# Patient Record
Sex: FEMALE | Race: BLACK OR AFRICAN AMERICAN | Employment: FULL TIME | URBAN - METROPOLITAN AREA
[De-identification: names, ages, dates, MRNs, and addresses within clinical notes are randomized per-mention and may not be internally consistent; named-entity substitution may affect disease eponyms.]

---

## 2017-03-27 ENCOUNTER — OFFICE VISIT (OUTPATIENT)
Dept: INTERNAL MEDICINE | Age: 37
End: 2017-03-27
Payer: MEDICAID

## 2017-03-27 VITALS
HEART RATE: 86 BPM | DIASTOLIC BLOOD PRESSURE: 85 MMHG | WEIGHT: 238 LBS | HEIGHT: 64 IN | BODY MASS INDEX: 40.63 KG/M2 | SYSTOLIC BLOOD PRESSURE: 124 MMHG

## 2017-03-27 DIAGNOSIS — J45.20 MILD INTERMITTENT ASTHMA WITHOUT COMPLICATION: ICD-10-CM

## 2017-03-27 DIAGNOSIS — E66.01 MORBID OBESITY DUE TO EXCESS CALORIES (HCC): ICD-10-CM

## 2017-03-27 DIAGNOSIS — R73.09 IMPAIRED GLUCOSE REGULATION: ICD-10-CM

## 2017-03-27 DIAGNOSIS — R21 RASH: ICD-10-CM

## 2017-03-27 LAB — HBA1C MFR BLD: 5.8 %

## 2017-03-27 PROCEDURE — 99203 OFFICE O/P NEW LOW 30 MIN: CPT | Performed by: INTERNAL MEDICINE

## 2017-03-27 PROCEDURE — 83036 HEMOGLOBIN GLYCOSYLATED A1C: CPT | Performed by: INTERNAL MEDICINE

## 2017-03-27 RX ORDER — AZITHROMYCIN 250 MG/1
TABLET, FILM COATED ORAL
COMMUNITY
Start: 2016-12-08 | End: 2017-03-27 | Stop reason: ALTCHOICE

## 2017-03-27 RX ORDER — PNV,CALCIUM 72/IRON/FOLIC ACID 27 MG-1 MG
TABLET ORAL
Refills: 12 | COMMUNITY
Start: 2017-02-28 | End: 2017-05-19 | Stop reason: SDUPTHER

## 2017-03-27 RX ORDER — FLUCONAZOLE 150 MG/1
TABLET ORAL
Refills: 2 | COMMUNITY
Start: 2017-02-28 | End: 2017-07-11 | Stop reason: ALTCHOICE

## 2017-03-27 RX ORDER — BENZONATATE 100 MG/1
100 CAPSULE ORAL
COMMUNITY
Start: 2016-12-08 | End: 2017-03-27 | Stop reason: ALTCHOICE

## 2017-03-27 RX ORDER — PREDNISONE 20 MG/1
20 TABLET ORAL
COMMUNITY
Start: 2016-12-08 | End: 2017-03-27 | Stop reason: ALTCHOICE

## 2017-03-27 RX ORDER — ALBUTEROL SULFATE 90 UG/1
2 AEROSOL, METERED RESPIRATORY (INHALATION) EVERY 6 HOURS PRN
Qty: 1 INHALER | Refills: 3 | Status: SHIPPED | OUTPATIENT
Start: 2017-03-27

## 2017-03-27 RX ORDER — HYDROCORTISONE VALERATE 2 MG/G
OINTMENT TOPICAL
Qty: 2 TUBE | Refills: 0 | Status: SHIPPED | OUTPATIENT
Start: 2017-03-27 | End: 2017-05-19

## 2017-03-27 RX ORDER — HYDROXYZINE HYDROCHLORIDE 25 MG/1
25 TABLET, FILM COATED ORAL 3 TIMES DAILY PRN
Qty: 30 TABLET | Refills: 2 | Status: SHIPPED | OUTPATIENT
Start: 2017-03-27 | End: 2017-04-06

## 2017-04-06 ENCOUNTER — HOSPITAL ENCOUNTER (EMERGENCY)
Age: 37
Discharge: HOME | End: 2017-04-06
Attending: EMERGENCY MEDICINE
Payer: COMMERCIAL

## 2017-04-06 ENCOUNTER — APPOINTMENT (OUTPATIENT)
Dept: GENERAL RADIOLOGY | Age: 37
End: 2017-04-06
Payer: COMMERCIAL

## 2017-04-06 VITALS
TEMPERATURE: 98 F | WEIGHT: 240.7 LBS | DIASTOLIC BLOOD PRESSURE: 60 MMHG | OXYGEN SATURATION: 99 % | HEART RATE: 81 BPM | BODY MASS INDEX: 41.32 KG/M2 | SYSTOLIC BLOOD PRESSURE: 124 MMHG | RESPIRATION RATE: 16 BRPM

## 2017-04-06 DIAGNOSIS — S43.401A SPRAIN OF RIGHT SHOULDER, UNSPECIFIED SHOULDER SPRAIN TYPE, INITIAL ENCOUNTER: Primary | ICD-10-CM

## 2017-04-06 PROCEDURE — 99283 EMERGENCY DEPT VISIT LOW MDM: CPT

## 2017-04-06 PROCEDURE — 6360000002 HC RX W HCPCS: Performed by: NURSE PRACTITIONER

## 2017-04-06 PROCEDURE — 73030 X-RAY EXAM OF SHOULDER: CPT

## 2017-04-06 PROCEDURE — 96372 THER/PROPH/DIAG INJ SC/IM: CPT

## 2017-04-06 RX ORDER — IBUPROFEN 800 MG/1
800 TABLET ORAL EVERY 8 HOURS PRN
Qty: 30 TABLET | Refills: 0 | Status: SHIPPED | OUTPATIENT
Start: 2017-04-06 | End: 2017-07-11 | Stop reason: ALTCHOICE

## 2017-04-06 RX ORDER — ACETAMINOPHEN AND CODEINE PHOSPHATE 300; 30 MG/1; MG/1
1 TABLET ORAL 3 TIMES DAILY PRN
Qty: 10 TABLET | Refills: 0 | Status: SHIPPED | OUTPATIENT
Start: 2017-04-06 | End: 2017-07-11 | Stop reason: ALTCHOICE

## 2017-04-06 RX ORDER — KETOROLAC TROMETHAMINE 30 MG/ML
60 INJECTION, SOLUTION INTRAMUSCULAR; INTRAVENOUS ONCE
Status: COMPLETED | OUTPATIENT
Start: 2017-04-06 | End: 2017-04-06

## 2017-04-06 RX ADMIN — KETOROLAC TROMETHAMINE 60 MG: 30 INJECTION, SOLUTION INTRAMUSCULAR at 10:59

## 2017-04-06 ASSESSMENT — ENCOUNTER SYMPTOMS
NAUSEA: 0
COUGH: 0
SHORTNESS OF BREATH: 0
SINUS PRESSURE: 0
WHEEZING: 0
CONSTIPATION: 0
ABDOMINAL PAIN: 0
SORE THROAT: 0
VOMITING: 0
COLOR CHANGE: 0
RHINORRHEA: 0
DIARRHEA: 0

## 2017-04-06 ASSESSMENT — PAIN SCALES - GENERAL
PAINLEVEL_OUTOF10: 10
PAINLEVEL_OUTOF10: 10

## 2017-04-17 ENCOUNTER — HOSPITAL ENCOUNTER (OUTPATIENT)
Dept: MRI IMAGING | Facility: CLINIC | Age: 37
Discharge: HOME | End: 2017-04-17
Payer: COMMERCIAL

## 2017-04-17 DIAGNOSIS — S43.401A SPRAIN OF RIGHT SHOULDER, UNSPECIFIED SHOULDER SPRAIN TYPE, INITIAL ENCOUNTER: ICD-10-CM

## 2017-04-17 PROCEDURE — 73221 MRI JOINT UPR EXTREM W/O DYE: CPT

## 2017-05-19 ENCOUNTER — OFFICE VISIT (OUTPATIENT)
Dept: INTERNAL MEDICINE | Age: 37
End: 2017-05-19
Payer: MEDICAID

## 2017-05-19 VITALS
WEIGHT: 245 LBS | BODY MASS INDEX: 41.83 KG/M2 | DIASTOLIC BLOOD PRESSURE: 84 MMHG | SYSTOLIC BLOOD PRESSURE: 124 MMHG | HEART RATE: 87 BPM | HEIGHT: 64 IN

## 2017-05-19 DIAGNOSIS — R73.03 PREDIABETES: ICD-10-CM

## 2017-05-19 DIAGNOSIS — R73.09 IMPAIRED GLUCOSE REGULATION: ICD-10-CM

## 2017-05-19 DIAGNOSIS — E66.01 MORBID OBESITY DUE TO EXCESS CALORIES (HCC): ICD-10-CM

## 2017-05-19 DIAGNOSIS — R21 RASH: Primary | ICD-10-CM

## 2017-05-19 PROCEDURE — 99213 OFFICE O/P EST LOW 20 MIN: CPT | Performed by: STUDENT IN AN ORGANIZED HEALTH CARE EDUCATION/TRAINING PROGRAM

## 2017-05-19 RX ORDER — DIAPER,BRIEF,INFANT-TODD,DISP
EACH MISCELLANEOUS
Qty: 1 TUBE | Refills: 1 | Status: CANCELLED | OUTPATIENT
Start: 2017-05-19 | End: 2017-05-26

## 2017-05-19 RX ORDER — PERMETHRIN 50 MG/G
CREAM TOPICAL
Refills: 1 | COMMUNITY
Start: 2017-05-16 | End: 2017-07-11 | Stop reason: CLARIF

## 2017-05-19 RX ORDER — METHYLPREDNISOLONE 4 MG/1
TABLET ORAL
Qty: 1 KIT | Refills: 0 | Status: SHIPPED | OUTPATIENT
Start: 2017-05-19 | End: 2017-05-25

## 2017-05-19 ASSESSMENT — PATIENT HEALTH QUESTIONNAIRE - PHQ9
3. TROUBLE FALLING OR STAYING ASLEEP: 1
6. FEELING BAD ABOUT YOURSELF - OR THAT YOU ARE A FAILURE OR HAVE LET YOURSELF OR YOUR FAMILY DOWN: 0
5. POOR APPETITE OR OVEREATING: 1
8. MOVING OR SPEAKING SO SLOWLY THAT OTHER PEOPLE COULD HAVE NOTICED. OR THE OPPOSITE, BEING SO FIGETY OR RESTLESS THAT YOU HAVE BEEN MOVING AROUND A LOT MORE THAN USUAL: 0
7. TROUBLE CONCENTRATING ON THINGS, SUCH AS READING THE NEWSPAPER OR WATCHING TELEVISION: 0
1. LITTLE INTEREST OR PLEASURE IN DOING THINGS: 1
2. FEELING DOWN, DEPRESSED OR HOPELESS: 0
9. THOUGHTS THAT YOU WOULD BE BETTER OFF DEAD, OR OF HURTING YOURSELF: 0
10. IF YOU CHECKED OFF ANY PROBLEMS, HOW DIFFICULT HAVE THESE PROBLEMS MADE IT FOR YOU TO DO YOUR WORK, TAKE CARE OF THINGS AT HOME, OR GET ALONG WITH OTHER PEOPLE: 0
SUM OF ALL RESPONSES TO PHQ QUESTIONS 1-9: 4
SUM OF ALL RESPONSES TO PHQ9 QUESTIONS 1 & 2: 1
4. FEELING TIRED OR HAVING LITTLE ENERGY: 1

## 2017-07-01 ENCOUNTER — HOSPITAL ENCOUNTER (OUTPATIENT)
Age: 37
Discharge: HOME | End: 2017-07-01
Payer: MEDICAID

## 2017-07-01 LAB
ALBUMIN SERPL-MCNC: 3.8 G/DL (ref 3.5–5.2)
ALBUMIN/GLOBULIN RATIO: 1.2 (ref 1–2.5)
ALP BLD-CCNC: 62 U/L (ref 35–104)
ALT SERPL-CCNC: 11 U/L (ref 5–33)
ANION GAP SERPL CALCULATED.3IONS-SCNC: 14 MMOL/L (ref 9–17)
AST SERPL-CCNC: 16 U/L
BILIRUB SERPL-MCNC: 0.17 MG/DL (ref 0.3–1.2)
BUN BLDV-MCNC: 10 MG/DL (ref 6–20)
BUN/CREAT BLD: ABNORMAL (ref 9–20)
CALCIUM SERPL-MCNC: 8.7 MG/DL (ref 8.6–10.4)
CHLORIDE BLD-SCNC: 103 MMOL/L (ref 98–107)
CHOLESTEROL/HDL RATIO: 4.1
CHOLESTEROL: 189 MG/DL
CO2: 23 MMOL/L (ref 20–31)
CREAT SERPL-MCNC: 0.72 MG/DL (ref 0.5–0.9)
GFR AFRICAN AMERICAN: >60 ML/MIN
GFR NON-AFRICAN AMERICAN: >60 ML/MIN
GFR SERPL CREATININE-BSD FRML MDRD: ABNORMAL ML/MIN/{1.73_M2}
GFR SERPL CREATININE-BSD FRML MDRD: ABNORMAL ML/MIN/{1.73_M2}
GLUCOSE BLD-MCNC: 95 MG/DL (ref 70–99)
HDLC SERPL-MCNC: 46 MG/DL
LDL CHOLESTEROL: 123 MG/DL (ref 0–130)
POTASSIUM SERPL-SCNC: 3.8 MMOL/L (ref 3.7–5.3)
SODIUM BLD-SCNC: 140 MMOL/L (ref 135–144)
TOTAL PROTEIN: 7.1 G/DL (ref 6.4–8.3)
TRIGL SERPL-MCNC: 102 MG/DL
TSH SERPL DL<=0.05 MIU/L-ACNC: 1.48 MIU/L (ref 0.3–5)
VLDLC SERPL CALC-MCNC: NORMAL MG/DL (ref 1–30)

## 2017-07-01 PROCEDURE — 84443 ASSAY THYROID STIM HORMONE: CPT

## 2017-07-01 PROCEDURE — 36415 COLL VENOUS BLD VENIPUNCTURE: CPT

## 2017-07-01 PROCEDURE — 80053 COMPREHEN METABOLIC PANEL: CPT

## 2017-07-01 PROCEDURE — 80061 LIPID PANEL: CPT

## 2017-07-11 ENCOUNTER — OFFICE VISIT (OUTPATIENT)
Dept: INTERNAL MEDICINE | Age: 37
End: 2017-07-11
Payer: MEDICAID

## 2017-07-11 VITALS
WEIGHT: 243 LBS | HEIGHT: 64 IN | DIASTOLIC BLOOD PRESSURE: 79 MMHG | BODY MASS INDEX: 41.48 KG/M2 | TEMPERATURE: 99 F | SYSTOLIC BLOOD PRESSURE: 120 MMHG | HEART RATE: 82 BPM

## 2017-07-11 DIAGNOSIS — G89.29 CHRONIC MIDLINE LOW BACK PAIN WITHOUT SCIATICA: ICD-10-CM

## 2017-07-11 DIAGNOSIS — R73.03 PRE-DIABETES: ICD-10-CM

## 2017-07-11 DIAGNOSIS — L30.9 ECZEMA OF BOTH UPPER EXTREMITIES: ICD-10-CM

## 2017-07-11 DIAGNOSIS — Z02.1 PRE-EMPLOYMENT EXAMINATION: ICD-10-CM

## 2017-07-11 DIAGNOSIS — E66.01 MORBID OBESITY DUE TO EXCESS CALORIES (HCC): Primary | ICD-10-CM

## 2017-07-11 DIAGNOSIS — M54.50 CHRONIC MIDLINE LOW BACK PAIN WITHOUT SCIATICA: ICD-10-CM

## 2017-07-11 PROCEDURE — 99213 OFFICE O/P EST LOW 20 MIN: CPT | Performed by: HOSPITALIST

## 2017-07-11 RX ORDER — NAPROXEN 500 MG/1
500 TABLET ORAL 2 TIMES DAILY PRN
Qty: 40 TABLET | Refills: 3 | Status: SHIPPED | OUTPATIENT
Start: 2017-07-11 | End: 2019-07-17 | Stop reason: ALTCHOICE

## 2017-07-11 RX ORDER — FENOPROFEN CALCIUM 200 MG
CAPSULE ORAL
Qty: 1 BOTTLE | Refills: 1 | Status: SHIPPED | OUTPATIENT
Start: 2017-07-11 | End: 2020-02-25

## 2017-08-09 ENCOUNTER — HOSPITAL ENCOUNTER (OUTPATIENT)
Age: 37
Setting detail: SPECIMEN
Discharge: HOME | End: 2017-08-09
Payer: MEDICAID

## 2017-08-09 ENCOUNTER — OFFICE VISIT (OUTPATIENT)
Dept: OBGYN | Age: 37
End: 2017-08-09
Payer: MEDICAID

## 2017-08-09 VITALS
HEART RATE: 85 BPM | SYSTOLIC BLOOD PRESSURE: 123 MMHG | WEIGHT: 241.5 LBS | BODY MASS INDEX: 41.23 KG/M2 | HEIGHT: 64 IN | DIASTOLIC BLOOD PRESSURE: 78 MMHG

## 2017-08-09 DIAGNOSIS — N93.9 ABNORMAL UTERINE BLEEDING (AUB): Primary | ICD-10-CM

## 2017-08-09 DIAGNOSIS — N39.41 URGE INCONTINENCE OF URINE: ICD-10-CM

## 2017-08-09 PROCEDURE — 99213 OFFICE O/P EST LOW 20 MIN: CPT | Performed by: OBSTETRICS & GYNECOLOGY

## 2017-08-10 LAB
CULTURE: NORMAL
CULTURE: NORMAL
Lab: NORMAL
SPECIMEN DESCRIPTION: NORMAL
STATUS: NORMAL

## 2017-09-01 ENCOUNTER — HOSPITAL ENCOUNTER (OUTPATIENT)
Age: 37
Setting detail: SPECIMEN
Discharge: HOME | End: 2017-09-01
Payer: MEDICAID

## 2017-09-01 ENCOUNTER — PROCEDURE VISIT (OUTPATIENT)
Dept: OBGYN | Age: 37
End: 2017-09-01
Payer: MEDICAID

## 2017-09-01 VITALS
BODY MASS INDEX: 41.2 KG/M2 | HEART RATE: 89 BPM | DIASTOLIC BLOOD PRESSURE: 88 MMHG | WEIGHT: 240 LBS | SYSTOLIC BLOOD PRESSURE: 134 MMHG

## 2017-09-01 DIAGNOSIS — N92.0 MENORRHAGIA WITH REGULAR CYCLE: ICD-10-CM

## 2017-09-01 DIAGNOSIS — E88.81 METABOLIC SYNDROME: ICD-10-CM

## 2017-09-01 DIAGNOSIS — N93.9 ABNORMAL UTERINE BLEEDING (AUB): ICD-10-CM

## 2017-09-01 DIAGNOSIS — N94.6 DYSMENORRHEA: ICD-10-CM

## 2017-09-01 DIAGNOSIS — N93.9 ABNORMAL UTERINE BLEEDING (AUB): Primary | ICD-10-CM

## 2017-09-01 PROBLEM — E88.810 METABOLIC SYNDROME: Status: ACTIVE | Noted: 2017-09-01

## 2017-09-01 LAB
ABSOLUTE EOS #: 0.1 K/UL (ref 0–0.4)
ABSOLUTE LYMPH #: 2.7 K/UL (ref 1–4.8)
ABSOLUTE MONO #: 0.6 K/UL (ref 0.1–1.2)
BASOPHILS # BLD: 1 %
BASOPHILS ABSOLUTE: 0 K/UL (ref 0–0.2)
CONTROL: NORMAL
DIFFERENTIAL TYPE: ABNORMAL
EOSINOPHILS RELATIVE PERCENT: 1 %
HCG QUANTITATIVE: <1 IU/L
HCT VFR BLD CALC: 30.2 % (ref 36–46)
HEMOGLOBIN: 10 G/DL (ref 12–16)
INR BLD: 0.9
LYMPHOCYTES # BLD: 33 %
MCH RBC QN AUTO: 29 PG (ref 26–34)
MCHC RBC AUTO-ENTMCNC: 33 G/DL (ref 31–37)
MCV RBC AUTO: 87.9 FL (ref 80–100)
MONOCYTES # BLD: 8 %
PARTIAL THROMBOPLASTIN TIME: 23.7 SEC (ref 21.3–31.3)
PDW BLD-RTO: 15.8 % (ref 12.5–15.4)
PLATELET # BLD: 417 K/UL (ref 140–450)
PLATELET ESTIMATE: ABNORMAL
PMV BLD AUTO: 7.3 FL (ref 6–12)
PREGNANCY TEST URINE, POC: NEGATIVE
PROTHROMBIN TIME: 10.1 SEC (ref 9.4–12.6)
RBC # BLD: 3.43 M/UL (ref 4–5.2)
RBC # BLD: ABNORMAL 10*6/UL
SEG NEUTROPHILS: 57 %
SEGMENTED NEUTROPHILS ABSOLUTE COUNT: 4.7 K/UL (ref 1.8–7.7)
WBC # BLD: 8.2 K/UL (ref 3.5–11)
WBC # BLD: ABNORMAL 10*3/UL

## 2017-09-01 PROCEDURE — 99213 OFFICE O/P EST LOW 20 MIN: CPT | Performed by: OBSTETRICS & GYNECOLOGY

## 2017-09-01 PROCEDURE — 85610 PROTHROMBIN TIME: CPT

## 2017-09-01 PROCEDURE — 85730 THROMBOPLASTIN TIME PARTIAL: CPT

## 2017-09-01 PROCEDURE — 85025 COMPLETE CBC W/AUTO DIFF WBC: CPT

## 2017-09-01 PROCEDURE — 81025 URINE PREGNANCY TEST: CPT | Performed by: OBSTETRICS & GYNECOLOGY

## 2017-09-01 PROCEDURE — 84702 CHORIONIC GONADOTROPIN TEST: CPT

## 2017-09-01 PROCEDURE — 36415 COLL VENOUS BLD VENIPUNCTURE: CPT

## 2017-10-16 ENCOUNTER — HOSPITAL ENCOUNTER (OUTPATIENT)
Age: 37
Setting detail: SPECIMEN
Discharge: HOME | End: 2017-10-16
Payer: MEDICAID

## 2017-10-16 ENCOUNTER — PROCEDURE VISIT (OUTPATIENT)
Dept: OBGYN | Age: 37
End: 2017-10-16
Payer: MEDICAID

## 2017-10-16 VITALS
DIASTOLIC BLOOD PRESSURE: 81 MMHG | WEIGHT: 234 LBS | HEART RATE: 81 BPM | HEIGHT: 64 IN | SYSTOLIC BLOOD PRESSURE: 116 MMHG | BODY MASS INDEX: 39.95 KG/M2

## 2017-10-16 DIAGNOSIS — N93.9 ABNORMAL UTERINE BLEEDING (AUB): Primary | ICD-10-CM

## 2017-10-16 DIAGNOSIS — N94.6 DYSMENORRHEA: ICD-10-CM

## 2017-10-16 LAB
CONTROL: NORMAL
PREGNANCY TEST URINE, POC: NEGATIVE

## 2017-10-16 PROCEDURE — 58100 BIOPSY OF UTERUS LINING: CPT | Performed by: OBSTETRICS & GYNECOLOGY

## 2017-10-16 PROCEDURE — 81025 URINE PREGNANCY TEST: CPT | Performed by: OBSTETRICS & GYNECOLOGY

## 2017-10-16 NOTE — PROGRESS NOTES
Date: 10/16/2017  Time: 5:14 PM      Patient Name: Carina Hernández  Patient : 1980  Room/Bed: Room/bed info not found  Admission Date/Time: No admission date for patient encounter. Attending Physician Statement  I have discussed the care of Carina Hernández, including pertinent history and exam findings with the resident. I have reviewed and edited their note in the electronic medical record. The key elements of all parts of the encounter have been performed/reviewed by me . I agree with the assessment, plan and orders as documented by the resident. The level of care submitted represents to the best of my ability the care documented in the medical record today. GC Modifier. This service has been performed in part by a resident under the direction of a teaching physician. Patient was seen today for F/U after TVUS. EMB was also  Performed. .    Attending's Name:  Bagn Marcos MD

## 2017-10-16 NOTE — PROGRESS NOTES
10/16/2017    Don Vega is a 40 y.o. female,       No LMP recorded. Chief Complaint   Patient presents with    Procedure     EMB w Hscope       Urine pregnancy test: negative     Past Medical History:   Diagnosis Date    BMI 40.0-44.9, adult ([address] 2016         Past Surgical History:   Procedure Laterality Date     SECTION         Social History   Substance Use Topics    Smoking status: Never Smoker    Smokeless tobacco: Never Used    Alcohol use Yes      Comment: social         Current Outpatient Prescriptions   Medication Sig Dispense Refill    metFORMIN (GLUCOPHAGE) 500 MG tablet Take 1 tablet by mouth 2 times daily (with meals) 60 tablet 5    hydrocortisone 1 % lotion Apply topically 2 times daily. 1 Bottle 1    naproxen (NAPROSYN) 500 MG tablet Take 1 tablet by mouth 2 times daily as needed for Pain (for back pain) 40 tablet 3    albuterol sulfate  (90 BASE) MCG/ACT inhaler Inhale 2 puffs into the lungs every 6 hours as needed for Wheezing 1 Inhaler 3    Prenatal Multivit-Min-Fe-FA (PRENATAL FORTE) TABS Take 1 tablet by mouth Daily 30 tablet 12     No current facility-administered medications for this visit. Allergies as of 10/16/2017    (No Known Allergies)         Diagnostics:  EXAMINATION:   PELVIC ULTRASOUND       2017       TECHNIQUE:   Transvaginal pelvic ultrasound was performed.  No color Doppler evaluation   was performed.       COMPARISON:   2011       HISTORY:   ORDERING SYSTEM PROVIDED HISTORY: Abnormal uterine bleeding (AUB)   TECHNOLOGIST PROVIDED HISTORY:   Begin with transabdominal imaging.    Reason for exam:->abnormal bleeding       FINDINGS:   Measurements:       Uterus:  10.2 x 7.6 x 9.3 cm       Endometrial stripe:  9 mm       Right Ovary:  Was not able to be visualized       Left Ovary:  Was not able to be visualized       Ultrasound Findings:       Uterus: Uterine myometrium is somewhat heterogeneous. Tarry Kellie is a hypoechoic mass in the left low uterine body measuring approximately 4.4 x 3.1 x 4.4 cm   compatible with a fibroid.  A hypoechoic mass in the right posterior uterine   body measures 3.2 x 3.5 x 3.6 cm and is compatible with a fibroid.  Tiny   nabothian cyst in the cervix.       Endometrial stripe: Endometrial stripe is within normal limits.       Ovaries:  Neither ovary could be visualized due to overlying bowel gas.  No   definite adnexal masses are seen within limits of the exam.       Free Fluid: No evidence of free fluid.           Impression   Uterine fibroids.               Blood pressure 116/81, pulse 81, height 5' 4\" (1.626 m), weight 234 lb (106.1 kg). The patient was counseled on the procedure. Risks, benefits and alternatives were reviewed. The patient is aware that this is diagnostic and not curative and a second procedure may be needed. A consent was reviewed and obtained. The patient was positioned comfortably on the exam table. After a bi-manual exam; the uterus was found to be  anteverted with a size of 7 cm. There was no adnexal masses and the bladder was smooth, non-tender and without palpable masses. A sterile speculum was placed into the vagina and the cervix was identified. It was stabilized with an allis clamp. It was cleansed with betadine and the aspirator was then gently passed into the endometrial cavity. Tissue was obtained and sent to pathology. The patient tolerated the procedure well. Post procedure restrictions were reviewed and given to the patient. .  All counts and instruments were correct at the end of the procedure. Assessment:  1. Abnormal uterine bleeding (AUB)  POCT urine pregnancy   2.  Dysmenorrhea  POCT urine pregnancy     Patient Active Problem List    Diagnosis Date Noted    Metabolic syndrome 63/92/1833     Priority: Medium     Metformin 500mg BID      Dysmenorrhea 09/01/2017     Priority: Medium    Abnormal uterine bleeding (AUB) 08/09/2017     Priority: Medium

## 2017-10-19 LAB — SURGICAL PATHOLOGY REPORT: NORMAL

## 2017-11-15 ENCOUNTER — OFFICE VISIT (OUTPATIENT)
Dept: OBGYN | Age: 37
End: 2017-11-15

## 2017-11-15 VITALS
SYSTOLIC BLOOD PRESSURE: 117 MMHG | HEIGHT: 64 IN | HEART RATE: 91 BPM | DIASTOLIC BLOOD PRESSURE: 83 MMHG | BODY MASS INDEX: 40.29 KG/M2 | WEIGHT: 236 LBS

## 2017-11-15 DIAGNOSIS — N94.6 DYSMENORRHEA: ICD-10-CM

## 2017-11-15 DIAGNOSIS — N93.9 ABNORMAL UTERINE BLEEDING (AUB): Primary | ICD-10-CM

## 2017-11-15 PROCEDURE — 99213 OFFICE O/P EST LOW 20 MIN: CPT | Performed by: OBSTETRICS & GYNECOLOGY

## 2017-11-15 PROCEDURE — 99212 OFFICE O/P EST SF 10 MIN: CPT

## 2017-11-15 RX ORDER — LANOLIN ALCOHOL/MO/W.PET/CERES
325 CREAM (GRAM) TOPICAL
Qty: 90 TABLET | Refills: 3 | Status: SHIPPED | OUTPATIENT
Start: 2017-11-15 | End: 2019-07-17

## 2017-11-15 RX ORDER — MEDROXYPROGESTERONE ACETATE 10 MG/1
10 TABLET ORAL DAILY
Qty: 10 TABLET | Refills: 0 | Status: SHIPPED | OUTPATIENT
Start: 2017-11-15 | End: 2019-07-17

## 2017-11-15 NOTE — PROGRESS NOTES
Date: 11/15/2017  Time: 1:44 PM      Patient Name: Gian Galloway  Patient : 1980  Room/Bed: Room/bed info not found  Admission Date/Time: No admission date for patient encounter. Attending Physician Statement  I have discussed the care of Gian Galloway, including pertinent history and exam findings with the resident. I have reviewed and edited their note in the electronic medical record. The key elements of all parts of the encounter have been performed/reviewed by me . I agree with the assessment, plan and orders as documented by the resident. The level of care submitted represents to the best of my ability the care documented in the medical record today. GC Modifier. This service has been performed in part by a resident under the direction of a teaching physician. Patient was seen for F/U after EMB. Histology was benign. TVUS reveals endometrial stripe of .9 mm. Provera P.O. X 10 days prescribed for medical curettage. MIrena IUD insertion to follow after withdrawal bleeding. RTO in 4 weeks.     Attending's Name:  Naheed Adkins MD

## 2017-11-15 NOTE — PATIENT INSTRUCTIONS
Patient Education        Intrauterine Device (IUD) Insertion: Care Instructions  Your Care Instructions    The intrauterine device (IUD) is a very effective method of birth control. It is a small, plastic, T-shaped device that contains copper or hormones. The doctor inserts the IUD into your uterus. A plastic string tied to the end of the IUD hangs down through the cervix into the vagina. There are two types of IUDs. The copper IUD is effective for up to 10 years. The hormonal IUD is effective for either 3 years or 5 years, depending on which IUD is used. The hormonal IUD also reduces menstrual bleeding and cramping. Both types of IUD damage or kill the man's sperm. This means that the woman's egg does not join with the sperm. IUDs also change the lining of the uterus so that the egg does not lodge there. The IUD is most likely to work well for women who have been pregnant before. Some women who have never been pregnant have more trouble keeping the IUD in the uterus. They also may have more pain and cramping after insertion. Follow-up care is a key part of your treatment and safety. Be sure to make and go to all appointments, and call your doctor if you are having problems. It's also a good idea to know your test results and keep a list of the medicines you take. How can you care for yourself at home? · You may experience some mild cramping and light bleeding (spotting) for 1 or 2 days. Use a hot water bottle or a heating pad set on low on your belly for pain. · Take an over-the-counter pain medicine, such as acetaminophen (Tylenol), ibuprofen (Advil, Motrin), and naproxen (Aleve) if needed. Read and follow all instructions on the label. · Do not take two or more pain medicines at the same time unless the doctor told you to. Many pain medicines have acetaminophen, which is Tylenol. Too much acetaminophen (Tylenol) can be harmful. · Check the string of your IUD after every period.  To do this, insert a finger into your vagina and feel for the cervix, which is at the top of the vagina and feels harder than the rest of your vagina. You should be able to feel the thin, plastic string coming out of the opening of your cervix. If you cannot feel the string, use another form of birth control and make an appointment with your doctor to have the string checked. · If the IUD comes out, save it and call your doctor. Be sure to use another form of birth control while the IUD is out. · Use latex condoms to protect against sexually transmitted infections (STIs), such as gonorrhea and chlamydia. An IUD does not protect you from STIs. Having one sex partner (who does not have STIs and does not have sex with anyone else) is a good way to avoid STIs. When should you call for help? Call 911 anytime you think you may need emergency care. For example, call if:  · You passed out (lost consciousness). · You have sudden, severe pain in your belly or pelvis. Call your doctor now or seek immediate medical care if:  · You have new belly or pelvic pain. · You have severe vaginal bleeding. This means that you are soaking through your usual pads or tampons each hour for 2 or more hours. · You are dizzy or lightheaded, or you feel like you may faint. · You have a fever and pelvic pain or vaginal discharge. · You have pelvic pain that is getting worse. Watch closely for changes in your health, and be sure to contact your doctor if:  · You cannot feel the string, or the IUD comes out. · You feel sick to your stomach, or you vomit. · You think you may be pregnant. Where can you learn more? Go to [email or web address] and sign in to your Access [employer] account. Enter N550 in the EachNet box to learn more about \"Intrauterine Device (IUD) Insertion: Care Instructions. \"     If you do not have an account, please click on the \"Sign Up Now\" link.   Current as of: March 16, 2017  Content Version: 11.3  ©

## 2017-11-15 NOTE — PROGRESS NOTES
Forrest Daniels  11/15/2017    YOB: 1980          The patient was seen today. She is here regarding her endometrial biopsy that she had done on 10/16/17 . Her bowels are regular and she is voiding without difficulty. HPI:  Forrest Daniels is a 40 y.o. female  who presents for follow up on endometrial biopsy done secondary to abnormal uterine bleeding. Endometrial biopsy was WNL and without atypia or malignancy. Patient states she continues to have heavy menstrual periods and would like to proceed with an IUD placement. Patient denies any complaints today. She states she is not sexually active and her LMP was at the end of last month, heavy in nature and lasting about 6 days. Obstetric History       T1      L1     SAB0   TAB0   Ectopic0   Molar0   Multiple0   Live Births0       # Outcome Date GA Lbr Yfn/2nd Weight Sex Delivery Anes PTL Lv   1 Term      CS-Unspec             Past Medical History:   Diagnosis Date    [facility]44[facility]       Past Surgical History:   Procedure Laterality Date     SECTION         History reviewed. No pertinent family history. Social History     Social History    Marital status: Single     Spouse name: N/A    Number of children: N/A    Years of education: N/A     Occupational History    Not on file. Social History Main Topics    Smoking status: Never Smoker    Smokeless tobacco: Never Used    Alcohol use Yes      Comment: social    Drug use: No    Sexual activity: Not on file     Other Topics Concern    Not on file     Social History Narrative    No narrative on file         MEDICATIONS:  Current Outpatient Prescriptions   Medication Sig Dispense Refill    metFORMIN (GLUCOPHAGE) 500 MG tablet Take 1 tablet by mouth 2 times daily (with meals) 60 tablet 5    hydrocortisone 1 % lotion Apply topically 2 times daily.  1 Bottle 1    naproxen (NAPROSYN) 500 MG tablet Take 1 tablet by mouth 2 times ULTRASOUND       9/6/2017       TECHNIQUE:   Transvaginal pelvic ultrasound was performed.  No color Doppler evaluation   was performed.       COMPARISON:   02/24/2011       HISTORY:   ORDERING SYSTEM PROVIDED HISTORY: Abnormal uterine bleeding (AUB)   TECHNOLOGIST PROVIDED HISTORY:   Begin with transabdominal imaging. Reason for exam:->abnormal bleeding       FINDINGS:   Measurements:       Uterus:  10.2 x 7.6 x 9.3 cm       Endometrial stripe:  9 mm       Right Ovary:  Was not able to be visualized       Left Ovary:  Was not able to be visualized       Ultrasound Findings:       Uterus: Uterine myometrium is somewhat heterogeneous. Gloria Monticello is a hypoechoic   mass in the left low uterine body measuring approximately 4.4 x 3.1 x 4.4 cm   compatible with a fibroid.  A hypoechoic mass in the right posterior uterine   body measures 3.2 x 3.5 x 3.6 cm and is compatible with a fibroid.  Tiny   nabothian cyst in the cervix.       Endometrial stripe: Endometrial stripe is within normal limits.       Ovaries:  Neither ovary could be visualized due to overlying bowel gas.  No   definite adnexal masses are seen within limits of the exam.       Free Fluid: No evidence of free fluid.           Impression   Uterine fibroids.             Lab Results:  Results for orders placed or performed during the hospital encounter of 10/16/17   Surgical Pathology   Result Value Ref Range    Surgical Pathology Report       (NOTE)  [id number]  [address] [place] [address]. [place], [address]  [phone]  Fax: [phone]    2 [facility]    Patient Name: Matt Olvera [facility] Rec: [id number]  Path Number: [id number]  Collected: 10/16/2017  Received: 10/17/2017  Reported: 10/19/2017 10:40    -- Diagnosis --    ENDOMETRIAL BIOPSY: PROLIFERATIVE ENDOMETRIUM WITHOUT ATYPIA.        LAYNE Davis  **Electronically Signed Out**

## 2018-04-13 ENCOUNTER — APPOINTMENT (OUTPATIENT)
Dept: CT IMAGING | Age: 38
End: 2018-04-13
Payer: COMMERCIAL

## 2018-04-13 ENCOUNTER — HOSPITAL ENCOUNTER (EMERGENCY)
Age: 38
Discharge: HOME | End: 2018-04-13
Attending: EMERGENCY MEDICINE
Payer: COMMERCIAL

## 2018-04-13 VITALS
OXYGEN SATURATION: 99 % | TEMPERATURE: 99.1 F | BODY MASS INDEX: 37.22 KG/M2 | HEART RATE: 84 BPM | DIASTOLIC BLOOD PRESSURE: 89 MMHG | WEIGHT: 218 LBS | SYSTOLIC BLOOD PRESSURE: 153 MMHG | RESPIRATION RATE: 18 BRPM | HEIGHT: 64 IN

## 2018-04-13 DIAGNOSIS — J32.9 SINUSITIS, UNSPECIFIED CHRONICITY, UNSPECIFIED LOCATION: ICD-10-CM

## 2018-04-13 DIAGNOSIS — R03.0 ELEVATED BLOOD PRESSURE READING: ICD-10-CM

## 2018-04-13 DIAGNOSIS — R51.9 NONINTRACTABLE HEADACHE, UNSPECIFIED CHRONICITY PATTERN, UNSPECIFIED HEADACHE TYPE: Primary | ICD-10-CM

## 2018-04-13 PROCEDURE — 6370000000 HC RX 637 (ALT 250 FOR IP): Performed by: PHYSICIAN ASSISTANT

## 2018-04-13 PROCEDURE — 99284 EMERGENCY DEPT VISIT MOD MDM: CPT

## 2018-04-13 PROCEDURE — 70450 CT HEAD/BRAIN W/O DYE: CPT

## 2018-04-13 RX ORDER — AMOXICILLIN 875 MG/1
875 TABLET, COATED ORAL 2 TIMES DAILY
Qty: 20 TABLET | Refills: 0 | Status: SHIPPED | OUTPATIENT
Start: 2018-04-13 | End: 2018-04-23

## 2018-04-13 RX ORDER — ACETAMINOPHEN 500 MG
1000 TABLET ORAL ONCE
Status: COMPLETED | OUTPATIENT
Start: 2018-04-13 | End: 2018-04-13

## 2018-04-13 RX ORDER — BUTALBITAL, ACETAMINOPHEN AND CAFFEINE 50; 325; 40 MG/1; MG/1; MG/1
1 TABLET ORAL EVERY 4 HOURS PRN
Qty: 180 TABLET | Refills: 3 | Status: SHIPPED | OUTPATIENT
Start: 2018-04-13 | End: 2019-07-17

## 2018-04-13 RX ADMIN — ACETAMINOPHEN 1000 MG: 500 TABLET ORAL at 11:07

## 2018-04-13 ASSESSMENT — PAIN DESCRIPTION - LOCATION: LOCATION: FACE;HEAD

## 2018-04-13 ASSESSMENT — PAIN SCALES - GENERAL
PAINLEVEL_OUTOF10: 10
PAINLEVEL_OUTOF10: 9

## 2018-04-13 ASSESSMENT — PAIN DESCRIPTION - PAIN TYPE: TYPE: ACUTE PAIN

## 2018-04-13 ASSESSMENT — PAIN DESCRIPTION - DESCRIPTORS: DESCRIPTORS: PRESSURE;POUNDING

## 2018-05-09 ENCOUNTER — APPOINTMENT (OUTPATIENT)
Dept: CT IMAGING | Age: 38
End: 2018-05-09
Payer: COMMERCIAL

## 2018-05-09 ENCOUNTER — OFFICE VISIT (OUTPATIENT)
Dept: OBGYN | Age: 38
End: 2018-05-09
Payer: COMMERCIAL

## 2018-05-09 ENCOUNTER — HOSPITAL ENCOUNTER (OUTPATIENT)
Age: 38
Setting detail: SPECIMEN
Discharge: HOME | End: 2018-05-09
Payer: COMMERCIAL

## 2018-05-09 ENCOUNTER — HOSPITAL ENCOUNTER (EMERGENCY)
Age: 38
Discharge: HOME | End: 2018-05-09
Attending: EMERGENCY MEDICINE
Payer: COMMERCIAL

## 2018-05-09 VITALS
TEMPERATURE: 98.4 F | SYSTOLIC BLOOD PRESSURE: 116 MMHG | RESPIRATION RATE: 18 BRPM | HEIGHT: 64 IN | BODY MASS INDEX: 39.03 KG/M2 | HEART RATE: 84 BPM | WEIGHT: 228.6 LBS | DIASTOLIC BLOOD PRESSURE: 78 MMHG

## 2018-05-09 VITALS
SYSTOLIC BLOOD PRESSURE: 133 MMHG | RESPIRATION RATE: 18 BRPM | DIASTOLIC BLOOD PRESSURE: 92 MMHG | TEMPERATURE: 99 F | HEART RATE: 94 BPM | HEIGHT: 64 IN | WEIGHT: 228 LBS | BODY MASS INDEX: 38.93 KG/M2 | OXYGEN SATURATION: 100 %

## 2018-05-09 DIAGNOSIS — E04.1 THYROID NODULE: Primary | ICD-10-CM

## 2018-05-09 DIAGNOSIS — N93.9 ABNORMAL UTERINE BLEEDING: ICD-10-CM

## 2018-05-09 DIAGNOSIS — R10.2 PELVIC PAIN IN FEMALE: ICD-10-CM

## 2018-05-09 DIAGNOSIS — S16.1XXA CERVICAL STRAIN, ACUTE, INITIAL ENCOUNTER: ICD-10-CM

## 2018-05-09 DIAGNOSIS — S00.83XA FACIAL CONTUSION, INITIAL ENCOUNTER: ICD-10-CM

## 2018-05-09 DIAGNOSIS — Z01.419 WELL WOMAN EXAM WITH ROUTINE GYNECOLOGICAL EXAM: Primary | ICD-10-CM

## 2018-05-09 LAB
DIRECT EXAM: ABNORMAL
Lab: ABNORMAL
SPECIMEN DESCRIPTION: ABNORMAL
STATUS: ABNORMAL

## 2018-05-09 PROCEDURE — 99283 EMERGENCY DEPT VISIT LOW MDM: CPT

## 2018-05-09 PROCEDURE — 87480 CANDIDA DNA DIR PROBE: CPT | Performed by: OBSTETRICS & GYNECOLOGY

## 2018-05-09 PROCEDURE — 99395 PREV VISIT EST AGE 18-39: CPT | Performed by: OBSTETRICS & GYNECOLOGY

## 2018-05-09 PROCEDURE — 6360000002 HC RX W HCPCS: Performed by: PHYSICIAN ASSISTANT

## 2018-05-09 PROCEDURE — 6370000000 HC RX 637 (ALT 250 FOR IP): Performed by: PHYSICIAN ASSISTANT

## 2018-05-09 PROCEDURE — 99213 OFFICE O/P EST LOW 20 MIN: CPT | Performed by: OBSTETRICS & GYNECOLOGY

## 2018-05-09 PROCEDURE — 87801 DETECT AGNT MULT DNA AMPLI: CPT | Performed by: OBSTETRICS & GYNECOLOGY

## 2018-05-09 PROCEDURE — 70486 CT MAXILLOFACIAL W/O DYE: CPT

## 2018-05-09 PROCEDURE — 96372 THER/PROPH/DIAG INJ SC/IM: CPT

## 2018-05-09 PROCEDURE — 72125 CT NECK SPINE W/O DYE: CPT

## 2018-05-09 RX ORDER — METHOCARBAMOL 750 MG/1
750 TABLET, FILM COATED ORAL 4 TIMES DAILY
Qty: 40 TABLET | Refills: 0 | Status: SHIPPED | OUTPATIENT
Start: 2018-05-09 | End: 2018-05-19

## 2018-05-09 RX ORDER — IBUPROFEN 800 MG/1
800 TABLET ORAL EVERY 6 HOURS PRN
COMMUNITY
End: 2020-02-25

## 2018-05-09 RX ORDER — KETOROLAC TROMETHAMINE 30 MG/ML
60 INJECTION, SOLUTION INTRAMUSCULAR; INTRAVENOUS ONCE
Status: COMPLETED | OUTPATIENT
Start: 2018-05-09 | End: 2018-05-09

## 2018-05-09 RX ORDER — ACETAMINOPHEN 500 MG
1000 TABLET ORAL ONCE
Status: COMPLETED | OUTPATIENT
Start: 2018-05-09 | End: 2018-05-09

## 2018-05-09 RX ORDER — ACETAMINOPHEN AND CODEINE PHOSPHATE 300; 30 MG/1; MG/1
1-2 TABLET ORAL 3 TIMES DAILY PRN
Qty: 20 TABLET | Refills: 0 | Status: SHIPPED | OUTPATIENT
Start: 2018-05-09 | End: 2018-05-14

## 2018-05-09 RX ADMIN — ACETAMINOPHEN 1000 MG: 500 TABLET, COATED ORAL at 15:55

## 2018-05-09 RX ADMIN — KETOROLAC TROMETHAMINE 60 MG: 60 INJECTION, SOLUTION INTRAMUSCULAR at 15:52

## 2018-05-09 ASSESSMENT — PAIN DESCRIPTION - DESCRIPTORS: DESCRIPTORS: THROBBING;TENDER;ACHING

## 2018-05-09 ASSESSMENT — PAIN SCALES - GENERAL
PAINLEVEL_OUTOF10: 10

## 2018-05-09 ASSESSMENT — PAIN DESCRIPTION - ORIENTATION: ORIENTATION: RIGHT

## 2018-05-09 ASSESSMENT — PAIN DESCRIPTION - LOCATION: LOCATION: EAR;FACE

## 2018-05-10 DIAGNOSIS — A59.9 TRICHOMONAS VAGINALIS INFECTION: Primary | ICD-10-CM

## 2018-05-10 LAB
C TRACH DNA GENITAL QL NAA+PROBE: NEGATIVE
N. GONORRHOEAE DNA: NEGATIVE

## 2018-05-10 RX ORDER — METRONIDAZOLE 500 MG/1
500 TABLET ORAL 2 TIMES DAILY
Qty: 14 TABLET | Refills: 0 | Status: SHIPPED | OUTPATIENT
Start: 2018-05-10 | End: 2020-02-25

## 2019-01-04 ENCOUNTER — HOSPITAL ENCOUNTER (EMERGENCY)
Age: 39
Discharge: HOME | End: 2019-01-04
Attending: EMERGENCY MEDICINE
Payer: COMMERCIAL

## 2019-01-04 VITALS
RESPIRATION RATE: 20 BRPM | HEIGHT: 64 IN | SYSTOLIC BLOOD PRESSURE: 136 MMHG | TEMPERATURE: 98.7 F | HEART RATE: 80 BPM | BODY MASS INDEX: 38.41 KG/M2 | DIASTOLIC BLOOD PRESSURE: 72 MMHG | WEIGHT: 225 LBS | OXYGEN SATURATION: 99 %

## 2019-01-04 DIAGNOSIS — M62.830 SPASM OF MUSCLE OF LOWER BACK: Primary | ICD-10-CM

## 2019-01-04 PROCEDURE — 81003 URINALYSIS AUTO W/O SCOPE: CPT

## 2019-01-04 PROCEDURE — 96372 THER/PROPH/DIAG INJ SC/IM: CPT

## 2019-01-04 PROCEDURE — 84703 CHORIONIC GONADOTROPIN ASSAY: CPT

## 2019-01-04 PROCEDURE — 99283 EMERGENCY DEPT VISIT LOW MDM: CPT

## 2019-01-04 PROCEDURE — 6360000002 HC RX W HCPCS: Performed by: NURSE PRACTITIONER

## 2019-01-04 RX ORDER — KETOROLAC TROMETHAMINE 30 MG/ML
30 INJECTION, SOLUTION INTRAMUSCULAR; INTRAVENOUS ONCE
Status: COMPLETED | OUTPATIENT
Start: 2019-01-04 | End: 2019-01-04

## 2019-01-04 RX ORDER — IBUPROFEN 600 MG/1
600 TABLET ORAL EVERY 6 HOURS PRN
Qty: 40 TABLET | Refills: 0 | Status: ON HOLD | OUTPATIENT
Start: 2019-01-04 | End: 2021-04-15 | Stop reason: HOSPADM

## 2019-01-04 RX ORDER — METAXALONE 800 MG/1
800 TABLET ORAL 3 TIMES DAILY
Qty: 21 TABLET | Refills: 0 | Status: SHIPPED | OUTPATIENT
Start: 2019-01-04 | End: 2019-01-11

## 2019-01-04 RX ORDER — ACETAMINOPHEN AND CODEINE PHOSPHATE 300; 30 MG/1; MG/1
1 TABLET ORAL EVERY 4 HOURS PRN
Qty: 18 TABLET | Refills: 0 | Status: SHIPPED | OUTPATIENT
Start: 2019-01-04 | End: 2019-01-07

## 2019-01-04 RX ADMIN — KETOROLAC TROMETHAMINE 30 MG: 30 INJECTION, SOLUTION INTRAMUSCULAR at 14:36

## 2019-01-04 ASSESSMENT — PAIN DESCRIPTION - LOCATION: LOCATION: BACK

## 2019-01-04 ASSESSMENT — PAIN DESCRIPTION - ORIENTATION: ORIENTATION: LOWER

## 2019-01-04 ASSESSMENT — PAIN SCALES - GENERAL
PAINLEVEL_OUTOF10: 10
PAINLEVEL_OUTOF10: 10

## 2019-01-07 LAB — HCG, PREGNANCY URINE (POC): NEGATIVE

## 2019-07-17 ENCOUNTER — APPOINTMENT (OUTPATIENT)
Dept: GENERAL RADIOLOGY | Age: 39
End: 2019-07-17
Payer: COMMERCIAL

## 2019-07-17 ENCOUNTER — APPOINTMENT (OUTPATIENT)
Dept: CT IMAGING | Age: 39
End: 2019-07-17
Payer: COMMERCIAL

## 2019-07-17 ENCOUNTER — HOSPITAL ENCOUNTER (EMERGENCY)
Age: 39
Discharge: HOME | End: 2019-07-17
Attending: EMERGENCY MEDICINE
Payer: COMMERCIAL

## 2019-07-17 VITALS
TEMPERATURE: 98.3 F | SYSTOLIC BLOOD PRESSURE: 132 MMHG | WEIGHT: 222 LBS | RESPIRATION RATE: 20 BRPM | DIASTOLIC BLOOD PRESSURE: 80 MMHG | HEART RATE: 92 BPM | OXYGEN SATURATION: 100 % | HEIGHT: 64 IN | BODY MASS INDEX: 37.9 KG/M2

## 2019-07-17 DIAGNOSIS — S39.012A BACK STRAIN, INITIAL ENCOUNTER: Primary | ICD-10-CM

## 2019-07-17 DIAGNOSIS — S39.012A LUMBOSACRAL STRAIN, INITIAL ENCOUNTER: ICD-10-CM

## 2019-07-17 DIAGNOSIS — S29.019A THORACIC MYOFASCIAL STRAIN, INITIAL ENCOUNTER: ICD-10-CM

## 2019-07-17 PROCEDURE — 72100 X-RAY EXAM L-S SPINE 2/3 VWS: CPT

## 2019-07-17 PROCEDURE — 6360000002 HC RX W HCPCS: Performed by: EMERGENCY MEDICINE

## 2019-07-17 PROCEDURE — 96372 THER/PROPH/DIAG INJ SC/IM: CPT

## 2019-07-17 PROCEDURE — 72125 CT NECK SPINE W/O DYE: CPT

## 2019-07-17 PROCEDURE — 99283 EMERGENCY DEPT VISIT LOW MDM: CPT

## 2019-07-17 RX ORDER — CYCLOBENZAPRINE HCL 10 MG
10 TABLET ORAL NIGHTLY PRN
Qty: 30 TABLET | Refills: 0 | Status: SHIPPED | OUTPATIENT
Start: 2019-07-17 | End: 2019-07-27

## 2019-07-17 RX ORDER — ORPHENADRINE CITRATE 30 MG/ML
60 INJECTION INTRAMUSCULAR; INTRAVENOUS ONCE
Status: DISCONTINUED | OUTPATIENT
Start: 2019-07-17 | End: 2019-07-18 | Stop reason: HOSPADM

## 2019-07-17 RX ORDER — NAPROXEN 250 MG/1
250 TABLET ORAL 2 TIMES DAILY WITH MEALS
Qty: 60 TABLET | Refills: 0 | Status: SHIPPED | OUTPATIENT
Start: 2019-07-17 | End: 2020-02-25

## 2019-07-17 RX ORDER — KETOROLAC TROMETHAMINE 30 MG/ML
30 INJECTION, SOLUTION INTRAMUSCULAR; INTRAVENOUS ONCE
Status: COMPLETED | OUTPATIENT
Start: 2019-07-17 | End: 2019-07-17

## 2019-07-17 RX ADMIN — KETOROLAC TROMETHAMINE 30 MG: 30 INJECTION, SOLUTION INTRAMUSCULAR at 21:21

## 2019-07-17 ASSESSMENT — PAIN SCALES - GENERAL
PAINLEVEL_OUTOF10: 9
PAINLEVEL_OUTOF10: 9

## 2019-07-18 NOTE — ED PROVIDER NOTES
mouth 2 times daily (with meals), Disp-60 tablet, R-0Print           Bulmaro Martino MD  Attending Emergency Physician                    Dara Blackwood MD  07/18/19 3149

## 2019-08-15 ENCOUNTER — HOSPITAL ENCOUNTER (OUTPATIENT)
Dept: PHYSICAL THERAPY | Facility: CLINIC | Age: 39
Setting detail: THERAPIES SERIES
Discharge: HOME | End: 2019-08-15
Payer: COMMERCIAL

## 2019-08-15 PROCEDURE — 97162 PT EVAL MOD COMPLEX 30 MIN: CPT

## 2019-08-15 PROCEDURE — 97124 MASSAGE THERAPY: CPT

## 2019-08-15 PROCEDURE — 97110 THERAPEUTIC EXERCISES: CPT

## 2019-08-15 NOTE — CONSULTS
Change Not Tested   RFIS [] [] [x] []   SAM [] [] [x] []   RFIL [] [] [] []   REIL [] [] [] []   Rep Prot [] [] [] []   Rep Retract [] [] [] []       OBSERVATION No Deficit Deficit Not Tested Comments   Posture       Forward Head [] [x] []    Rounded Shoulders [] [x] []    Kyphosis [] [x] []    Lordosis [] [x] [] Flattened lumbar lordosis, tends to hinge more with thoracic spine   Lateral Shift [x] [] []    Scoliosis [] [] [x]    Iliac Crest [x] [] []    PSIS [x] [] []    ASIS [x] [] []    Genu Valgus [] [x] []    Genu Varus [x] [] []    Genu Recurvatum [] [x] []    Pronation [] [x] []    Supination [x] [] []    Leg Length Discrp [] [] [x]    Slumped Sitting [] [x] []    Palpation [] [x] [] Tender to palpation throughout left upper trapezius and posterior shoulder   Sensation [x] [] []    Edema [] [x] []    Neurological [] [] [x]          FUNCTION Normal Difficult Unable   Sitting [x] [] []   Standing [x] [] []   Ambulation [x] [] []   Groom/Dress [x] [] []   Lift/Carry [] [] [x]   Stairs [x] [] []   Bending [] [x] []   OH reach [] [x] []   Sit to Stand [x] [] []     Comments: Oswestry Low Back: 22/50, 44% impairment; Neck Disability Index (NDI): 25/50, 50% impairment    Assessment: Pt presents with physical therapy signs and symptoms consistent with cervical and lumbar strain. Pt with increased symptoms in her left-side of her neck with increased upper trapezius tightness and occasional radiation of symptoms down her left upper extremity. Pt challenged by looking over shoulder while driving, reaching, lifting, and prolonged positioning. Pt would benefit from skilled physical therapy intervention to increase strength, ROM, functional activity tolerance and to decrease pain. Problems:    [x] ? Back Pain: 7/10    [x] ? Cervical Pain: left-sided, 7/10   [x] ? ROM: decreased lumbar and cervical    [x] ? Strength:   [x] ? Function: Oswestry Low Back: 22/50, 44% impairment;  Neck Disability Index (NDI): 25/50, 50% Phosphate  mAmin  [id number]   [] Therapeutic Activity  08357 [x] Vasopneumatic cold with compression  99811    [] Gait Training   36369 [] Ultrasound   [id number]   [x] Neuromuscular Re-education  31127 [x] Electrical Stimulation Unattended  39164   [x] Manual Therapy  64793 [] Electrical Stimulation Attended  97281   [x] Instruction in HEP  [] Dry Needling   [] Aquatic Therapy   76797 [x] Cold/hotpack    [x] Massage   85754      [] Lumbar/Cervical Traction  41465     []  Medication allergies reviewed for use of    Dexamethasone Sodium Phosphate 4mg/ml     with iontophoresis treatments. Pt is not allergic.     Frequency:  2-3 x/week for 9 visits    Todays Treatment:  Modalities: Hot Pack following treatment session for pain control  Precautions:  Exercises:   Exercise Reps/ Time Weight/ Level Comments   Supine cervical retraction 10x5\"  Given as part of HEP   Upper trap stretch (L) 2x30\"  Given as part of HEP   Supine HS stretch 2x30\" ea  Given as part of HEP   SKTC 10x5\" ea  Given as part of HEP         Massage 10 min  Massage to left upper trapezius and posterior shoulder   Other:    Specific Instructions for next treatment: continue massage, core strengthening, cervical ROM, lumbar ROM, progress to proper lifting technique as appropriate      Evaluation Complexity:  History (Personal factors, comorbidities) [] 0 [x] 1-2 [] 3+   Exam (limitations, restrictions) [] 1-2 [x] 3 [] 4+   Clinical presentation (progression) [] Stable [x] Evolving  [] Unstable   Decision Making [] Low [x] Moderate [] High    [] Low Complexity [x] Moderate Complexity [] High Complexity       Treatment Charges: Mins Units   [x] Evaluation       []  Low       [x]  Moderate       []  High 40 1   [x]  Modalities: HP 10 --   [x]  Ther Exercise 10 1   []  Manual Therapy     []  Ther Activities     []  Aquatics     []  Vasocompression     [x]  Other: massage 10 1     TOTAL TREATMENT TIME: 70    Time in: 0830      Time out: 1000    Electronically

## 2019-08-20 ENCOUNTER — HOSPITAL ENCOUNTER (OUTPATIENT)
Dept: PHYSICAL THERAPY | Facility: CLINIC | Age: 39
Setting detail: THERAPIES SERIES
Discharge: HOME | End: 2019-08-20
Payer: COMMERCIAL

## 2019-08-20 PROCEDURE — 97124 MASSAGE THERAPY: CPT

## 2019-08-20 PROCEDURE — 97110 THERAPEUTIC EXERCISES: CPT

## 2019-08-20 NOTE — FLOWSHEET NOTE
[] [address]  [address]  [phone]  F: [phone] [x] [address]  [facility]   [address]  P: [phone]  F: [phone] [] [address]  [address] [facility]  P: [phone]  F: [phone] [] [address]  [facility]   [address]  [place]: [phone]  F: [phone]     Physical Therapy Daily Treatment Note    Date:  2019  Patient Name:  Shantell Race    :  1980  MRN: [id number]  Physician: Dr. Kd Bailey MD                           Insurance: [facility] 9 Vs -19  Medical Diagnosis: S16. 1XXD - Strain of muscle, fascia and tendon of neck level, subsequent encounter; S29.012D - Strain of muscle and tendon of back wall of thorax, subsequent encounter; S39.012D - Strain of muscle, fascia and tendon of low back, subsequent encounter                          Rehab Codes: M54.2, M54.4, M54.5, M25.512, M62.81  Onset Date: 19               Next 's appt.: 19  Visit# / total visits: 2/9  Cancels/No Shows: 0/0    Subjective:    Pain:  [x] Yes  [] No Location: neck, back  Pain Rating: (0-10 scale) 6/10  Pain altered Tx:  [x] No  [] Yes  Action:    Comments: pt reports the neck is the same, always the same. Voices compliance with her HEP.      Objective:  Modalities: Hot Pack to neck and to back following treatment session for pain control  Precautions:  Exercises:  Exercise Reps/Time Weight/Level Comments   Scifit/Nustep 5min L1          SUPINE      SKTC 10x5\"ea     LTR 10x5\"     HS stretch 2x30\"ea           LUMBAR STAB   With abdominal bracing    Pelvic tilts 10x5\"  Verbal and tactile cues    March  10x  Difficulty maintaining tilt    Alt UE/LE opposite       SLR       Bridge  10  Minimal range          Cervical retraction  10x5\" SIDE LYING      Hip abduction 10xea     Clamshells  10xea     Reverse clamshells  10xea           PRONE      Alt hip ext       Alt UE      Alt UE/LE opposite       Prop on elbows       Press ups                  SEATED      Upper trap stretch  3x15\"ea     Cervical retraction  10x5\"     Cervical AROM 5x5\"ea  Rotation, flex/ext         Other: massage to L upper trap/neck and posterior shoulder, prone ~ 10min     Specific Instructions for next treatment: continue massage, core strengthening, cervical ROM, lumbar ROM, progress to proper lifting technique as appropriate    Treatment Charges: Mins Units   []  Modalities     [x]  Ther Exercise 45 3   []  Manual Therapy     []  Ther Activities     []  Aquatics     []  Vasocompression     [x]  Other massage  10 1   Total Treatment time 55 4       Assessment: [x] Progressing toward goals. Able to complete exercises charted. Pt completing vary slow and slow to transition between exercises. Tactile and verbal cuing required to perform pelvic tilts correctly. Pt voices fatigue after each exercises. [] No change. [] Other:    Problems at evaluation on 8/15   [x] ? Back Pain: 7/10                     [x] ? Cervical Pain: left-sided, 7/10           [x] ? ROM: decreased lumbar and cervical                      [x] ? Strength:        [x] ? Function: Oswestry Low Back: 22/50, 44% impairment; Neck Disability Index (NDI): 25/50, 50% impairment  [x] Postural Deviations  [] Gait Deviations  [] Other:                            STG: (to be met in 9 treatments)  1. ? Pain:  a. Pt will report decreased neck pain following driving, reaching, and lifting to 2-3/10.  b. Pt will report decreased low back pain following prolonged positioning and lifting to 1-2/10.  2. ? ROM:   a. Pt will increase cervical AROM by 10 degrees in flexion and extension and left SB and rotation in order to increase tolerance to driving and sitting for work.   b. Pt will increase lumbar extension AROM by 10

## 2019-08-22 ENCOUNTER — HOSPITAL ENCOUNTER (OUTPATIENT)
Dept: PHYSICAL THERAPY | Facility: CLINIC | Age: 39
Setting detail: THERAPIES SERIES
Discharge: HOME | End: 2019-08-22
Payer: COMMERCIAL

## 2019-08-22 PROCEDURE — 97110 THERAPEUTIC EXERCISES: CPT

## 2019-08-22 PROCEDURE — 97124 MASSAGE THERAPY: CPT

## 2019-08-22 NOTE — FLOWSHEET NOTE
Minimal range          Cervical retraction    Hurts pt head         SIDE LYING      Hip abduction 10xea     Clamshells  10xea     Reverse clamshells  10xea           PRONE      Alt hip ext       Alt UE      Alt UE/LE opposite       Prop on elbows       Press ups                  SEATED      Upper trap stretch  3x15\"ea     Cervical retraction  10x5\"     Cervical AROM 5x5\"ea  Rotation, flex/ext         Other: massage to L upper trap/neck and posterior shoulder, prone ~ 10min     Specific Instructions for next treatment: continue massage, core strengthening, cervical ROM, lumbar ROM, progress to proper lifting technique as appropriate    Treatment Charges: Mins Units   []  Modalities     [x]  Ther Exercise 30 2   []  Manual Therapy     []  Ther Activities     []  Aquatics     []  Vasocompression     [x]  Other massage  10 1   Total Treatment time 40 3       Assessment: [x] Progressing toward goals. Able to complete exercises with a faster pace today. No increased pain noted with exercises. Tenderness has lessened in the neck with massage. [] No change. [] Other:    Problems at evaluation on 8/15   [x] ? Back Pain: 7/10                     [x] ? Cervical Pain: left-sided, 7/10           [x] ? ROM: decreased lumbar and cervical                      [x] ? Strength:        [x] ? Function: Oswestry Low Back: 22/50, 44% impairment; Neck Disability Index (NDI): 25/50, 50% impairment  [x] Postural Deviations  [] Gait Deviations  [] Other:                            STG: (to be met in 9 treatments)  1. ? Pain:  a. Pt will report decreased neck pain following driving, reaching, and lifting to 2-3/10.  b. Pt will report decreased low back pain following prolonged positioning and lifting to 1-2/10.  2. ? ROM:   a. Pt will increase cervical AROM by 10 degrees in flexion and extension and left SB and rotation in order to increase tolerance to driving and sitting for work.   b. Pt will increase lumbar extension AROM by 10

## 2019-08-27 ENCOUNTER — HOSPITAL ENCOUNTER (OUTPATIENT)
Dept: PHYSICAL THERAPY | Facility: CLINIC | Age: 39
Setting detail: THERAPIES SERIES
Discharge: HOME | End: 2019-08-27
Payer: COMMERCIAL

## 2019-08-27 PROCEDURE — 97140 MANUAL THERAPY 1/> REGIONS: CPT

## 2019-08-27 PROCEDURE — 97110 THERAPEUTIC EXERCISES: CPT

## 2019-08-27 NOTE — FLOWSHEET NOTE
[] [address]  [address]  [phone]  F: [phone] [x] [address]  [facility]   [address]  P: [phone]  F: [phone] [] [place]  [address] [facility]  P: [phone]  F: [phone] [] [address]  [facility]   [address]  [place]: [phone]  F: [phone]     Physical Therapy Daily Treatment Note    Date:  2019  Patient Name:  Yoly Banda    :  1980  MRN: [id number]  Physician: Dr. Ahsan Xie MD                           Insurance: [facility] 9 Vs -19  Medical Diagnosis: S16. 1XXD - Strain of muscle, fascia and tendon of neck level, subsequent encounter; S29.012D - Strain of muscle and tendon of back wall of thorax, subsequent encounter; S39.012D - Strain of muscle, fascia and tendon of low back, subsequent encounter                          Rehab Codes: M54.2, M54.4, M54.5, M25.512, M62.81  Onset Date: 19               Next 's appt.: 19  Visit# / total visits: 4/9  Cancels/No Shows: 0/0    Subjective:    Pain:  [x] Yes  [] No Location: neck, back  Pain Rating: (0-10 scale) 5/10, 3/10  Pain altered Tx:  [x] No  [] Yes  Action:    Comments: pt reports she had some N/T in the L UE after last visit. It was not strong feeling, felt vary light. Objective:  Modalities: not today-pt to do at home as needed.  Hot Pack to neck and to back following treatment session for pain control  Precautions:  Exercises:  Exercise Reps/Time Weight/Level Comments   Scifit/Nustep 6min L1          SUPINE      SKTC 5x5\"ea     LTR 10x5\"     HS stretch 2x30\"ea           LUMBAR STAB   With abdominal bracing    Pelvic tilts 10x5\"  Verbal and tactile cues    March  15x  Difficulty maintaining tilt    Alt UE/LE opposite       SLR       Bridge  15 Minimal range          Cervical PROM and traction  10min     Cervical retraction    Hurts pt head         SIDE LYING      Hip abduction 15xea     Clamshells  15xea     Reverse clamshells  15xea           PRONE      Alt hip ext       Alt UE      Alt UE/LE opposite       Prop on elbows       Press ups                  SEATED      Upper trap stretch  3x15\"ea     Cervical retraction  10x5\"     Cervical AROM 5x5\"ea  Rotation, flex/ext         Other:   Not today-massage to L upper trap/neck and posterior shoulder, prone ~ 10min     Specific Instructions for next treatment: continue massage, core strengthening, cervical ROM, lumbar ROM, progress to proper lifting technique as appropriate    Treatment Charges: Mins Units   []  Modalities     [x]  Ther Exercise 30 2   [x]  Manual Therapy 10 1   []  Ther Activities     []  Aquatics     []  Vasocompression     [x]  Other massage      Total Treatment time 40 3       Assessment: [x] Progressing toward goals. Initiated cervical PROM and manual traction today with good results, no increased pain or symptoms. Pt able to complete increased reps with most exercises today, no increased pain. [] No change. [] Other:    Problems at evaluation on 8/15   [x] ? Back Pain: 7/10                     [x] ? Cervical Pain: left-sided, 7/10           [x] ? ROM: decreased lumbar and cervical                      [x] ? Strength:        [x] ? Function: Oswestry Low Back: 22/50, 44% impairment; Neck Disability Index (NDI): 25/50, 50% impairment  [x] Postural Deviations  [] Gait Deviations  [] Other:                            STG: (to be met in 9 treatments)  1. ? Pain:  a. Pt will report decreased neck pain following driving, reaching, and lifting to 2-3/10.  b. Pt will report decreased low back pain following prolonged positioning and lifting to 1-2/10.  2. ? ROM:   a.  Pt will increase cervical AROM by 10 degrees in flexion and extension and left SB and rotation in order to increase

## 2019-08-29 ENCOUNTER — HOSPITAL ENCOUNTER (OUTPATIENT)
Dept: PHYSICAL THERAPY | Facility: CLINIC | Age: 39
Setting detail: THERAPIES SERIES
Discharge: HOME | End: 2019-08-29
Payer: COMMERCIAL

## 2019-08-29 PROCEDURE — G0283 ELEC STIM OTHER THAN WOUND: HCPCS

## 2019-08-29 PROCEDURE — 97110 THERAPEUTIC EXERCISES: CPT

## 2019-09-05 ENCOUNTER — HOSPITAL ENCOUNTER (OUTPATIENT)
Dept: PHYSICAL THERAPY | Facility: CLINIC | Age: 39
Setting detail: THERAPIES SERIES
Discharge: HOME | End: 2019-09-05
Payer: COMMERCIAL

## 2019-09-05 PROCEDURE — 97110 THERAPEUTIC EXERCISES: CPT

## 2019-09-05 PROCEDURE — G0283 ELEC STIM OTHER THAN WOUND: HCPCS

## 2019-09-11 ENCOUNTER — TELEPHONE (OUTPATIENT)
Dept: FAMILY MEDICINE CLINIC | Age: 39
End: 2019-09-11

## 2019-09-13 ENCOUNTER — APPOINTMENT (OUTPATIENT)
Dept: PHYSICAL THERAPY | Facility: CLINIC | Age: 39
End: 2019-09-13
Payer: COMMERCIAL

## 2019-09-18 ENCOUNTER — HOSPITAL ENCOUNTER (OUTPATIENT)
Dept: MRI IMAGING | Facility: CLINIC | Age: 39
Discharge: HOME | End: 2019-09-20
Payer: COMMERCIAL

## 2019-09-18 DIAGNOSIS — S16.1XXA STRAIN OF NECK MUSCLE, INITIAL ENCOUNTER: ICD-10-CM

## 2019-09-18 DIAGNOSIS — S46.812A STRAIN OF LEFT TRAPEZIUS MUSCLE, INITIAL ENCOUNTER: ICD-10-CM

## 2019-09-18 PROCEDURE — 73221 MRI JOINT UPR EXTREM W/O DYE: CPT

## 2019-09-18 PROCEDURE — 72141 MRI NECK SPINE W/O DYE: CPT

## 2020-02-01 ENCOUNTER — APPOINTMENT (OUTPATIENT)
Dept: GENERAL RADIOLOGY | Age: 40
End: 2020-02-01
Payer: COMMERCIAL

## 2020-02-01 ENCOUNTER — HOSPITAL ENCOUNTER (EMERGENCY)
Age: 40
Discharge: HOME | End: 2020-02-01
Attending: EMERGENCY MEDICINE
Payer: COMMERCIAL

## 2020-02-01 VITALS
HEART RATE: 85 BPM | WEIGHT: 225 LBS | HEIGHT: 64 IN | BODY MASS INDEX: 38.41 KG/M2 | TEMPERATURE: 98.1 F | DIASTOLIC BLOOD PRESSURE: 86 MMHG | RESPIRATION RATE: 16 BRPM | SYSTOLIC BLOOD PRESSURE: 150 MMHG | OXYGEN SATURATION: 98 %

## 2020-02-01 PROCEDURE — 71046 X-RAY EXAM CHEST 2 VIEWS: CPT

## 2020-02-01 PROCEDURE — 73030 X-RAY EXAM OF SHOULDER: CPT

## 2020-02-01 PROCEDURE — 73610 X-RAY EXAM OF ANKLE: CPT

## 2020-02-01 PROCEDURE — 99283 EMERGENCY DEPT VISIT LOW MDM: CPT

## 2020-02-01 PROCEDURE — 6370000000 HC RX 637 (ALT 250 FOR IP): Performed by: NURSE PRACTITIONER

## 2020-02-01 RX ORDER — IBUPROFEN 800 MG/1
800 TABLET ORAL ONCE
Status: COMPLETED | OUTPATIENT
Start: 2020-02-01 | End: 2020-02-01

## 2020-02-01 RX ORDER — FLUTICASONE PROPIONATE 50 MCG
1 SPRAY, SUSPENSION (ML) NASAL 2 TIMES DAILY
Qty: 1 BOTTLE | Refills: 0 | Status: SHIPPED | OUTPATIENT
Start: 2020-02-01 | End: 2020-02-25 | Stop reason: SDUPTHER

## 2020-02-01 RX ORDER — METHOCARBAMOL 500 MG/1
500 TABLET, FILM COATED ORAL 3 TIMES DAILY PRN
Qty: 20 TABLET | Refills: 0 | Status: SHIPPED | OUTPATIENT
Start: 2020-02-01 | End: 2020-02-11

## 2020-02-01 RX ORDER — LORATADINE 10 MG/1
10 TABLET ORAL DAILY
Qty: 30 TABLET | Refills: 0 | Status: SHIPPED | OUTPATIENT
Start: 2020-02-01 | End: 2020-02-21

## 2020-02-01 RX ORDER — BENZONATATE 100 MG/1
100 CAPSULE ORAL 3 TIMES DAILY PRN
Qty: 30 CAPSULE | Refills: 0 | Status: SHIPPED | OUTPATIENT
Start: 2020-02-01 | End: 2020-02-08

## 2020-02-01 RX ADMIN — IBUPROFEN 800 MG: 800 TABLET, FILM COATED ORAL at 11:14

## 2020-02-01 ASSESSMENT — PAIN SCALES - GENERAL: PAINLEVEL_OUTOF10: 8

## 2020-02-01 NOTE — ED PROVIDER NOTES
for 7 days    NAPROXEN (NAPROSYN) 250 MG TABLET    Take 1 tablet by mouth 2 times daily (with meals)     ALLERGIES     has No Known Allergies. FAMILY HISTORY     has no family status information on file. SOCIAL HISTORY       Social History     Tobacco Use    Smoking status: Never Smoker    Smokeless tobacco: Never Used   Substance Use Topics    Alcohol use: Yes     Comment: social    Drug use: No     PHYSICAL EXAM     INITIAL VITALS: BP (!) 150/86   Pulse 85   Temp 98.1 °F (36.7 °C) (Oral)   Resp 16   Ht 5' 4\" (1.626 m)   Wt 225 lb (102.1 kg)   SpO2 98%   BMI 38.62 kg/m²    Physical Exam  Constitutional:       Appearance: Normal appearance. HENT:      Mouth/Throat:      Mouth: Mucous membranes are moist.      Pharynx: Oropharynx is clear. Eyes:      General:         Left eye: No discharge. Conjunctiva/sclera: Conjunctivae normal.   Cardiovascular:      Rate and Rhythm: Normal rate and regular rhythm. Pulses: Normal pulses. Pulmonary:      Effort: Pulmonary effort is normal.      Breath sounds: Normal breath sounds. Abdominal:      Palpations: Abdomen is soft. Tenderness: There is no abdominal tenderness. Musculoskeletal:      Comments: Decreased range of motion of the right shoulder. Able to lift to almost shoulder height. No edema or deformity. Diffuse tenderness to the right shoulder. No midline tenderness to the cervical, thoracic or lumbar spine. She has full range of motion of both hips, knees and ankles. She has tenderness to the right lateral ankle that does not extend to the metatarsals   Skin:     General: Skin is warm and dry. Findings: No bruising or erythema. Neurological:      General: No focal deficit present. Mental Status: She is alert and oriented to person, place, and time. Cranial Nerves: No cranial nerve deficit. Sensory: No sensory deficit. Psychiatric:         Mood and Affect: Mood normal.         Thought Content:  Thought content normal.         DIAGNOSTIC RESULTS     RADIOLOGY:All plain film, CT, MRI, and formal ultrasound images (except ED bedside ultrasound) are read by the radiologist, see reports below, unless otherwise noted in MDM or here. Interpretation per the Radiologist below, if available at the time of this note:    XR ANKLE RIGHT (MIN 3 VIEWS)   Final Result   No acute osseus abnormality of the ankle. No fracture or dislocation. XR CHEST STANDARD (2 VW)   Final Result   No acute cardiopulmonary pathology. XR SHOULDER RIGHT (MIN 2 VIEWS)   Final Result   No acute osseus abnormality of the right shoulder. LABS:  Labs Reviewed - No data to display    All other labs were within normal range or not returned as of this dictation. EMERGENCY DEPARTMENT COURSE and DIFFERENTIAL DIAGNOSIS/MDM:   Vitals:    Vitals:    20 1044   BP: (!) 150/86   Pulse: 85   Resp: 16   Temp: 98.1 °F (36.7 °C)   TempSrc: Oral   SpO2: 98%   Weight: 225 lb (102.1 kg)   Height: 5' 4\" (1.626 m)       Medical Decision Makin-year-old female who is afebrile does not appear ill. No distress. Chest x-ray does not have any evidence of pneumonia. Cough but no cervical adenopathy, swelling or exudates to the posterior oropharynx. Rapid strep is not indicated. Symptoms are likely viral and will be treated symptomatically. X-ray of the shoulder and ankle did not have any evidence of fracture. She does have decreased range of motion after falling on outstretched arms and she was educated on the importance of following up with an orthopedist.  An Ace wrap was placed on the right ankle. I did check the placement of the wrap and found to be appropriate. She remains neurovascular intact.          The patient was given the following meds in the ED:  Orders Placed This Encounter   Medications    ibuprofen (ADVIL;MOTRIN) tablet 800 mg    fluticasone (FLONASE) 50 MCG/ACT nasal spray     Si spray by Nasal route 2 times daily     Dispense:  1 Bottle     Refill:  0    benzonatate (TESSALON PERLES) 100 MG capsule     Sig: Take 1 capsule by mouth 3 times daily as needed for Cough     Dispense:  30 capsule     Refill:  0    loratadine (CLARITIN) 10 MG tablet     Sig: Take 1 tablet by mouth daily for 20 days     Dispense:  30 tablet     Refill:  0    methocarbamol (ROBAXIN) 500 MG tablet     Sig: Take 1 tablet by mouth 3 times daily as needed (pain, spasm)     Dispense:  20 tablet     Refill:  0       FINAL IMPRESSION      1. Strain of right shoulder, initial encounter    2. Injury of right ankle, initial encounter    3. URI with cough and congestion          DISPOSITION/PLAN   DISPOSITION Decision To Discharge 02/01/2020 11:57:40 AM      PATIENT REFERRED TO:     Follow-up with a primary care provider.   If you do not have a PCP you can obtain one by calling [phone]        Emery Villareal DO  [address] [address] [phone]      for the shoulder injury      DISCHARGE MEDICATIONS:     New Prescriptions    BENZONATATE (TESSALON PERLES) 100 MG CAPSULE    Take 1 capsule by mouth 3 times daily as needed for Cough    FLUTICASONE (FLONASE) 50 MCG/ACT NASAL SPRAY    1 spray by Nasal route 2 times daily    LORATADINE (CLARITIN) 10 MG TABLET    Take 1 tablet by mouth daily for 20 days    METHOCARBAMOL (ROBAXIN) 500 MG TABLET    Take 1 tablet by mouth 3 times daily as needed (pain, spasm)       CRITICAL CARE TIME       Please note that portions of this note were completed with a voice recognition program.      ALEXEY BEGUM CNP, APRN - CNP  02/01/20 9529

## 2020-02-21 PROBLEM — E88.810 METABOLIC SYNDROME: Status: RESOLVED | Noted: 2017-09-01 | Resolved: 2020-02-21

## 2020-02-21 PROBLEM — E88.81 METABOLIC SYNDROME: Status: RESOLVED | Noted: 2017-09-01 | Resolved: 2020-02-21

## 2020-02-21 PROBLEM — N94.6 DYSMENORRHEA: Status: RESOLVED | Noted: 2017-09-01 | Resolved: 2020-02-21

## 2020-02-25 ENCOUNTER — HOSPITAL ENCOUNTER (OUTPATIENT)
Age: 40
Setting detail: SPECIMEN
Discharge: HOME | End: 2020-02-25
Payer: COMMERCIAL

## 2020-02-25 ENCOUNTER — OFFICE VISIT (OUTPATIENT)
Dept: FAMILY MEDICINE CLINIC | Age: 40
End: 2020-02-25
Payer: COMMERCIAL

## 2020-02-25 VITALS
OXYGEN SATURATION: 90 % | BODY MASS INDEX: 38.96 KG/M2 | TEMPERATURE: 98.7 F | DIASTOLIC BLOOD PRESSURE: 80 MMHG | WEIGHT: 227 LBS | HEART RATE: 90 BPM | SYSTOLIC BLOOD PRESSURE: 136 MMHG

## 2020-02-25 PROBLEM — S46.001A INJURY OF RIGHT ROTATOR CUFF: Status: ACTIVE | Noted: 2020-02-25

## 2020-02-25 PROBLEM — D21.9 FIBROIDS: Status: ACTIVE | Noted: 2020-02-25

## 2020-02-25 PROBLEM — D50.0 IRON DEFICIENCY ANEMIA DUE TO CHRONIC BLOOD LOSS: Status: ACTIVE | Noted: 2020-02-25

## 2020-02-25 LAB
ABSOLUTE EOS #: 0.18 K/UL (ref 0–0.44)
ABSOLUTE IMMATURE GRANULOCYTE: <0.03 K/UL (ref 0–0.3)
ABSOLUTE LYMPH #: 2.2 K/UL (ref 1.1–3.7)
ABSOLUTE MONO #: 0.46 K/UL (ref 0.1–1.2)
ANION GAP SERPL CALCULATED.3IONS-SCNC: 12 MMOL/L (ref 9–17)
BASOPHILS # BLD: 0 % (ref 0–2)
BASOPHILS ABSOLUTE: 0.03 K/UL (ref 0–0.2)
BUN BLDV-MCNC: 8 MG/DL (ref 6–20)
BUN/CREAT BLD: ABNORMAL (ref 9–20)
CALCIUM SERPL-MCNC: 9.1 MG/DL (ref 8.6–10.4)
CHLORIDE BLD-SCNC: 104 MMOL/L (ref 98–107)
CHOLESTEROL/HDL RATIO: 3.8
CHOLESTEROL: 169 MG/DL
CO2: 25 MMOL/L (ref 20–31)
CREAT SERPL-MCNC: 0.93 MG/DL (ref 0.5–0.9)
DIFFERENTIAL TYPE: ABNORMAL
EOSINOPHILS RELATIVE PERCENT: 3 % (ref 1–4)
ESTIMATED AVERAGE GLUCOSE: 128 MG/DL
FERRITIN: 7 UG/L (ref 13–150)
GFR AFRICAN AMERICAN: >60 ML/MIN
GFR NON-AFRICAN AMERICAN: >60 ML/MIN
GFR SERPL CREATININE-BSD FRML MDRD: ABNORMAL ML/MIN/{1.73_M2}
GFR SERPL CREATININE-BSD FRML MDRD: ABNORMAL ML/MIN/{1.73_M2}
GLUCOSE BLD-MCNC: 87 MG/DL (ref 70–99)
HBA1C MFR BLD: 6.1 % (ref 4–6)
HCT VFR BLD CALC: 30.6 % (ref 36.3–47.1)
HDLC SERPL-MCNC: 44 MG/DL
HEMOGLOBIN: 9.6 G/DL (ref 11.9–15.1)
HIV AG/AB: NONREACTIVE
IMMATURE GRANULOCYTES: 0 %
LDL CHOLESTEROL: 102 MG/DL (ref 0–130)
LYMPHOCYTES # BLD: 32 % (ref 24–43)
MCH RBC QN AUTO: 28.5 PG (ref 25.2–33.5)
MCHC RBC AUTO-ENTMCNC: 31.4 G/DL (ref 28.4–34.8)
MCV RBC AUTO: 90.8 FL (ref 82.6–102.9)
MONOCYTES # BLD: 7 % (ref 3–12)
NRBC AUTOMATED: 0 PER 100 WBC
PDW BLD-RTO: 14.8 % (ref 11.8–14.4)
PLATELET # BLD: 553 K/UL (ref 138–453)
PLATELET ESTIMATE: ABNORMAL
PMV BLD AUTO: 9 FL (ref 8.1–13.5)
POTASSIUM SERPL-SCNC: 3.7 MMOL/L (ref 3.7–5.3)
RBC # BLD: 3.37 M/UL (ref 3.95–5.11)
RBC # BLD: ABNORMAL 10*6/UL
SEG NEUTROPHILS: 58 % (ref 36–65)
SEGMENTED NEUTROPHILS ABSOLUTE COUNT: 4.04 K/UL (ref 1.5–8.1)
SODIUM BLD-SCNC: 141 MMOL/L (ref 135–144)
TRIGL SERPL-MCNC: 114 MG/DL
TSH SERPL DL<=0.05 MIU/L-ACNC: 1.37 MIU/L (ref 0.3–5)
VITAMIN B-12: 366 PG/ML (ref 232–1245)
VLDLC SERPL CALC-MCNC: NORMAL MG/DL (ref 1–30)
WBC # BLD: 6.9 K/UL (ref 3.5–11.3)
WBC # BLD: ABNORMAL 10*3/UL

## 2020-02-25 PROCEDURE — 99386 PREV VISIT NEW AGE 40-64: CPT | Performed by: FAMILY MEDICINE

## 2020-02-25 RX ORDER — FERROUS SULFATE 325(65) MG
2 TABLET ORAL DAILY
Qty: 180 TABLET | Refills: 1 | Status: SHIPPED | OUTPATIENT
Start: 2020-02-25

## 2020-02-25 RX ORDER — TIZANIDINE 4 MG/1
4 TABLET ORAL EVERY 8 HOURS PRN
Qty: 60 TABLET | Refills: 3 | Status: SHIPPED | OUTPATIENT
Start: 2020-02-25

## 2020-02-25 RX ORDER — FLUTICASONE PROPIONATE 50 MCG
2 SPRAY, SUSPENSION (ML) NASAL DAILY
Qty: 1 BOTTLE | Refills: 5 | Status: SHIPPED | OUTPATIENT
Start: 2020-02-25

## 2020-02-25 ASSESSMENT — ENCOUNTER SYMPTOMS
EYE PAIN: 0
SINUS PAIN: 0
SORE THROAT: 0
SHORTNESS OF BREATH: 0
SINUS PRESSURE: 0
BLOOD IN STOOL: 0
TROUBLE SWALLOWING: 0

## 2020-02-25 ASSESSMENT — PATIENT HEALTH QUESTIONNAIRE - PHQ9
SUM OF ALL RESPONSES TO PHQ QUESTIONS 1-9: 0
1. LITTLE INTEREST OR PLEASURE IN DOING THINGS: 0
SUM OF ALL RESPONSES TO PHQ9 QUESTIONS 1 & 2: 0
SUM OF ALL RESPONSES TO PHQ QUESTIONS 1-9: 0
2. FEELING DOWN, DEPRESSED OR HOPELESS: 0

## 2020-02-25 NOTE — PROGRESS NOTES
2016    Headache      Past Surgical History:   Procedure Laterality Date     SECTION      OVARIAN CYST REMOVAL      right     No family history on file. Social History     Tobacco Use    Smoking status: Never Smoker    Smokeless tobacco: Never Used   Substance Use Topics    Alcohol use: Yes     Comment: social    Drug use: No       Current Outpatient Medications:     tiZANidine (ZANAFLEX) 4 MG tablet, Take 1 tablet by mouth every 8 hours as needed (muscle spasm), Disp: 60 tablet, Rfl: 3    fluticasone (FLONASE) 50 MCG/ACT nasal spray, 2 sprays by Nasal route daily, Disp: 1 Bottle, Rfl: 5    ibuprofen (ADVIL;MOTRIN) 600 MG tablet, Take 1 tablet by mouth every 6 hours as needed for Pain, Disp: 40 tablet, Rfl: 0    albuterol sulfate  (90 BASE) MCG/ACT inhaler, Inhale 2 puffs into the lungs every 6 hours as needed for Wheezing, Disp: 1 Inhaler, Rfl: 3    Subjective:     Review of Systems   Constitutional: Negative for appetite change, diaphoresis, fever and unexpected weight change. HENT: Positive for postnasal drip. Negative for ear pain, sinus pressure, sinus pain, sore throat and trouble swallowing. Eyes: Negative for pain. Respiratory: Negative for shortness of breath. Cardiovascular: Negative for chest pain. Gastrointestinal: Negative for blood in stool. Genitourinary: Positive for menstrual problem. Negative for vaginal bleeding, vaginal discharge and vaginal pain. Musculoskeletal: Positive for myalgias. Negative for gait problem. Skin: Negative for pallor. Neurological: Negative for seizures. Psychiatric/Behavioral: Negative for dysphoric mood and suicidal ideas. Objective:     /80   Pulse 90   Temp 98.7 °F (37.1 °C) (Oral)   Wt 227 lb (103 kg)   SpO2 90%   BMI 38.96 kg/m²     Physical Exam  Constitutional:       General: She is not in acute distress. Appearance: Normal appearance. She is well-developed.  She is not ill-appearing or diaphoretic. HENT:      Head: Normocephalic. Mouth/Throat:      Mouth: Mucous membranes are moist.      Pharynx: No oropharyngeal exudate or posterior oropharyngeal erythema. Eyes:      Conjunctiva/sclera: Conjunctivae normal.   Neck:      Musculoskeletal: Normal range of motion and neck supple. Thyroid: No thyromegaly. Cardiovascular:      Rate and Rhythm: Normal rate. Heart sounds: Normal heart sounds. No murmur. Pulmonary:      Effort: Pulmonary effort is normal. No respiratory distress. Breath sounds: Normal breath sounds. No wheezing. Abdominal:      Palpations: Abdomen is soft. Tenderness: There is no guarding or rebound. Musculoskeletal:      Right shoulder: She exhibits decreased range of motion. She exhibits no swelling and no spasm. Comments: Difficulty with forward flexion of the right shoulder above 90 degrees actively, able to move the shoulder completely but with pain on passive forward flexion   Lymphadenopathy:      Cervical: No cervical adenopathy. Skin:     General: Skin is warm. Findings: No rash. Neurological:      Mental Status: She is alert. Psychiatric:         Behavior: Behavior normal.         Thought Content: Thought content normal.         Judgment: Judgment normal.         Assessment & Plan:      1. Establishing care with new doctor, encounter for    2. Routine general medical examination at a health care facility  Recommended healthy diet / daily exercise, avoidance of alcohol / smoking / recreational drugs  Recommended bi-annual dental exam / yearly vision exam   Mammogram after age 36, pap smear after age 24, colonoscopy after age 48  STD testing declined  - Basic Metabolic Panel; Future  - CBC Auto Differential; Future  - Hemoglobin A1C; Future  - Lipid Panel; Future  - HIV Screen; Future  - Ferritin; Future  - Vitamin B12; Future  - TSH with Reflex; Future    3.  Abnormal uterine bleeding (AUB)  She declined treatment options for this, blood work ordered  - CBC Auto Differential; Future  - Ferritin; Future  - Vitamin B12; Future  - TSH with Reflex; Future    4. Obesity (BMI 30-39. 9)  Had a detailed discussion about weight loss, portion control, calorie counting via [employer], taking stairs, parking far from destination, increasing intensity of physical activity. Pt is motivated to lose weight.  - Hemoglobin A1C; Future  - Lipid Panel; Future    5. Prediabetes  - Hemoglobin A1C; Future    6. Injury of right rotator cuff, subsequent encounter  Recommended physical therapy, she will check with her Workmen's Comp. physician to make sure this is okay to do through my order given the pre-existing injury. Rx for Zanaflex, advised on risk of sedation.  - [facility] [place]  - tiZANidine (ZANAFLEX) 4 MG tablet; Take 1 tablet by mouth every 8 hours as needed (muscle spasm)  Dispense: 60 tablet; Refill: 3    7. Breast cancer screening by mammogram  - [facility] DIGITAL SCREEN W CAD BILATERAL; Future    8. Post-nasal drainage  - fluticasone (FLONASE) 50 MCG/ACT nasal spray; 2 sprays by Nasal route daily  Dispense: 1 Bottle; Refill: 5    Call or return to clinic prn if these symptoms worsen or fail to improve as anticipated. I have reviewed the instructions with the patient, answering all questions to their satisfaction.     Electronically signed by Aron Brower MD on 2/25/2020 at 12:08 PM

## 2020-03-18 ENCOUNTER — HOSPITAL ENCOUNTER (OUTPATIENT)
Dept: MAMMOGRAPHY | Age: 40
Discharge: HOME | End: 2020-03-20
Payer: COMMERCIAL

## 2020-03-18 ENCOUNTER — HOSPITAL ENCOUNTER (OUTPATIENT)
Dept: PHYSICAL THERAPY | Facility: CLINIC | Age: 40
Setting detail: THERAPIES SERIES
Discharge: HOME | End: 2020-03-18
Payer: COMMERCIAL

## 2020-03-18 PROCEDURE — 97161 PT EVAL LOW COMPLEX 20 MIN: CPT

## 2020-03-18 PROCEDURE — 77063 BREAST TOMOSYNTHESIS BI: CPT

## 2020-03-18 PROCEDURE — 97110 THERAPEUTIC EXERCISES: CPT

## 2020-05-01 ENCOUNTER — TELEPHONE (OUTPATIENT)
Dept: FAMILY MEDICINE CLINIC | Age: 40
End: 2020-05-01

## 2020-05-05 ENCOUNTER — TELEMEDICINE (OUTPATIENT)
Dept: FAMILY MEDICINE CLINIC | Age: 40
End: 2020-05-05
Payer: COMMERCIAL

## 2020-05-05 PROCEDURE — 99204 OFFICE O/P NEW MOD 45 MIN: CPT | Performed by: FAMILY MEDICINE

## 2020-05-05 ASSESSMENT — PATIENT HEALTH QUESTIONNAIRE - PHQ9
SUM OF ALL RESPONSES TO PHQ9 QUESTIONS 1 & 2: 0
2. FEELING DOWN, DEPRESSED OR HOPELESS: 0
1. LITTLE INTEREST OR PLEASURE IN DOING THINGS: 0
SUM OF ALL RESPONSES TO PHQ QUESTIONS 1-9: 0
SUM OF ALL RESPONSES TO PHQ QUESTIONS 1-9: 0

## 2020-05-05 NOTE — PROGRESS NOTES
Diagnosis Date    Arthritis     BMI 40.0-44.9, adult ([facility] 2016    Headache    ,   Past Surgical History:   Procedure Laterality Date     SECTION      OVARIAN CYST REMOVAL      right   ,   Social History     Tobacco Use    Smoking status: Never Smoker    Smokeless tobacco: Never Used   Substance Use Topics    Alcohol use: Yes     Comment: social    Drug use: No   , History reviewed. No pertinent family history. PHYSICAL EXAMINATION:  [ INSTRUCTIONS:  \"[x]\" Indicates a positive item  \"[]\" Indicates a negative item  -- DELETE ALL ITEMS NOT EXAMINED]  Vital Signs: (As obtained by patient/caregiver or practitioner observation)    Blood pressure-  Heart rate-    Respiratory rate-    Temperature-  Pulse oximetry-     Constitutional: [x] Appears well-developed and well-nourished [x] No apparent distress      [] Abnormal-   Mental status  [x] Alert and awake  [x] Oriented to person/place/time [x]Able to follow commands      Eyes:  EOM    [x]  Normal  [] Abnormal-  Sclera  [x]  Normal  [] Abnormal -         Discharge [x]  None visible  [] Abnormal -    HENT:   [] Normocephalic, atraumatic.   [] Abnormal   [] Mouth/Throat: Mucous membranes are moist.     External Ears [x] Normal  [] Abnormal-     Neck: [x] No visualized mass     Pulmonary/Chest: [x] Respiratory effort normal.  [x] No visualized signs of difficulty breathing or respiratory distress        [] Abnormal-      Musculoskeletal:   [x] Normal gait with no signs of ataxia         [x] Normal range of motion of neck        [] Abnormal- full exam of r shoulder needs to be done in face to face visit     Neurological:        [x] No Facial Asymmetry (Cranial nerve 7 motor function) (limited exam to video visit)          [x] No gaze palsy        [] Abnormal-         Skin:        [x] No significant exanthematous lesions or discoloration noted on facial skin         [] Abnormal-            Psychiatric:       [x] Normal Affect [] No Hallucinations

## 2020-07-01 ENCOUNTER — OFFICE VISIT (OUTPATIENT)
Dept: OBGYN CLINIC | Age: 40
End: 2020-07-01
Payer: COMMERCIAL

## 2020-07-01 ENCOUNTER — HOSPITAL ENCOUNTER (OUTPATIENT)
Age: 40
Setting detail: SPECIMEN
Discharge: HOME | End: 2020-07-01
Payer: COMMERCIAL

## 2020-07-01 VITALS
HEART RATE: 88 BPM | DIASTOLIC BLOOD PRESSURE: 86 MMHG | HEIGHT: 64 IN | WEIGHT: 231.6 LBS | SYSTOLIC BLOOD PRESSURE: 128 MMHG | BODY MASS INDEX: 39.54 KG/M2

## 2020-07-01 PROBLEM — N39.41 URGE URINARY INCONTINENCE: Status: ACTIVE | Noted: 2020-07-01

## 2020-07-01 LAB
ABSOLUTE EOS #: 0.17 K/UL (ref 0–0.44)
ABSOLUTE IMMATURE GRANULOCYTE: <0.03 K/UL (ref 0–0.3)
ABSOLUTE LYMPH #: 2.02 K/UL (ref 1.1–3.7)
ABSOLUTE MONO #: 0.38 K/UL (ref 0.1–1.2)
BASOPHILS # BLD: 1 % (ref 0–2)
BASOPHILS ABSOLUTE: 0.03 K/UL (ref 0–0.2)
DIFFERENTIAL TYPE: ABNORMAL
EOSINOPHILS RELATIVE PERCENT: 3 % (ref 1–4)
FERRITIN: 14 UG/L (ref 13–150)
HCT VFR BLD CALC: 33 % (ref 36.3–47.1)
HEMOGLOBIN: 10.4 G/DL (ref 11.9–15.1)
IMMATURE GRANULOCYTES: 0 %
IRON SATURATION: 13 % (ref 20–55)
IRON: 45 UG/DL (ref 37–145)
LYMPHOCYTES # BLD: 35 % (ref 24–43)
MCH RBC QN AUTO: 29.5 PG (ref 25.2–33.5)
MCHC RBC AUTO-ENTMCNC: 31.5 G/DL (ref 28.4–34.8)
MCV RBC AUTO: 93.8 FL (ref 82.6–102.9)
MONOCYTES # BLD: 7 % (ref 3–12)
NRBC AUTOMATED: 0 PER 100 WBC
PDW BLD-RTO: 15 % (ref 11.8–14.4)
PLATELET # BLD: 370 K/UL (ref 138–453)
PLATELET ESTIMATE: ABNORMAL
PMV BLD AUTO: 9.3 FL (ref 8.1–13.5)
RBC # BLD: 3.52 M/UL (ref 3.95–5.11)
RBC # BLD: ABNORMAL 10*6/UL
SEG NEUTROPHILS: 54 % (ref 36–65)
SEGMENTED NEUTROPHILS ABSOLUTE COUNT: 3.14 K/UL (ref 1.5–8.1)
TOTAL IRON BINDING CAPACITY: 353 UG/DL (ref 250–450)
UNSATURATED IRON BINDING CAPACITY: 308 UG/DL (ref 112–347)
WBC # BLD: 5.8 K/UL (ref 3.5–11.3)
WBC # BLD: ABNORMAL 10*3/UL

## 2020-07-01 PROCEDURE — 1036F TOBACCO NON-USER: CPT | Performed by: OBSTETRICS & GYNECOLOGY

## 2020-07-01 PROCEDURE — 99203 OFFICE O/P NEW LOW 30 MIN: CPT | Performed by: OBSTETRICS & GYNECOLOGY

## 2020-07-01 PROCEDURE — G8427 DOCREV CUR MEDS BY ELIG CLIN: HCPCS | Performed by: OBSTETRICS & GYNECOLOGY

## 2020-07-01 PROCEDURE — G8417 CALC BMI ABV UP PARAM F/U: HCPCS | Performed by: OBSTETRICS & GYNECOLOGY

## 2020-07-01 RX ORDER — GABAPENTIN 300 MG/1
300 CAPSULE ORAL DAILY
COMMUNITY
Start: 2020-05-29

## 2020-07-01 NOTE — PROGRESS NOTES
Yarelis Palomino  7/1/2020                         Primary Care Physician: [facility],     CC:   Chief Complaint   Patient presents with    Menorrhagia         HPI: Yarelis Palomino is a 36 y.o. female Rylee Sa Patient's last menstrual period was 06/06/2020. The patient was seen and examined. She is here to establish care. She wants to discuss her heavy and painful periods. She reports that they are usually regular but have been lasting about 7-9 days. Sometimes the cramping is so severe that she has associated nausea. She uses motrin and a heating pad with little relief. She has tried the nuvaring in the past but had nausea with it. Tried the Mirena IUD previously but thought that it caused recurrent BV. However, she does admit to frequently taking tub baths. She had bloodwork completed on 2/2/520 per her PCP and her hgb was 9.6, A1c 6.1, and TSH 1.37. She is on iron supplementation. Patient also reports an increase in urge urinary incontinence. She is concerned that everything is due to her uterine fibroids. Her bowel habits are regular. She denies any bloating. She denies dysuria. She denies vaginal discharge. She is sexually active with a male partner. She uses condoms for contraception and is not desiring pregnancy.     Depression Screen: Symptoms of decreased mood absent  Symptoms of anhedonia absent  **If either question is answered in a  positive fashion then complete the PHQ9 Scoring Evaluation and make the appropriate referral**    REVIEW OF SYSTEMS:  Constitutional: negative fever, negative chills  HEENT: negative visual disturbances, negative headaches  Respiratory: negative dyspnea, negative cough  Cardiovascular: negative chest pain,  negative palpitations  Gastrointestinal: negative abdominal pain, negative RUQ pain, negative N/V, negative diarrhea, negative constipation  Genitourinary: positive urge UI, positive heavy and painful menses, negative dysuria, negative vaginal discharge  Dermatological: negative rash  Hematologic: negative bruising  Immunologic/Lymphatic: negative recent illness, negative recent sick contact  Musculoskeletal: negative back pain, negative myalgias, negative arthralgias  Neurological:  negative dizziness, negative weakness  Behavior/Psych: negative depression, negative anxiety      GYNECOLOGICAL HISTORY:  Age of Menarche: 6  Age of Menopause: n/a     Sexually Active: has sex with males  STD History:  h/o trich in 2018    Pap History: Last PAP was NILM neg HRHPV 16. Colposcopy History: denies    Permanent Sterilization: no  Reversible Birth Control: no  Hormone Replacement Exposure: no    OBSTETRICAL HISTORY:  OB History    Para Term  AB Living   1 1 1 0 0 1   SAB TAB Ectopic Molar Multiple Live Births   0 0 0 0 0 0      # Outcome Date GA Lbr Yfn/2nd Weight Sex Delivery Anes PTL Lv   1 Term      CS-Unspec          PAST MEDICAL HISTORY:   has a past medical history of Arthritis, BMI 40.0-44.9, adult ([address] and Headache. PAST SURGICAL HISTORY:   has a past surgical history that includes  section and ovarian cyst removal.    ALLERGIES:  is allergic to mobic [meloxicam]. MEDICATIONS:  Prior to Admission medications    Medication Sig Start Date End Date Taking? Authorizing Provider   gabapentin (NEURONTIN) 300 MG capsule Take 300 mg by mouth 3 times daily.  20  Yes Historical Provider, MD   tiZANidine (ZANAFLEX) 4 MG tablet Take 1 tablet by mouth every 8 hours as needed (muscle spasm) 20  Yes Chetan Pena MD   fluticasone [facility]) 50 MCG/ACT nasal spray 2 sprays by Nasal route daily 20  Yes Chetan Pena MD   ferrous sulfate (LUL-KEE) 325 (65 Fe) MG tablet Take 2 tablets by mouth daily 20  Yes Chetan Pena MD   ibuprofen (ADVIL;MOTRIN) 600 MG tablet Take 1 tablet by mouth every 6 hours as needed for Pain 19  Yes ALEXEY Walden CNP   albuterol sulfate  (90 BASE) MCG/ACT inhaler Inhale 2 puffs into the lungs every 6 hours as needed for Wheezing 3/27/17  Yes Anthony Cullen MD       FAMILY HISTORY:  Family History of Breast, Ovarian, Colon or Uterine Cancer: No   family history is not on file. SOCIAL HISTORY:   reports that she has never smoked. She has never used smokeless tobacco. She reports current alcohol use. She reports that she does not use drugs. HEALTH MAINTENANCE:  Immunization status: stated as up to date, no records available   Date of Last Mammogram: BIRADS 1 on 3/18/20  Date of Last Colonoscopy: n/a  Date of Last Bone Density: n/a    VITALS:  Vitals:    07/01/20 0912   BP: 128/86   Site: Left Lower Arm   Position: Sitting   Cuff Size: Large Adult   Pulse: 88   Weight: 231 lb 9.6 oz (105.1 kg)   Height: 5' 4\" (1.626 m)                                                                                                                                                                         PHYSICAL EXAM:   General Appearance: Appears healthy. Alert; in no acute distress. Pleasant. Skin: Skin color, texture, turgor normal. No rashes or lesions. HEENT: normocephalic and atraumatic   Respiratory: Normal expansion. Clear to auscultation. No rales, rhonchi, or wheezing. Cardiovascular: normal rate and normal S1 and S2  Breast:  Deferred to annual  Abdomen: soft, non-tender, non-distended, no right upper quadrant tenderness and no CVA tenderness   Pelvic Exam: deferred to EMB   Musculoskeletal: no gross abnormalities  Extremities: non-tender BLE and non-edematous  Psych:  oriented to time, place and person, mood and affect are within normal limits     DATA:  No results found for this visit on 07/01/20. ASSESSMENT & PLAN:    Andrea Peoples is a 36 y.o. female Mac Hernandez Patient's last menstrual period was 06/06/2020.   - Reviewed that AUB likely secondary to fibroids +/- elv A1c.  Recommended repeat ultrasound and plan for EMB.   - Encouraged weight loss with diet and exercise. - Reviewed medical management options for dysmenorrhea +/- heavy menses, including side effect profiles and dosing regimens. All questions answered. Patient is considering another Mirena IUD. - Dicussed appropriate genital hygeine. Discussed option for prophylaxis of recurrent BV in the future. Patient Active Problem List    Diagnosis Date Noted    Urge urinary incontinence 2020    Injury of right rotator cuff 2020    Fibroids 2020    Iron deficiency anemia due to chronic blood loss 2020     On iron supplementation      Abnormal uterine bleeding (AUB) 2017            Obesity, Class II, BMI 35-39.9 2016    Hx of  section 2016       Return in about 3 weeks (around 2020) for EMB. No Patient Care Coordination Note on file. Counseling Completed:    Discussed need for repeat pap as per American Society for Colposcopy and Cervical Pathology guidelines. Discussed need for mammograms every 1 year, If >42 yo and last mammogram was negative. Birth control and barrier recommendations reviewed. Discussed STD counseling and prevention. Hereditary Breast, Ovarian, Colon and Uterine Cancer screening done. Tobacco & Secondary smoke risks reviewed; with recommendation for cessation and avoidance. Routine health maintenance per patients PCP    Diagnosis Orders   1. Encounter to establish care     2. Fibroids  US PELVIS COMPLETE    US NON OB TRANSVAGINAL   3. Abnormal uterine bleeding (AUB)  US PELVIS COMPLETE    US NON OB TRANSVAGINAL   4. Iron deficiency anemia due to chronic blood loss     5.  Urge urinary incontinence            See-DO [facility] [place]  2020 9:16 AM

## 2020-07-01 NOTE — PATIENT INSTRUCTIONS
Patient Education        Bladder Training: Care Instructions  Your Care Instructions     Bladder training is used to treat urge incontinence and stress incontinence. Urge incontinence means that the need to urinate comes on so fast that you can't get to a toilet in time. Stress incontinence means that you leak urine because of pressure on your bladder. For example, it may happen when you laugh, cough, or lift something heavy. Bladder training can increase how long you can wait before you have to urinate. It can also help your bladder hold more urine. And it can give you better control over the urge to urinate. It is important to remember that bladder training takes a few weeks to a few months to make a difference. You may not see results right away, but don't give up. Follow-up care is a key part of your treatment and safety. Be sure to make and go to all appointments, and call your doctor if you are having problems. It's also a good idea to know your test results and keep a list of the medicines you take. How can you care for yourself at home? Work with your doctor to come up with a bladder training program that is right for you. You may use one or more of the following methods. Delayed urination  · In the beginning, try to keep from urinating for 5 minutes after you first feel the need to go. · While you wait, take deep, slow breaths to relax. Kegel exercises can also help you delay the need to go to the bathroom. · After some practice, when you can easily wait 5 minutes to urinate, try to wait 10 minutes before you urinate. · Slowly increase the waiting period until you are able to control when you have to urinate. Scheduled urination  · Empty your bladder when you first wake up in the morning. · Schedule times throughout the day when you will urinate. · Start by going to the bathroom every hour, even if you don't need to go. · Slowly increase the time between trips to the bathroom.   · When you have found a schedule that works well for you, keep doing it. · If you wake up during the night and have to urinate, do it. Apply your schedule to waking hours only. Kegel exercises  These tighten and strengthen pelvic muscles, which can help you control the flow of urine. To do Kegel exercises:  · Squeeze the same muscles you would use to stop your urine. Your belly and thighs should not move. · Hold the squeeze for 3 seconds, and then relax for 3 seconds. · Start with 3 seconds. Then add 1 second each week until you are able to squeeze for 10 seconds. · Repeat the exercise 10 to 15 times a session. Do three or more sessions a day. When should you call for help? Watch closely for changes in your health, and be sure to contact your doctor if:  · Your incontinence is getting worse. · You do not get better as expected. Where can you learn more? Go to [email or web address] and sign in to your [email or web address] account. Enter V846 in the Dreamforge box to learn more about \"Bladder Training: Care Instructions. \"     If you do not have an account, please click on the \"Sign Up Now\" link. Current as of: August 22, 2019               Content Version: 12.5  © 2242-5746 Healthwise, Incorporated. Care instructions adapted under license by [facility] ([facility]). If you have questions about a medical condition or this instruction, always ask your healthcare professional. [address] any warranty or liability for your use of this information.

## 2020-07-08 ENCOUNTER — OFFICE VISIT (OUTPATIENT)
Dept: FAMILY MEDICINE CLINIC | Age: 40
End: 2020-07-08
Payer: COMMERCIAL

## 2020-07-08 VITALS
BODY MASS INDEX: 38.89 KG/M2 | SYSTOLIC BLOOD PRESSURE: 113 MMHG | HEIGHT: 64 IN | HEART RATE: 79 BPM | DIASTOLIC BLOOD PRESSURE: 78 MMHG | TEMPERATURE: 97.8 F | WEIGHT: 227.8 LBS | OXYGEN SATURATION: 99 %

## 2020-07-08 PROCEDURE — G8417 CALC BMI ABV UP PARAM F/U: HCPCS | Performed by: FAMILY MEDICINE

## 2020-07-08 PROCEDURE — 1036F TOBACCO NON-USER: CPT | Performed by: FAMILY MEDICINE

## 2020-07-08 PROCEDURE — 99213 OFFICE O/P EST LOW 20 MIN: CPT | Performed by: FAMILY MEDICINE

## 2020-07-08 PROCEDURE — G8427 DOCREV CUR MEDS BY ELIG CLIN: HCPCS | Performed by: FAMILY MEDICINE

## 2020-07-08 NOTE — PROGRESS NOTES
[facility]  [address] [place] [address] [address] [facility] [address]  Dept: [phone]      Jose Alfredo Melo is a 36 y.o. female who presents today for follow up on her  medical conditions as noted below. Chief Complaint   Patient presents with    Arm Pain     Rt arm        Patient Active Problem List:     Obesity, Class II, BMI 35-39.9     Hx of  section     Abnormal uterine bleeding (AUB)     Injury of right rotator cuff     Fibroids     Iron deficiency anemia due to chronic blood loss     Urge urinary incontinence     Past Medical History:   Diagnosis Date    Arthritis     [facility] 40.[facility]-44.9[facility]    Fibroid     Headache     Urge urinary incontinence 2020      Past Surgical History:   Procedure Laterality Date     SECTION      OVARIAN CYST REMOVAL      right     Family History   Problem Relation Age of Onset    Colon Cancer Neg Hx     Breast Cancer Neg Hx     Ovarian Cancer Neg Hx     Uterine Cancer Neg Hx        Current Outpatient Medications   Medication Sig Dispense Refill    gabapentin (NEURONTIN) 300 MG capsule Take 300 mg by mouth daily.  tiZANidine (ZANAFLEX) 4 MG tablet Take 1 tablet by mouth every 8 hours as needed (muscle spasm) 60 tablet 3    fluticasone (FLONASE) 50 MCG/ACT nasal spray 2 sprays by Nasal route daily 1 Bottle 5    ferrous sulfate (LUL-KEE) 325 (65 Fe) MG tablet Take 2 tablets by mouth daily 180 tablet 1    ibuprofen (ADVIL;MOTRIN) 600 MG tablet Take 1 tablet by mouth every 6 hours as needed for Pain 40 tablet 0    albuterol sulfate  (90 BASE) MCG/ACT inhaler Inhale 2 puffs into the lungs every 6 hours as needed for Wheezing 1 Inhaler 3     No current facility-administered medications for this visit.       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  physician  Continue on iron  And follow-up labs in 3 months    Electronically signed by Bebo Ruiz DO on 7/8/2020 at 9:16 AM

## 2020-07-17 ENCOUNTER — HOSPITAL ENCOUNTER (OUTPATIENT)
Dept: ULTRASOUND IMAGING | Age: 40
Discharge: HOME | End: 2020-07-19
Payer: COMMERCIAL

## 2020-07-17 PROCEDURE — 76856 US EXAM PELVIC COMPLETE: CPT

## 2020-07-17 PROCEDURE — 76830 TRANSVAGINAL US NON-OB: CPT

## 2020-07-22 ENCOUNTER — HOSPITAL ENCOUNTER (OUTPATIENT)
Age: 40
Setting detail: SPECIMEN
Discharge: HOME | End: 2020-07-22
Payer: COMMERCIAL

## 2020-07-22 ENCOUNTER — PROCEDURE VISIT (OUTPATIENT)
Dept: OBGYN CLINIC | Age: 40
End: 2020-07-22
Payer: COMMERCIAL

## 2020-07-22 VITALS
HEIGHT: 64 IN | DIASTOLIC BLOOD PRESSURE: 88 MMHG | BODY MASS INDEX: 39.44 KG/M2 | HEART RATE: 86 BPM | WEIGHT: 231 LBS | SYSTOLIC BLOOD PRESSURE: 135 MMHG

## 2020-07-22 PROCEDURE — 58100 BIOPSY OF UTERUS LINING: CPT | Performed by: OBSTETRICS & GYNECOLOGY

## 2020-07-22 NOTE — PATIENT INSTRUCTIONS
Patient Education        Endometrial Biopsy: About This Test  What is it? An endometrial biopsy is a way for your doctor to take a small sample of the lining of the uterus (endometrium). The sample is looked at under a microscope for abnormal cells. An endometrial biopsy helps your doctor find problems in the endometrium. Why is this test done? An endometrial biopsy is done to check for cancer of the uterus. The test is also done if you have abnormal bleeding from your uterus. The test results show how your body's hormones are affecting the lining of the uterus, such as during menopause. How do you prepare for the test?  · If you take aspirin or some other blood thinner, ask your doctor if you should stop taking it before your test. Make sure that you understand exactly what your doctor wants you to do. These medicines increase the risk of bleeding. · Ask your doctor if you should take a pain reliever, such as ibuprofen (Advil or Motrin), 30 to 60 minutes before the test. This can help reduce any cramping pain that the test can cause. How is the test done? · You will lie on an exam table. Your feet will be in stirrups. · The doctor may use a spray or injection to numb your cervix. The cervix is the opening to the uterus. · The doctor will use a tool called a speculum to see the cervix. · Then the doctor will pass a thin tube through the cervix to take a sample of the uterus lining. · The sample is sent to a lab. How long does the test take? The test will take about 5 to 15 minutes. What happens after the test?  · You will probably be able to go home right away. · You likely will have mild vaginal bleeding and may have cramps for a few days after the test. The cramps may feel like bad menstrual cramps. How can you care for yourself at home? · Ask your doctor if you can take an over-the-counter pain medicine, such as acetaminophen (Tylenol), ibuprofen (Advil, Motrin), or naproxen (Aleve).  Be safe with medicines. Read and follow all instructions on the label. · Use pads or tampons for vaginal bleeding or discharge. · You may return to all your usual activities (including sex) when you feel like it. Follow-up care is a key part of your treatment and safety. Be sure to make and go to all appointments, and call your doctor if you are having problems. It's also a good idea to keep a list of the medicines you take. Ask your doctor when you can expect to have your test results. Where can you learn more? Go to [email or web address]://[email or web address] org and sign in to your Content Raven account. Enter [phone] in the Databanq box to learn more about \"Endometrial Biopsy: About This Test.\"     If you do not have an account, please click on the \"Sign Up Now\" link. Current as of: November 8, 2019               Content Version: 12.5  © 8868-8559 Healthwise, Incorporated. Care instructions adapted under license by [facility] ([facility]). If you have questions about a medical condition or this instruction, always ask your healthcare professional. [place] [address] any warranty or liability for your use of this information.

## 2020-07-22 NOTE — PROGRESS NOTES
[facility]  [facility] - [address] [facility]  Dept: [phone]      7/22/2020    Arvella Eisenmenger  Patient's last menstrual period was 07/02/2020. Chief Complaint   Patient presents with    Procedure       Urine pregnancy test: negative     Blood pressure 135/88, pulse 86, height 5' 4\" (1.626 m), weight 231 lb (104.8 kg), last menstrual period 07/02/2020, not currently breastfeeding. The patient was counseled on the procedure. Risks, benefits and alternatives were reviewed. The patient is aware that this is diagnostic and not curative and a second procedure may be needed. A consent was reviewed and obtained. The patient was positioned comfortably on the exam table. After a bi-manual exam; the uterus was found to be anteverted with a size of 14 cm. There were no adnexal masses and the bladder was smooth, non-tender and without palpable masses. A sterile speculum was placed into the vagina and the cervix was identified. It was stabilized with a single tooth tenaculum. It was cleansed with betadine, gently dilated with os finders, and the aspirator was then gently passed into the endometrial cavity. Tissue was obtained and sent to pathology. The tenaculum was removed and hemostasis was noted. The patient tolerated the procedure well. Post procedure restrictions were reviewed and given to the patient. All counts and instruments were correct at the end of the procedure. Assessment:   Diagnosis Orders   1. Abnormal uterine bleeding (AUB)     2. Fibroids     3.  Iron deficiency anemia due to chronic blood loss       Patient Active Problem List    Diagnosis Date Noted    Urge urinary incontinence 07/01/2020    Injury of right rotator cuff 02/25/2020    Fibroids 02/25/2020    Iron deficiency anemia due to chronic blood loss 02/25/2020     On iron supplementation      Abnormal uterine bleeding (AUB) 08/09/2017            Obesity, Class II, BMI 35-39.9 2016    Hx of  section 2016         PLAN:  Reviewed ultrasound results and discussed slight increase in size of uterus and fibroids. Reviewed medical and surgical management options of AUB-L. All questions answered. Will call to review the pathology of the biopsy and for further recommendations. Patient is desiring Mirena IUD.       Loman Canavan So, DO [facility] [place]  2020 9:30 AM

## 2020-07-24 LAB — SURGICAL PATHOLOGY REPORT: NORMAL

## 2020-08-05 ENCOUNTER — PROCEDURE VISIT (OUTPATIENT)
Dept: OBGYN CLINIC | Age: 40
End: 2020-08-05
Payer: COMMERCIAL

## 2020-08-05 VITALS
DIASTOLIC BLOOD PRESSURE: 78 MMHG | HEIGHT: 64 IN | BODY MASS INDEX: 39.09 KG/M2 | HEART RATE: 90 BPM | SYSTOLIC BLOOD PRESSURE: 116 MMHG | WEIGHT: 229 LBS

## 2020-08-05 PROBLEM — N76.0 BV (BACTERIAL VAGINOSIS): Status: ACTIVE | Noted: 2020-08-05

## 2020-08-05 PROBLEM — B96.89 BV (BACTERIAL VAGINOSIS): Status: ACTIVE | Noted: 2020-08-05

## 2020-08-05 LAB
CONTROL: PRESENT
PREGNANCY TEST URINE, POC: NEGATIVE

## 2020-08-05 PROCEDURE — 58300 INSERT INTRAUTERINE DEVICE: CPT | Performed by: OBSTETRICS & GYNECOLOGY

## 2020-08-05 PROCEDURE — 81025 URINE PREGNANCY TEST: CPT | Performed by: OBSTETRICS & GYNECOLOGY

## 2020-08-05 RX ORDER — FLUCONAZOLE 150 MG/1
150 TABLET ORAL
Qty: 1 TABLET | Refills: 0 | Status: SHIPPED | OUTPATIENT
Start: 2020-08-05 | End: 2021-01-03

## 2020-08-05 RX ORDER — METRONIDAZOLE 500 MG/1
2000 TABLET ORAL
Qty: 24 TABLET | Refills: 0 | Status: SHIPPED | OUTPATIENT
Start: 2020-08-05 | End: 2021-01-03

## 2020-08-05 NOTE — PROGRESS NOTES
[facility]YSICIANS  [facility] [address]  [id number]   Dept: [phone]      OB/GYN   Procedure Note    Derrek Nails  8/5/2020                       Primary Care Physician: Gloria Saldivar DO      Subjective:   Derrek Nails 36 y.o. female Margarito Stover is here for previously scheduled Mirena IUD due to history of AUB. Patient's last menstrual period was 08/01/2020. [place] She has no complaints today. She is known to have heavy and painful menses that interfere with her ADL's. She has tried NSAIDS in the past with little success. She wishes to continue to utilize barrier contraception for family planning and STD precautions. The side effect profile of the IUD was reviewed. She reports possible h/o recurrent BV with previous Mirena IUD and desires prophylaxis. All other forms of family planning and options for treatment of her dysmenorrhea were reviewed with the patient. The patient denies use of tobacco products. The patient denies any family member or herself as having a blood clot in their leg, lung, brain or that any member of her family has had a sudden cardiac death under the age of 35 yo. Vitals:   Blood pressure 116/78, pulse 90, height 5' 4\" (1.626 m), weight 229 lb (103.9 kg), last menstrual period 08/01/2020, not currently breastfeeding. Lab:  Pregnancy Test:  Lab Results   Component Value Date    PREGTESTUR negative 10/16/2017    HCG NEGATIVE 01/04/2019    HCGQUANT <1 09/01/2017          Procedure: Insertion of Mirena IUD    Indications: AUB, dysmenorrhea     Procedure Details: The patient was counseled on the procedure. Risks, benefits and alternatives were reviewed. The patient is aware that this is diagnostic and not curative and a second procedure may be needed. A consent was reviewed and obtained. The patient was positioned comfortably on the exam table. After a bi-manual exam; the uterus was found to be anteverted with a size of 12-14 cm. There was no cervical motion tenderness or adnexal masses and the bladder was smooth, non-tender and without palpable masses. A sterile speculum was placed without incident. The cervix was then cleansed with betadine and stabilized with single-tooth tenaculum. It was gently dilated with the os finders. The Mirena IUD was opened and loaded into the delivery system. The wand was inserted just past the internal portio and the button was retracted to the first line. The wand was held in place for 10 seconds and then the button was retracted to its final position while the IUD was moved to the fundus, measuring >10cm. The string was trimmed in standard fashion and the single tooth tenaculum was removed. Hemostasis at the tenaculum site was noted. The speculum was then removed. The patient tolerated the procedure without difficulty. Assessment & Plan:  Deysi Rivera 36 y.o. female   Patient Active Problem List    Diagnosis Date Noted    Urge urinary incontinence 2020    Injury of right rotator cuff 2020    Fibroids 2020    Iron deficiency anemia due to chronic blood loss 2020     On iron supplementation      Abnormal uterine bleeding (AUB) 2017: hgb was 9.6, A1c 6.1, and TSH 1.37  20 ultrasound with 14cm fibroid uterus (largest 6cm fibroid)  20 EMB neg  Patient desires Mirena IUD      Obesity, Class II, BMI 35-39.9 2016    Hx of  section 2016     Discussed treatment options for recurrent BV:  1) metrogel pv one full applicator twice weekly for 6 months  2) boric acid 600mg capsules pv qhs x21 days followed by metrogel pv one full applicator twice weekly for 6 months  3) monthly flagyl 2g po with diflucan 150mg po every month for 6 months  Patient desires monthly diflucan and metronidazole. Rx sent.     Family Planning Counseling Completed  Barrier Recommendations reviewed  Removal of the Mirena IUD will be in 5 years on 2025 Annual health follow-up  2/2021    Return in about 4 weeks (around 9/2/2020) for string check. The patient was counseled on follow up and home care with restrictions noted. Post procedure restrictions were reviewed and given to the patient. She was instructed to use barrier protection for sexually transmitted disease prevention as well as string checks/timing. She was instructed to abstain for two weeks. She is to notify the office or go to the nearest Emergency Department if she experiences Abdominal Pain, Temperatures more than 101 F, Odiferous Vaginal Discharge, Dizziness or Shortness of breath. The patient was counseled on the need for string checks after her menses and coital activity. She is to notify the office if she can not locate the IUD string at anytime. She is aware that she will need a speculum exam and possibly an ultrasound to confirm the proper orientation of the IUD.         Harjinder Salas DO   [facility]  - [place]  8/5/2020 8:33 AM

## 2020-08-11 ENCOUNTER — PATIENT MESSAGE (OUTPATIENT)
Dept: OBGYN CLINIC | Age: 40
End: 2020-08-11

## 2020-08-11 RX ORDER — HYDROXYZINE 50 MG/1
50 TABLET, FILM COATED ORAL EVERY 6 HOURS PRN
Qty: 30 TABLET | Refills: 1 | Status: SHIPPED | OUTPATIENT
Start: 2020-08-11

## 2020-08-11 NOTE — TELEPHONE ENCOUNTER
From: Sarah Santos  To: Adin Salas, DO  Sent: 8/11/2020 10:22 AM EDT  Subject: Visit Follow-Up Question    Good morning! I wanted to know if you could prescribe something to help me with my anxiety? Ever since I have had the IUD put in I cannot stop being anxious, and I find myself crying more than normal. I know that that was one of the side effects and it should last for a short period of time but I do not want to feel like this every day. If it's possible could you please write me back and let me know either way. Thank you!

## 2020-09-16 ENCOUNTER — TELEPHONE (OUTPATIENT)
Dept: FAMILY MEDICINE CLINIC | Age: 40
End: 2020-09-16

## 2020-09-16 NOTE — TELEPHONE ENCOUNTER
I called patient to let her know that we have to cancel her appointment for today (VV) No answer Left detailed message.

## 2020-09-30 ENCOUNTER — OFFICE VISIT (OUTPATIENT)
Dept: FAMILY MEDICINE CLINIC | Age: 40
End: 2020-09-30
Payer: COMMERCIAL

## 2020-09-30 VITALS
WEIGHT: 228.2 LBS | HEART RATE: 80 BPM | DIASTOLIC BLOOD PRESSURE: 79 MMHG | OXYGEN SATURATION: 100 % | RESPIRATION RATE: 16 BRPM | TEMPERATURE: 97.1 F | HEIGHT: 64 IN | BODY MASS INDEX: 38.96 KG/M2 | SYSTOLIC BLOOD PRESSURE: 133 MMHG

## 2020-09-30 PROCEDURE — G8417 CALC BMI ABV UP PARAM F/U: HCPCS | Performed by: FAMILY MEDICINE

## 2020-09-30 PROCEDURE — 99213 OFFICE O/P EST LOW 20 MIN: CPT | Performed by: FAMILY MEDICINE

## 2020-09-30 PROCEDURE — 1036F TOBACCO NON-USER: CPT | Performed by: FAMILY MEDICINE

## 2020-09-30 PROCEDURE — G8427 DOCREV CUR MEDS BY ELIG CLIN: HCPCS | Performed by: FAMILY MEDICINE

## 2020-09-30 RX ORDER — CITALOPRAM 20 MG/1
20 TABLET ORAL DAILY
Qty: 30 TABLET | Refills: 11 | Status: SHIPPED | OUTPATIENT
Start: 2020-09-30

## 2020-09-30 NOTE — PROGRESS NOTES
[facility]  [address] [place] [address] [address] [facility] [place] [address]  Dept: [phone]      Chan Marcial is a 36 y.o. female who presents today for follow up on her  medical conditions as noted below. Chief Complaint   Patient presents with    Anxiety       Patient Active Problem List:     Obesity, Class II, BMI 35-39.9     Hx of  section     Abnormal uterine bleeding (AUB)     Injury of right rotator cuff     Fibroids     Iron deficiency anemia due to chronic blood loss     Urge urinary incontinence     H/O recurrent BV     Past Medical History:   Diagnosis Date    Arthritis     BMI 40.0-44.9, adult ([facility] 2016    Fibroid     Headache     Urge urinary incontinence 2020      Past Surgical History:   Procedure Laterality Date     SECTION      INTRAUTERINE DEVICE INSERTION  2020    mirena    OVARIAN CYST REMOVAL      right     Family History   Problem Relation Age of Onset    Colon Cancer Neg Hx     Breast Cancer Neg Hx     Ovarian Cancer Neg Hx     Uterine Cancer Neg Hx        Current Outpatient Medications   Medication Sig Dispense Refill    citalopram (CELEXA) 20 MG tablet Take 1 tablet by mouth daily 30 tablet 11    hydrOXYzine (ATARAX) 50 MG tablet Take 1 tablet by mouth every 6 hours as needed for Anxiety 30 tablet 1    metroNIDAZOLE (FLAGYL) 500 MG tablet Take 4 tablets by mouth every 30 days for 6 doses 24 tablet 0    fluconazole (DIFLUCAN) 150 MG tablet Take 1 tablet by mouth every 30 days for 6 doses 1 tablet 0    gabapentin (NEURONTIN) 300 MG capsule Take 300 mg by mouth daily.        tiZANidine (ZANAFLEX) 4 MG tablet Take 1 tablet by mouth every 8 hours as needed (muscle spasm) 60 tablet 3    fluticasone (FLONASE) 50 MCG/ACT nasal spray 2 sprays by Nasal route daily 1 Bottle 5    ferrous sulfate (LUL-KEE) 325 (65 Fe) MG tablet Take 2 tablets by mouth daily 180 tablet 1    ibuprofen (ADVIL;MOTRIN) and shortness of breath. Cardiovascular: Negative. Negative for chest pain and leg swelling. Gastrointestinal: Negative. Negative for abdominal pain and blood in stool. Endocrine: Negative for cold intolerance and polyuria. Genitourinary: Negative for dysuria and hematuria. Musculoskeletal: Negative. Skin: Negative for rash. Allergic/Immunologic: Negative. Neurological: Negative. Negative for dizziness, weakness and numbness. Hematological: Negative. Negative for adenopathy. Does not bruise/bleed easily. Psychiatric/Behavioral: Negative for sleep disturbance, dysphoric mood and  decreased concentration. The patient is not nervous/anxious. Objective:     Physical Exam:     Nursing note and vitals reviewed. /79   Pulse 80   Temp 97.1 °F (36.2 °C)   Resp 16   Ht 5' 4\" (1.626 m)   Wt 228 lb 3.2 oz (103.5 kg)   SpO2 100%   BMI 39.17 kg/m²   Constitutional: She is oriented to person, place, and time. She   appears well-developed and well-nourished. HENT:   Head: Normocephalic and atraumatic. Right Ear: External ear normal. Tympanic membrane is not erythematous. No middle ear effusion. Left Ear: External ear normal. Tympanic membrane is not erythematous. No middle ear effusion. Nose: No mucosal edema. Mouth/Throat: Oropharynx is clear and moist. No posterior oropharyngeal erythema. Eyes: Conjunctivae and EOM are normal. Pupils are equal, round, and reactive to light. Neck: Normal range of motion. Neck supple. No thyromegaly present. Cardiovascular: Normal rate, regular rhythm and normal heart sounds. No murmur heard. Pulmonary/Chest: Effort normal and breath sounds normal. She has no wheezes. Shehas no rales. Abdominal: Soft. Bowel sounds are normal. She exhibits no distension and no mass. There is no tenderness. There is no rebound and no guarding. Genitourinary/Anorectal:deferred  Musculoskeletal: Normal range of motion.  She exhibits no edema or tenderness. Lymphadenopathy: She has no cervical adenopathy. Neurological: She is alert and oriented to person, place, and time. She has normal reflexes. Skin: Skin is warm and dry. No rash noted. Psychiatric: She has a normal mood and affect. Her   behavior is normal.       Assessment:      1. Iron deficiency anemia due to chronic blood loss    2. Anxiety    3. Panic attack          Plan:      Call or return to clinic prn if these symptoms worsen or fail to improve as anticipated. I have reviewed the instructions with the patient, answering all questions to her satisfaction. No follow-ups on file. Orders Placed This Encounter   Procedures    CBC Auto Differential     Standing Status:   Future     Standing Expiration Date:   3/30/2021    Iron and TIBC     Standing Status:   Future     Standing Expiration Date:   10/30/2020     Order Specific Question:   Is Patient Fasting? Answer:   yes     Order Specific Question:   No of Hours?      Answer:   15    Vitamin B12 & Folate     Standing Status:   Future     Standing Expiration Date:   10/30/2020     Orders Placed This Encounter   Medications    citalopram (CELEXA) 20 MG tablet     Sig: Take 1 tablet by mouth daily     Dispense:  30 tablet     Refill:  11     I discussed with her she needs to get on some medication to help her  If she is not improving on this in the next 2 weeks she needs to notify  Electronically signed by Liberty Saul DO on 10/1/2020 at 4:48 PM

## 2020-10-07 ENCOUNTER — PATIENT MESSAGE (OUTPATIENT)
Dept: FAMILY MEDICINE CLINIC | Age: 40
End: 2020-10-07

## 2020-10-08 NOTE — TELEPHONE ENCOUNTER
From: Sara Cardona  To: Laury Tavares DO  Sent: 10/7/2020 4:06 PM EDT  Subject: Visit Follow-Up Question    Good afternoon! I want to know you can fill paperwork out for me to not to attend the job and family classes at this time because I am nervous, anxious and I cannot focus at this present time.

## 2020-10-20 ENCOUNTER — OFFICE VISIT (OUTPATIENT)
Dept: OBGYN CLINIC | Age: 40
End: 2020-10-20
Payer: MEDICAID

## 2020-10-20 VITALS
HEIGHT: 64 IN | HEART RATE: 76 BPM | DIASTOLIC BLOOD PRESSURE: 85 MMHG | BODY MASS INDEX: 38.41 KG/M2 | WEIGHT: 225 LBS | SYSTOLIC BLOOD PRESSURE: 123 MMHG

## 2020-10-20 PROCEDURE — 99213 OFFICE O/P EST LOW 20 MIN: CPT | Performed by: OBSTETRICS & GYNECOLOGY

## 2020-10-20 PROCEDURE — 1036F TOBACCO NON-USER: CPT | Performed by: OBSTETRICS & GYNECOLOGY

## 2020-10-20 PROCEDURE — G8484 FLU IMMUNIZE NO ADMIN: HCPCS | Performed by: OBSTETRICS & GYNECOLOGY

## 2020-10-20 PROCEDURE — G8427 DOCREV CUR MEDS BY ELIG CLIN: HCPCS | Performed by: OBSTETRICS & GYNECOLOGY

## 2020-10-20 PROCEDURE — G8417 CALC BMI ABV UP PARAM F/U: HCPCS | Performed by: OBSTETRICS & GYNECOLOGY

## 2020-10-20 NOTE — PROGRESS NOTES
[facility] PHYSICIANS  [facility] - [address] [facility]  Dept: [phone]  Dept Fax: [phone]    10/20/20    Chief Complaint   Patient presents with    Other     string check       Gary Reyes is a 36 y.o. Oleg Mola who presents for string check. She had a Mirena IUD placed on 8/5/20 due to AUB-L and anemia. She has had issues with increased anxiety and started celexa with her PCP a few weeks ago. She states that she feels like daily meditation has been more helpful than the medication but she is continuing it as prescribed. She has had some intermittent vaginal discharge and spotting but reports she is very happy with the IUD so far.     REVIEW OF SYSTEMS:    Constitutional: negative fever, negative chills  HEENT: negative visual disturbances, negative headaches  Respiratory: negative dyspnea, negative cough  Cardiovascular: negative chest pain,  negative palpitations  Gastrointestinal: negative Abdominal pain, negative RUQ pain, negative N/V/D, negative constipation  Genitourinary: negative dysuria, negative vaginal discharge  Dermatological: negative rash, negative wounds  Hematologic: negative bleeding/clotting disorder  Immunologic: negative recent illness, negative recent sick contact, negative allergic reactions  Lymphatic: negative lymph nodes  Musculoskeletal: negative back pain, negative myalgias, negative arthralgias  Neurological:  negative dizziness, negative weakness  Behavior/Psych: negative depression, negative anxiety    Vitals:    10/20/20 1125   BP: 123/85   Site: Right Upper Arm   Position: Sitting   Cuff Size: Large Adult   Pulse: 76   Weight: 225 lb (102.1 kg)   Height: 5' 4\" (1.626 m)       Physical exam  Gen: no acute distress, appears comfortable  Heart: regular rate and rhythm, no murmurs appreciated  Lungs: clear to auscultation bilaterally  Abd: soft, nondistended, no rebound/guarding/rigidity  Ext: no edema or calf tenderness  Pelvic: No external lesions or erythema, vaginal mucosa pink and moist, small amount of bloody discharge in the vault, cervix visually closed without lesions or erythema, IUD strings visualized      Assessment/Plan   Peder Koyanagi is a 36 y.o. [facility] with AUB-L and anemia   - Mirena IUD in place   - STD prevention and barrier precautions reviewed   - Encouraged follow up with PCP for routine health management   - RTO for annual or earlier prn    Jahaira Salas DO  [facility] [place]  10/20/2020 11:29 AM

## 2020-11-30 ENCOUNTER — OFFICE VISIT (OUTPATIENT)
Dept: OBGYN CLINIC | Age: 40
End: 2020-11-30
Payer: MEDICAID

## 2020-11-30 VITALS
DIASTOLIC BLOOD PRESSURE: 83 MMHG | WEIGHT: 226.2 LBS | HEART RATE: 83 BPM | BODY MASS INDEX: 38.83 KG/M2 | SYSTOLIC BLOOD PRESSURE: 136 MMHG

## 2020-11-30 PROCEDURE — 99213 OFFICE O/P EST LOW 20 MIN: CPT | Performed by: OBSTETRICS & GYNECOLOGY

## 2020-11-30 PROCEDURE — G8427 DOCREV CUR MEDS BY ELIG CLIN: HCPCS | Performed by: OBSTETRICS & GYNECOLOGY

## 2020-11-30 PROCEDURE — G8417 CALC BMI ABV UP PARAM F/U: HCPCS | Performed by: OBSTETRICS & GYNECOLOGY

## 2020-11-30 PROCEDURE — G8484 FLU IMMUNIZE NO ADMIN: HCPCS | Performed by: OBSTETRICS & GYNECOLOGY

## 2020-11-30 PROCEDURE — 1036F TOBACCO NON-USER: CPT | Performed by: OBSTETRICS & GYNECOLOGY

## 2020-11-30 NOTE — PROGRESS NOTES
[facility] PHYSICIANS  [facility] [employer] [employer]  [address] [facility]  Dept: [phone]  Dept Fax: [phone]    11/30/20    Chief Complaint   Patient presents with    Follow-up     urine/abdominal pressure , pt has an IUD 8/2020, leaking urine when sitting x 1 month       Emili Epperson 36 y.o. has a complaint of lower abdominal pressure and increased leakage of urine while sitting x1 month. She previously admitted to urge UI; however, now she reports UI also with cough/laugh/sneeze. She states her symptoms are not like prior BV. She has a  Mirena IUD in place due to AUB-L. She admits to eating some spicy/acidic foods, drinks alcohol occasionally, and has recently just cut out caffeine. Denies dysuria and hematuria. Has not noted any alleviating or aggravating factors. Patient's last menstrual period was 11/10/2020 (approximate).      REVIEW OF SYSTEMS:    Constitutional: negative fever, negative chills  HEENT: negative visual disturbances, negative headaches  Respiratory: negative dyspnea, negative cough  Cardiovascular: negative chest pain,  negative palpitations  Gastrointestinal: negative Abdominal pain, negative RUQ pain, negative N/V/D, negative constipation  Genitourinary: positive UI and pelvic pressure, negative dysuria, negative vaginal discharge  Dermatological: negative rash, negative wounds  Hematologic: negative bleeding/clotting disorder  Immunologic: negative recent illness, negative recent sick contact, negative allergic reactions  Lymphatic: negative lymph nodes  Musculoskeletal: negative back pain, negative myalgias, negative arthralgias  Neurological:  negative dizziness, negative weakness  Behavior/Psych: negative depression, negative anxiety    Past Medical History:   Diagnosis Date    Arthritis     BMI 40.0-44.9, adult ([facility] [address] 11/28/2016    Fibroid     Headache     Urge urinary incontinence 7/1/2020     Past Surgical History:   Procedure Laterality Date   SECTION      INTRAUTERINE DEVICE INSERTION  2020    mirena    OVARIAN CYST REMOVAL      right     Allergies   Allergen Reactions    Mobic [Meloxicam]      Abdominal pain     Current Outpatient Medications   Medication Sig Dispense Refill    citalopram (CELEXA) 20 MG tablet Take 1 tablet by mouth daily 30 tablet 11    hydrOXYzine (ATARAX) 50 MG tablet Take 1 tablet by mouth every 6 hours as needed for Anxiety 30 tablet 1    fluconazole (DIFLUCAN) 150 MG tablet Take 1 tablet by mouth every 30 days for 6 doses 1 tablet 0    gabapentin (NEURONTIN) 300 MG capsule Take 300 mg by mouth daily.        fluticasone (FLONASE) 50 MCG/ACT nasal spray 2 sprays by Nasal route daily 1 Bottle 5    ferrous sulfate (LUL-KEE) 325 (65 Fe) MG tablet Take 2 tablets by mouth daily 180 tablet 1    ibuprofen (ADVIL;MOTRIN) 600 MG tablet Take 1 tablet by mouth every 6 hours as needed for Pain 40 tablet 0    albuterol sulfate  (90 BASE) MCG/ACT inhaler Inhale 2 puffs into the lungs every 6 hours as needed for Wheezing 1 Inhaler 3    metroNIDAZOLE (FLAGYL) 500 MG tablet Take 4 tablets by mouth every 30 days for 6 doses 24 tablet 0    tiZANidine (ZANAFLEX) 4 MG tablet Take 1 tablet by mouth every 8 hours as needed (muscle spasm) 60 tablet 3     Current Facility-Administered Medications   Medication Dose Route Frequency Provider Last Rate Last Dose    levonorgestrel (MIRENA) IUD 52 mg 1 each  1 each Intrauterine Continuous See-Yin So, DO   1 each at 20 2557     Social History     Socioeconomic History    Marital status: Single     Spouse name: Not on file    Number of children: Not on file    Years of education: Not on file    Highest education level: Not on file   Occupational History    Not on file   Social Needs    Financial resource strain: Not on file    Food insecurity     Worry: Not on file     Inability: Not on file    Transportation needs     Medical: Not on file     Non-medical: Not on file   Tobacco Use    Smoking status: Never Smoker    Smokeless tobacco: Never Used   Substance and Sexual Activity    Alcohol use: Yes     Comment: social    Drug use: No    Sexual activity: Yes     Partners: Male     Birth control/protection: Condom   Lifestyle    Physical activity     Days per week: Not on file     Minutes per session: Not on file    Stress: Not on file   Relationships    Social connections     Talks on phone: Not on file     Gets together: Not on file     Attends Rastafari service: Not on file     Active member of club or organization: Not on file     Attends meetings of clubs or organizations: Not on file     Relationship status: Not on file    Intimate partner violence     Fear of current or ex partner: Not on file     Emotionally abused: Not on file     Physically abused: Not on file     Forced sexual activity: Not on file   Other Topics Concern    Not on file   Social History Narrative    Not on file     Family History   Problem Relation Age of Onset    Colon Cancer Neg Hx     Breast Cancer Neg Hx     Ovarian Cancer Neg Hx     Uterine Cancer Neg Hx        Vitals:    11/30/20 1002   BP: 136/83   Site: Left Upper Arm   Position: Sitting   Cuff Size: Large Adult   Pulse: 83   Weight: 226 lb 3.2 oz (102.6 kg)       Physical exam  Gen: no acute distress, appears comfortable  Heart: regular rate and rhythm, no murmurs appreciated  Lungs: clear to auscultation bilaterally  Abd: soft, obese, nontender, nondistended, no rebound/guarding/rigidity  Ext: no edema or calf tenderness  Pelvic: stage 2 anterior prolapse with stage 1 apical and posterior prolapse, no external lesions or erythema, vaginal mucosa pink and moist, no blood or discharge in the vault, cervix visually closed without lesions or erythema, IUD strings visualized    Assessment/Plan   Robert Albertoluann is a 36 y.o. Amaury Demark with mixed UI, pelvic organ prolapse and known fibroid uterus/AUB-L   - reviewed recommendation for

## 2021-01-26 ENCOUNTER — HOSPITAL ENCOUNTER (OUTPATIENT)
Age: 41
Setting detail: SPECIMEN
Discharge: HOME | End: 2021-01-26
Payer: MEDICAID

## 2021-01-26 ENCOUNTER — OFFICE VISIT (OUTPATIENT)
Dept: OBGYN CLINIC | Age: 41
End: 2021-01-26
Payer: MEDICAID

## 2021-01-26 VITALS
SYSTOLIC BLOOD PRESSURE: 127 MMHG | WEIGHT: 226 LBS | HEIGHT: 64 IN | HEART RATE: 75 BPM | DIASTOLIC BLOOD PRESSURE: 82 MMHG | BODY MASS INDEX: 38.58 KG/M2

## 2021-01-26 DIAGNOSIS — N89.8 VAGINAL IRRITATION: Primary | ICD-10-CM

## 2021-01-26 DIAGNOSIS — D50.0 IRON DEFICIENCY ANEMIA DUE TO CHRONIC BLOOD LOSS: ICD-10-CM

## 2021-01-26 DIAGNOSIS — R35.0 FREQUENT URINATION: ICD-10-CM

## 2021-01-26 DIAGNOSIS — Z20.2 POSSIBLE EXPOSURE TO STD: ICD-10-CM

## 2021-01-26 DIAGNOSIS — D21.9 FIBROIDS: ICD-10-CM

## 2021-01-26 DIAGNOSIS — Z97.5 IUD (INTRAUTERINE DEVICE) IN PLACE: ICD-10-CM

## 2021-01-26 DIAGNOSIS — N93.9 ABNORMAL UTERINE BLEEDING (AUB): ICD-10-CM

## 2021-01-26 LAB
ABSOLUTE EOS #: 0.17 K/UL (ref 0–0.44)
ABSOLUTE IMMATURE GRANULOCYTE: <0.03 K/UL (ref 0–0.3)
ABSOLUTE LYMPH #: 2.26 K/UL (ref 1.1–3.7)
ABSOLUTE MONO #: 0.4 K/UL (ref 0.1–1.2)
BASOPHILS # BLD: 1 % (ref 0–2)
BASOPHILS ABSOLUTE: 0.04 K/UL (ref 0–0.2)
BILIRUBIN URINE: NEGATIVE
COLOR: YELLOW
COMMENT UA: NORMAL
DIFFERENTIAL TYPE: ABNORMAL
EOSINOPHILS RELATIVE PERCENT: 3 % (ref 1–4)
FOLATE: 14.1 NG/ML
GLUCOSE URINE: NEGATIVE
HAV IGM SER IA-ACNC: NONREACTIVE
HCT VFR BLD CALC: 36.3 % (ref 36.3–47.1)
HEMOGLOBIN: 11.6 G/DL (ref 11.9–15.1)
HEPATITIS B CORE IGM ANTIBODY: NONREACTIVE
HEPATITIS B SURFACE ANTIGEN: NONREACTIVE
HEPATITIS C ANTIBODY: NONREACTIVE
HIV AG/AB: NONREACTIVE
IMMATURE GRANULOCYTES: 0 %
IRON SATURATION: 18 % (ref 20–55)
IRON: 63 UG/DL (ref 37–145)
KETONES, URINE: NEGATIVE
LEUKOCYTE ESTERASE, URINE: NEGATIVE
LYMPHOCYTES # BLD: 34 % (ref 24–43)
MCH RBC QN AUTO: 29.6 PG (ref 25.2–33.5)
MCHC RBC AUTO-ENTMCNC: 32 G/DL (ref 28.4–34.8)
MCV RBC AUTO: 92.6 FL (ref 82.6–102.9)
MONOCYTES # BLD: 6 % (ref 3–12)
NITRITE, URINE: NEGATIVE
NRBC AUTOMATED: 0 PER 100 WBC
PDW BLD-RTO: 15.4 % (ref 11.8–14.4)
PH UA: 5.5 (ref 5–8)
PLATELET # BLD: 366 K/UL (ref 138–453)
PLATELET ESTIMATE: ABNORMAL
PMV BLD AUTO: 9.8 FL (ref 8.1–13.5)
PROTEIN UA: NEGATIVE
RBC # BLD: 3.92 M/UL (ref 3.95–5.11)
RBC # BLD: ABNORMAL 10*6/UL
SEG NEUTROPHILS: 56 % (ref 36–65)
SEGMENTED NEUTROPHILS ABSOLUTE COUNT: 3.77 K/UL (ref 1.5–8.1)
SPECIFIC GRAVITY UA: 1.03 (ref 1–1.03)
T. PALLIDUM, IGG: NONREACTIVE
TOTAL IRON BINDING CAPACITY: 356 UG/DL (ref 250–450)
TURBIDITY: CLEAR
UNSATURATED IRON BINDING CAPACITY: 293 UG/DL (ref 112–347)
URINE HGB: NEGATIVE
UROBILINOGEN, URINE: NORMAL
VITAMIN B-12: 286 PG/ML (ref 232–1245)
WBC # BLD: 6.7 K/UL (ref 3.5–11.3)
WBC # BLD: ABNORMAL 10*3/UL

## 2021-01-26 PROCEDURE — 99213 OFFICE O/P EST LOW 20 MIN: CPT | Performed by: OBSTETRICS & GYNECOLOGY

## 2021-01-26 PROCEDURE — G8427 DOCREV CUR MEDS BY ELIG CLIN: HCPCS | Performed by: OBSTETRICS & GYNECOLOGY

## 2021-01-26 PROCEDURE — 1036F TOBACCO NON-USER: CPT | Performed by: OBSTETRICS & GYNECOLOGY

## 2021-01-26 PROCEDURE — G8484 FLU IMMUNIZE NO ADMIN: HCPCS | Performed by: OBSTETRICS & GYNECOLOGY

## 2021-01-26 PROCEDURE — G8417 CALC BMI ABV UP PARAM F/U: HCPCS | Performed by: OBSTETRICS & GYNECOLOGY

## 2021-01-26 NOTE — PROGRESS NOTES
[facility] PHYSICIANS  [facility] - [address] [facility]  Dept: [phone]  Dept Fax: [phone]    01/26/21    Chief Complaint   Patient presents with    Follow-up       Antonia Gamboa 36 y.o. has a complaint of vulvovaginal irritation and would like STD testing. Patient's last menstrual period was 01/07/2021. She admits to possible STD exposure. She is unsure if the irritation is from her new medication or if it is due to wearing a pad as she had intermittent spotting and bleeding throughout this past month. She overall is happier with the Mirena IUD and reports she is not ready to commit to a hysterectomy. However, she would like to know if there are any other options. Reports that she saw urology and was started on myrbetriq. She thinks it is helping with her urinary symptoms.       REVIEW OF SYSTEMS:    Constitutional: negative fever, negative chills  HEENT: negative visual disturbances, negative headaches  Respiratory: negative dyspnea, negative cough  Cardiovascular: negative chest pain,  negative palpitations  Gastrointestinal: negative Abdominal pain, negative RUQ pain, negative N/V/D, negative constipation  Genitourinary: positive vulvovaginal irritation and AUB with known fibroids and IUD in place, negative dysuria, negative vaginal discharge  Dermatological: negative rash, negative wounds  Hematologic: negative bleeding/clotting disorder  Immunologic: negative recent illness, negative recent sick contact, negative allergic reactions  Lymphatic: negative lymph nodes  Musculoskeletal: negative back pain, negative myalgias, negative arthralgias  Neurological:  negative dizziness, negative weakness  Behavior/Psych: negative depression, negative anxiety    Past Medical History:   Diagnosis Date    Arthritis     BMI 40.0-44.9, adult ([facility] [address] 11/28/2016    Fibroid     Headache     Urge urinary incontinence 7/1/2020     Past Surgical History: Procedure Laterality Date     SECTION      INTRAUTERINE DEVICE INSERTION  2020    mirena    OVARIAN CYST REMOVAL      right     Allergies   Allergen Reactions    Mobic [Meloxicam]      Abdominal pain     Current Outpatient Medications   Medication Sig Dispense Refill    mirabegron (MYRBETRIQ) 50 MG TB24 Take 50 mg by mouth daily      citalopram (CELEXA) 20 MG tablet Take 1 tablet by mouth daily 30 tablet 11    hydrOXYzine (ATARAX) 50 MG tablet Take 1 tablet by mouth every 6 hours as needed for Anxiety 30 tablet 1    gabapentin (NEURONTIN) 300 MG capsule Take 300 mg by mouth daily.        tiZANidine (ZANAFLEX) 4 MG tablet Take 1 tablet by mouth every 8 hours as needed (muscle spasm) 60 tablet 3    fluticasone (FLONASE) 50 MCG/ACT nasal spray 2 sprays by Nasal route daily 1 Bottle 5    ferrous sulfate (LUL-KEE) 325 (65 Fe) MG tablet Take 2 tablets by mouth daily 180 tablet 1    ibuprofen (ADVIL;MOTRIN) 600 MG tablet Take 1 tablet by mouth every 6 hours as needed for Pain 40 tablet 0    albuterol sulfate  (90 BASE) MCG/ACT inhaler Inhale 2 puffs into the lungs every 6 hours as needed for Wheezing 1 Inhaler 3     Current Facility-Administered Medications   Medication Dose Route Frequency Provider Last Rate Last Admin    levonorgestrel (MIRENA) IUD 52 mg 1 each  1 each Intrauterine Continuous See-Yin So, DO   1 each at 20 1590     Social History     Socioeconomic History    Marital status: Single     Spouse name: Not on file    Number of children: Not on file    Years of education: Not on file    Highest education level: Not on file   Occupational History    Not on file   Social Needs    Financial resource strain: Not on file    Food insecurity     Worry: Not on file     Inability: Not on file    Transportation needs     Medical: Not on file     Non-medical: Not on file   Tobacco Use    Smoking status: Never Smoker    Smokeless tobacco: Never Used Substance and Sexual Activity    Alcohol use: Yes     Comment: social    Drug use: No    Sexual activity: Yes     Partners: Male     Birth control/protection: Condom   Lifestyle    Physical activity     Days per week: Not on file     Minutes per session: Not on file    Stress: Not on file   Relationships    Social connections     Talks on phone: Not on file     Gets together: Not on file     Attends Yazidi service: Not on file     Active member of club or organization: Not on file     Attends meetings of clubs or organizations: Not on file     Relationship status: Not on file    Intimate partner violence     Fear of current or ex partner: Not on file     Emotionally abused: Not on file     Physically abused: Not on file     Forced sexual activity: Not on file   Other Topics Concern    Not on file   Social History Narrative    Not on file     Family History   Problem Relation Age of Onset    Colon Cancer Neg Hx     Breast Cancer Neg Hx     Ovarian Cancer Neg Hx     Uterine Cancer Neg Hx        Vitals:    01/26/21 1507   BP: 127/82   Site: Left Lower Arm   Position: Sitting   Cuff Size: Large Adult   Pulse: 75   Weight: 226 lb (102.5 kg)   Height: 5' 4\" (1.626 m)       Physical exam  Gen: no acute distress, appears comfortable  Heart: regular rate and rhythm, no murmurs appreciated  Lungs: clear to auscultation bilaterally  Abd: soft, nontender, nondistended, no rebound/guarding/rigidity  Ext: no edema or calf tenderness  Pelvic: No external lesions or erythema, vaginal mucosa pink and moist, small amount of bloody discharge in the vault, cervix visually closed without lesions or erythema, IUD strings visualized      Assessment/Plan   Eloina Mcallister is a 36 y.o. Avril Miguel with vulvovaginal irritation, possible STD exposure, and AUB-L with Mirena IUD in place   - Reassurance given as exam was benign today. GC/C and vaginitis swabs collected and sent. HIV, hepatitis and T pall ordered. - Normal genital hygiene discussed. STD prevention and barrier recommendations reviewed. - Reviewed management options for AUB-L and discussed that spotting/bleeding could be normal due to Mirena IUD placement as it continues to thin the endometrial lining. Patient vocalized understanding and is thinking she would like the ACESSA laparoscopic radiofrequency ablation.  Will refer to Dr. Glenny Fontaine for further consultation.   - RTO for annual or earlier prn    See-Yael Salas, DO  [address]

## 2021-01-27 ENCOUNTER — PATIENT MESSAGE (OUTPATIENT)
Dept: FAMILY MEDICINE CLINIC | Age: 41
End: 2021-01-27

## 2021-01-27 DIAGNOSIS — N89.8 VAGINAL IRRITATION: Primary | ICD-10-CM

## 2021-01-27 DIAGNOSIS — N89.8 VAGINAL IRRITATION: ICD-10-CM

## 2021-01-27 DIAGNOSIS — Z20.2 POSSIBLE EXPOSURE TO STD: ICD-10-CM

## 2021-01-27 DIAGNOSIS — N76.0 BACTERIAL VAGINOSIS: ICD-10-CM

## 2021-01-27 DIAGNOSIS — B96.89 BACTERIAL VAGINOSIS: ICD-10-CM

## 2021-01-27 LAB
C TRACH DNA GENITAL QL NAA+PROBE: NEGATIVE
DIRECT EXAM: ABNORMAL
Lab: ABNORMAL
N. GONORRHOEAE DNA: NEGATIVE
SPECIMEN DESCRIPTION: ABNORMAL
SPECIMEN DESCRIPTION: NORMAL

## 2021-01-27 RX ORDER — METRONIDAZOLE 7.5 MG/G
1 GEL VAGINAL DAILY
Qty: 1 TUBE | Refills: 0 | Status: SHIPPED | OUTPATIENT
Start: 2021-01-27 | End: 2021-02-01

## 2021-01-27 RX ORDER — FLUCONAZOLE 150 MG/1
150 TABLET ORAL ONCE
Qty: 1 TABLET | Refills: 0 | Status: SHIPPED | OUTPATIENT
Start: 2021-01-27 | End: 2021-01-27

## 2021-01-27 RX ORDER — METRONIDAZOLE 500 MG/1
500 TABLET ORAL 2 TIMES DAILY
Qty: 14 TABLET | Refills: 0 | Status: SHIPPED | OUTPATIENT
Start: 2021-01-27 | End: 2021-02-03

## 2021-01-27 NOTE — TELEPHONE ENCOUNTER
From: Shreyas Peoples  To: Daren Uriostegui DO  Sent: 1/27/2021 9:31 AM EST  Subject: Test Results Question    I have a question about CBC W/ AUTO DIFFERENTIAL resulted on 1/26/21 at 8:18 PM. Can you explain all of my test results to me please?

## 2021-02-04 ENCOUNTER — OFFICE VISIT (OUTPATIENT)
Dept: OBGYN CLINIC | Age: 41
End: 2021-02-04
Payer: MEDICAID

## 2021-02-04 VITALS
SYSTOLIC BLOOD PRESSURE: 118 MMHG | WEIGHT: 226 LBS | DIASTOLIC BLOOD PRESSURE: 72 MMHG | HEIGHT: 64 IN | BODY MASS INDEX: 38.58 KG/M2 | TEMPERATURE: 97 F

## 2021-02-04 DIAGNOSIS — N93.9 ABNORMAL UTERINE BLEEDING (AUB): Primary | ICD-10-CM

## 2021-02-04 DIAGNOSIS — Z98.891 HX OF CESAREAN SECTION: ICD-10-CM

## 2021-02-04 DIAGNOSIS — D21.9 FIBROIDS: ICD-10-CM

## 2021-02-04 DIAGNOSIS — D50.0 IRON DEFICIENCY ANEMIA DUE TO CHRONIC BLOOD LOSS: ICD-10-CM

## 2021-02-04 DIAGNOSIS — E66.9 OBESITY, CLASS II, BMI 35-39.9: ICD-10-CM

## 2021-02-04 PROCEDURE — G8417 CALC BMI ABV UP PARAM F/U: HCPCS | Performed by: OBSTETRICS & GYNECOLOGY

## 2021-02-04 PROCEDURE — G8484 FLU IMMUNIZE NO ADMIN: HCPCS | Performed by: OBSTETRICS & GYNECOLOGY

## 2021-02-04 PROCEDURE — 99214 OFFICE O/P EST MOD 30 MIN: CPT | Performed by: OBSTETRICS & GYNECOLOGY

## 2021-02-04 PROCEDURE — G8428 CUR MEDS NOT DOCUMENT: HCPCS | Performed by: OBSTETRICS & GYNECOLOGY

## 2021-02-04 PROCEDURE — 1036F TOBACCO NON-USER: CPT | Performed by: OBSTETRICS & GYNECOLOGY

## 2021-02-04 NOTE — PATIENT INSTRUCTIONS
Please read the information given to you on the Acessa procedure. You may also want to visit the  acessa website for more information. If you have questions after reviewing the information, you can schedule an appointment in the office, a virtual visit or send your questions through [address] If you do desire to have the surgery, please schedule an office visit to be consented and schedule the surgery.

## 2021-02-05 NOTE — PROGRESS NOTES
DATE OF VISIT:  21        History and Physical    Rigoberto Cabral    :  1980  CHIEF COMPLAINT:    Chief Complaint   Patient presents with    Pre-op Exam     Here for consult for [facility] 21 and has been stoppping and starting                     HPI :   Rigoberto Cabral is a 36 y.o. femaleGRAVIDA 1 PARA 1001    Rigoberto Cabral returns today and is here with her mother present to discuss GYN surgery. She is a patient who is followed by Dr. Gabriel Knapp and had previously discussed surgical options for treatment of her symptomatic uterine fibroids. She does have a history of anemia and experiences urinary frequency. Ultrasound confirms multiple fibroids. She is here to discuss scheduling a radiofrequency ablation of uterine fibroids secondary to    Diagnosis Orders   1. Abnormal uterine bleeding (AUB)     2. Fibroids     3. Iron deficiency anemia due to chronic blood loss     4. Hx of  section     5.  Obesity, Class II, BMI 35-39.9       EXAMINATION:   PELVIC ULTRASOUND       2020       TECHNIQUE:   Transabdominal and transvaginal pelvic ultrasound was performed.       COMPARISON:   Pelvic ultrasound 2017.       HISTORY:   ORDERING SYSTEM PROVIDED HISTORY: Fibroids       FINDINGS:       Measurements:       Uterus:  14.1 x 8.5 x 10.2 cm       Endometrial stripe:  2.6 cm       Right Ovary:  2.9 x 2.1 x 2.3 cm       Left Ovary:  2.8 x 2.2 x 3.1 cm.           Ultrasound Findings:       Uterus: Uterus is enlarged and heterogeneous in echotexture with 2 measurable   fibroids, largest measuring 6.0 x 3.8 x 3.6 cm.  Largest fibroid measured on   the prior study measured 4.4 x 4.4 x 3.1 cm.       Endometrial stripe: Endometrial stripe is within normal limits.       Right Ovary: Right ovary is within normal limits.       Left Ovary:  Left ovary is within normal limits.       Free Fluid: No evidence of free fluid.           Impression 1. Fibroid uterus, largest fibroid now measures 6.0 x 3.8 x 3.6 cm.  Findings   appear to have progressed since the 2017 study. 2. Normal ovaries and endometrium. _____________________________________________________________________  Past Medical History:   Diagnosis Date    Arthritis     BMI 40.0-44.9, adult ([facility] [address] 2016    Fibroid     Headache     Urge urinary incontinence 2020                                                                   Past Surgical History:   Procedure Laterality Date     SECTION      INTRAUTERINE DEVICE INSERTION  2020    mirena    OVARIAN CYST REMOVAL      right     Family History   Problem Relation Age of Onset    Colon Cancer Neg Hx     Breast Cancer Neg Hx     Ovarian Cancer Neg Hx     Uterine Cancer Neg Hx      Social History     Tobacco Use   Smoking Status Never Smoker   Smokeless Tobacco Never Used     Social History     Substance and Sexual Activity   Alcohol Use Yes    Comment: social     Current Outpatient Medications   Medication Sig Dispense Refill    mirabegron (MYRBETRIQ) 50 MG TB24 Take 50 mg by mouth daily      citalopram (CELEXA) 20 MG tablet Take 1 tablet by mouth daily 30 tablet 11    hydrOXYzine (ATARAX) 50 MG tablet Take 1 tablet by mouth every 6 hours as needed for Anxiety 30 tablet 1    gabapentin (NEURONTIN) 300 MG capsule Take 300 mg by mouth daily.        tiZANidine (ZANAFLEX) 4 MG tablet Take 1 tablet by mouth every 8 hours as needed (muscle spasm) 60 tablet 3    fluticasone (FLONASE) 50 MCG/ACT nasal spray 2 sprays by Nasal route daily 1 Bottle 5    ferrous sulfate (LUL-KEE) 325 (65 Fe) MG tablet Take 2 tablets by mouth daily 180 tablet 1    ibuprofen (ADVIL;MOTRIN) 600 MG tablet Take 1 tablet by mouth every 6 hours as needed for Pain 40 tablet 0    albuterol sulfate  (90 BASE) MCG/ACT inhaler Inhale 2 puffs into the lungs every 6 hours as needed for Wheezing 1 Inhaler 3 Uterus is large and bulky with a low anterior fibroid. Size difficult to ascertain due to patient's BMI. Without CMT and adnexa nontender and without abnormal masses bilaterally. Extremities:  FROM and nontender without clubbing cyanosis or edema. ASSESSMENT:         ICD-10-CM    1. Abnormal uterine bleeding (AUB)  N93.9    2. Fibroids  D21.9    3. Iron deficiency anemia due to chronic blood loss  D50.0    4. Hx of  section  Z98.891    5. Obesity, Class II, BMI 35-39.9  E66.9                    PLAN:  Ultrasound results were reviewed with the patient. An extensive and thorough discussion was done regarding radiofrequency ablation including benefits and risks. Discussed possible need for additional surgeries and particularly with a history of previous abdominal/pelvic surgeries. She was not very familiar with the procedure and patient information was not available at her last visit with Dr. Luis Manuel Post. She was given this information and website reference. She will review this information and call the office when she has made a decision. Dara Hall does have an appointment coming up with urology for E/M of urinary incontinence. Victoriano Kong MD, MPH, ROWAN Garcia. [facility]  [place]

## 2021-02-08 ENCOUNTER — TELEPHONE (OUTPATIENT)
Dept: OBGYN CLINIC | Age: 41
End: 2021-02-08

## 2021-02-17 ENCOUNTER — TELEPHONE (OUTPATIENT)
Dept: OBGYN CLINIC | Age: 41
End: 2021-02-17

## 2021-02-26 ENCOUNTER — PROCEDURE VISIT (OUTPATIENT)
Dept: OBGYN CLINIC | Age: 41
End: 2021-02-26
Payer: MEDICAID

## 2021-02-26 VITALS
DIASTOLIC BLOOD PRESSURE: 86 MMHG | SYSTOLIC BLOOD PRESSURE: 142 MMHG | TEMPERATURE: 98.2 F | BODY MASS INDEX: 40.03 KG/M2 | HEART RATE: 86 BPM | WEIGHT: 233.2 LBS

## 2021-02-26 DIAGNOSIS — D50.0 IRON DEFICIENCY ANEMIA DUE TO CHRONIC BLOOD LOSS: ICD-10-CM

## 2021-02-26 DIAGNOSIS — Z97.5 IUD (INTRAUTERINE DEVICE) IN PLACE: ICD-10-CM

## 2021-02-26 DIAGNOSIS — N93.9 ABNORMAL UTERINE BLEEDING (AUB): Primary | ICD-10-CM

## 2021-02-26 DIAGNOSIS — N39.41 URGE URINARY INCONTINENCE: ICD-10-CM

## 2021-02-26 DIAGNOSIS — R03.0 ELEVATED BLOOD PRESSURE READING WITHOUT DIAGNOSIS OF HYPERTENSION: ICD-10-CM

## 2021-02-26 DIAGNOSIS — Z98.891 HX OF CESAREAN SECTION: ICD-10-CM

## 2021-02-26 DIAGNOSIS — D21.9 FIBROIDS: ICD-10-CM

## 2021-02-26 PROCEDURE — G8484 FLU IMMUNIZE NO ADMIN: HCPCS | Performed by: OBSTETRICS & GYNECOLOGY

## 2021-02-26 PROCEDURE — G8427 DOCREV CUR MEDS BY ELIG CLIN: HCPCS | Performed by: OBSTETRICS & GYNECOLOGY

## 2021-02-26 PROCEDURE — G8417 CALC BMI ABV UP PARAM F/U: HCPCS | Performed by: OBSTETRICS & GYNECOLOGY

## 2021-02-26 PROCEDURE — 1036F TOBACCO NON-USER: CPT | Performed by: OBSTETRICS & GYNECOLOGY

## 2021-02-26 PROCEDURE — 99214 OFFICE O/P EST MOD 30 MIN: CPT | Performed by: OBSTETRICS & GYNECOLOGY

## 2021-02-26 NOTE — PROGRESS NOTES
[facility] PHYSICIANS  [facility] - [address]  Dept: [phone]  Dept Fax: [phone]    21    Chief Complaint   Patient presents with   Silvia Kiera is a 39 y.o. Ancelmo Clark who presents for follow up of AUB-L with Mirena IUD in place. She states that after more thought, she wants to move forward with a hysterectomy. Her periods are \"better\" with the IUD but she feels like she's putting off the inevitable. She thought about the ACESSA but figured if she is going to have surgery, she might as well move forward with something definitive. Patient's last menstrual period was 2021 (approximate). Past Medical History:   Diagnosis Date    Arthritis     BMI 40.0-44.9, adult ([facility] [address] 2016    Fibroid     Headache     Urge urinary incontinence 2020     Past Surgical History:   Procedure Laterality Date     SECTION      INTRAUTERINE DEVICE INSERTION  2020    mirena    OVARIAN CYST REMOVAL      right     Allergies   Allergen Reactions    Mobic [Meloxicam]      Abdominal pain     Current Outpatient Medications   Medication Sig Dispense Refill    mirabegron (MYRBETRIQ) 50 MG TB24 Take 50 mg by mouth daily      citalopram (CELEXA) 20 MG tablet Take 1 tablet by mouth daily 30 tablet 11    hydrOXYzine (ATARAX) 50 MG tablet Take 1 tablet by mouth every 6 hours as needed for Anxiety 30 tablet 1    gabapentin (NEURONTIN) 300 MG capsule Take 300 mg by mouth daily.        tiZANidine (ZANAFLEX) 4 MG tablet Take 1 tablet by mouth every 8 hours as needed (muscle spasm) 60 tablet 3    fluticasone (FLONASE) 50 MCG/ACT nasal spray 2 sprays by Nasal route daily 1 Bottle 5    ferrous sulfate (LUL-KEE) 325 (65 Fe) MG tablet Take 2 tablets by mouth daily 180 tablet 1    ibuprofen (ADVIL;MOTRIN) 600 MG tablet Take 1 tablet by mouth every 6 hours as needed for Pain 40 tablet 0  albuterol sulfate  (90 BASE) MCG/ACT inhaler Inhale 2 puffs into the lungs every 6 hours as needed for Wheezing 1 Inhaler 3     Current Facility-Administered Medications   Medication Dose Route Frequency Provider Last Rate Last Admin    levonorgestrel (MIRENA) IUD 52 mg 1 each  1 each Intrauterine Continuous See-Yael So, DO   1 each at 08/05/20 0852     Social History     Socioeconomic History    Marital status: Single     Spouse name: Not on file    Number of children: Not on file    Years of education: Not on file    Highest education level: Not on file   Occupational History    Not on file   Social Needs    Financial resource strain: Not on file    Food insecurity     Worry: Not on file     Inability: Not on file   [employer] needs     Medical: Not on file     Non-medical: Not on file   Tobacco Use    Smoking status: Never Smoker    Smokeless tobacco: Never Used   Substance and Sexual Activity    Alcohol use: Yes     Comment: social    Drug use: No    Sexual activity: Yes     Partners: Male     Birth control/protection: Condom   Lifestyle    Physical activity     Days per week: Not on file     Minutes per session: Not on file    Stress: Not on file   Relationships    Social connections     Talks on phone: Not on file     Gets together: Not on file     Attends Restorationism service: Not on file     Active member of club or organization: Not on file     Attends meetings of clubs or organizations: Not on file     Relationship status: Not on file    Intimate partner violence     Fear of current or ex partner: Not on file     Emotionally abused: Not on file     Physically abused: Not on file     Forced sexual activity: Not on file   Other Topics Concern    Not on file   Social History Narrative    Not on file     Family History   Problem Relation Age of Onset    Colon Cancer Neg Hx     Breast Cancer Neg Hx     Ovarian Cancer Neg Hx     Uterine Cancer Neg Hx        Vitals: 21 1224   BP: (!) 142/86   Site: Left Lower Arm   Position: Sitting   Cuff Size: Small Adult   Pulse: 86   Temp: 98.2 °F (36.8 °C)   Weight: 233 lb 3.2 oz (105.8 kg)       Physical exam  Gen: no acute distress, appears comfortable  Heart: regular rate and rhythm, no murmurs appreciated  Lungs: clear to auscultation bilaterally  Abd: soft, nontender, nondistended, no rebound/guarding/rigidity  Ext: no edema or calf tenderness      Assessment/Plan   Florencia Mclean is a 39 y.o.  with AUB-L, enlarged fibroid uterus, iron deficiency anemia, h/o , h/o ovarian cyst removal, and Mirena IUD in place   - Reviewed medical and surgical management options for AUB-L, including side effect profiles/dosing regimens and surgical risks/benefits/alternatives. All questions answered. Patient desires to proceed with definitive management via hysterectomy. EMB was negative 2020.   - Discussed with patient R/B/A to hysterectomy. Patient is a candidate for Total Laparoscopic Hysterectomy. Discussed with patient risks of procedure and complications including but not limited to: injury to surrounding structures (bowel, bladder, arteries, nerves, veins), infection, bleeding need for further surgery, post-op complications including DVT, PE, atelectasis, anesthesia complications, and death. Need for blood products reviewed and patient is agreeable to receiving blood if needed. Discussed need for 6 weeks off of work and light activity. Discussed nothing in the vagina and no intercourse for 8 weeks. Patient understands plan for discharge home day of surgery if tolerating PO, urinating, and pain is controlled. Also explained risk of converting from laparoscopic to open if needed, especially due to enlarged 14cm fibroid uterus and h/o multiple abdominal surgeries. Discussed increased recovery time and hospital stay. Patient states understanding and is agreeable. Ovarian preservation versus removal discussed with patient at length. Patient is requesting preservation of bilateral ovaries at time of hysterectomy. Discussed 10% risk of reoperation at a later time to ovarian pathology. Discussed possible need for removal if abnormal appearance or bleeding occurs. She states understanding. Reviewed preoperative COVID testing. All questions answered and written consent obtained. Will schedule at [facility]. Recommended clearance from PCP. Elevated BP today without dx of HTN.     See-Yael So, DO  [address]

## 2021-03-04 ENCOUNTER — TELEPHONE (OUTPATIENT)
Dept: OBGYN CLINIC | Age: 41
End: 2021-03-04

## 2021-03-04 NOTE — TELEPHONE ENCOUNTER
lvm to let pt know her surgery is scheduled for 4/15/2021 at 7:30am at [facility] needs to arrive at 6:00am. Also covid test will be 4/11/2021 10:20am at [facility].  please schedule pt for her 2 wk and 6 wk post-op

## 2021-04-05 ENCOUNTER — OFFICE VISIT (OUTPATIENT)
Dept: FAMILY MEDICINE CLINIC | Age: 41
End: 2021-04-05
Payer: MEDICAID

## 2021-04-05 VITALS
DIASTOLIC BLOOD PRESSURE: 86 MMHG | BODY MASS INDEX: 40.26 KG/M2 | TEMPERATURE: 97.3 F | HEIGHT: 64 IN | HEART RATE: 99 BPM | SYSTOLIC BLOOD PRESSURE: 135 MMHG | WEIGHT: 235.8 LBS | OXYGEN SATURATION: 97 %

## 2021-04-05 DIAGNOSIS — F41.0 PANIC ATTACK: ICD-10-CM

## 2021-04-05 DIAGNOSIS — F41.9 ANXIETY: ICD-10-CM

## 2021-04-05 DIAGNOSIS — Z01.818 PRE-OPERATIVE CLEARANCE: Primary | ICD-10-CM

## 2021-04-05 PROCEDURE — 1036F TOBACCO NON-USER: CPT | Performed by: FAMILY MEDICINE

## 2021-04-05 PROCEDURE — G8417 CALC BMI ABV UP PARAM F/U: HCPCS | Performed by: FAMILY MEDICINE

## 2021-04-05 PROCEDURE — G8427 DOCREV CUR MEDS BY ELIG CLIN: HCPCS | Performed by: FAMILY MEDICINE

## 2021-04-05 PROCEDURE — 99213 OFFICE O/P EST LOW 20 MIN: CPT | Performed by: FAMILY MEDICINE

## 2021-04-05 RX ORDER — BUPROPION HYDROCHLORIDE 300 MG/1
300 TABLET ORAL EVERY MORNING
Qty: 30 TABLET | Refills: 11 | Status: SHIPPED | OUTPATIENT
Start: 2021-04-05

## 2021-04-05 ASSESSMENT — PATIENT HEALTH QUESTIONNAIRE - PHQ9
SUM OF ALL RESPONSES TO PHQ QUESTIONS 1-9: 0
SUM OF ALL RESPONSES TO PHQ9 QUESTIONS 1 & 2: 0
2. FEELING DOWN, DEPRESSED OR HOPELESS: 0

## 2021-04-05 NOTE — PROGRESS NOTES
swelling. Gastrointestinal: Negative. Negative for abdominal pain and blood in stool. Endocrine: Negative for cold intolerance and polyuria. Genitourinary: Negative for dysuria and hematuria. Musculoskeletal: Negative. Skin: Negative for rash. Allergic/Immunologic: Negative. Neurological: Negative. Negative for dizziness, weakness and numbness. Hematological: Negative. Negative for adenopathy. Does not bruise/bleed easily. Psychiatric/Behavioral: Negative for sleep disturbance, dysphoric mood and  decreased concentration. The patient is not nervous/anxious. Objective:     Physical Exam:     Nursing note and vitals reviewed. /86   Pulse 99   Temp 97.3 °F (36.3 °C)   Ht 5' 4\" (1.626 m)   Wt 235 lb 12.8 oz (107 kg)   SpO2 97%   BMI 40.47 kg/m²   Constitutional: She is oriented to person, place, and time. She   appears well-developed and well-nourished. HENT:   Head: Normocephalic and atraumatic. Right Ear: External ear normal. Tympanic membrane is not erythematous. No middle ear effusion. Left Ear: External ear normal. Tympanic membrane is not erythematous. No middle ear effusion. Nose: No mucosal edema. Mouth/Throat: Oropharynx is clear and moist. No posterior oropharyngeal erythema. Eyes: Conjunctivae and EOM are normal. Pupils are equal, round, and reactive to light. Neck: Normal range of motion. Neck supple. No thyromegaly present. Cardiovascular: Normal rate, regular rhythm and normal heart sounds. No murmur heard. Pulmonary/Chest: Effort normal and breath sounds normal. She has no wheezes. Shehas no rales. Abdominal: Soft. Bowel sounds are normal. She exhibits no distension and no mass. There is no tenderness. There is no rebound and no guarding. Genitourinary/Anorectal:deferred  Musculoskeletal: Normal range of motion. She exhibits no edema or tenderness. Lymphadenopathy: She has no cervical adenopathy.    Neurological: She is alert and oriented to person, place, and time. She has normal reflexes. Skin: Skin is warm and dry. No rash noted. Psychiatric: She has a normal mood and affect. Her   behavior is normal.       Assessment:      1. Pre-operative clearance    2. Anxiety    3. Panic attack          Plan:      Call or return to clinic prn if these symptoms worsen or fail to improve as anticipated. I have reviewed the instructions with the patient, answering all questions to her satisfaction. No follow-ups on file.   Orders Placed This Encounter   Procedures    XR CHEST (2 VW)     Standing Status:   Future     Standing Expiration Date:   4/5/2022     Order Specific Question:   Reason for exam:     Answer:   screen    CBC Auto Differential     Standing Status:   Future     Standing Expiration Date:   10/5/2021    Comprehensive Metabolic Panel     Standing Status:   Future     Standing Expiration Date:   10/5/2021    EKG 12 lead     Standing Status:   Future     Standing Expiration Date:   6/4/2021     Order Specific Question:   Reason for Exam?     Answer:   Pre-op     Orders Placed This Encounter   Medications    buPROPion (WELLBUTRIN XL) 300 MG extended release tablet     Sig: Take 1 tablet by mouth every morning     Dispense:  30 tablet     Refill:  11   DC citalopram changed to bupropion get laboratory studies chest x-ray EKG done and follow-up as needed I will also send clearance and once you receive this testing    Electronically signed by Annabel Flower DO on 4/5/2021 at 4:01 PM

## 2021-04-08 ENCOUNTER — HOSPITAL ENCOUNTER (OUTPATIENT)
Age: 41
Discharge: HOME | End: 2021-04-08
Payer: MEDICAID

## 2021-04-08 DIAGNOSIS — Z01.818 PRE-OPERATIVE CLEARANCE: ICD-10-CM

## 2021-04-08 DIAGNOSIS — E66.9 OBESITY, CLASS II, BMI 35-39.9: Primary | ICD-10-CM

## 2021-04-08 LAB
ABSOLUTE EOS #: 0.14 K/UL (ref 0–0.44)
ABSOLUTE IMMATURE GRANULOCYTE: 0.03 K/UL (ref 0–0.3)
ABSOLUTE LYMPH #: 2.58 K/UL (ref 1.1–3.7)
ABSOLUTE MONO #: 0.68 K/UL (ref 0.1–1.2)
ALBUMIN SERPL-MCNC: 3.6 G/DL (ref 3.5–5.2)
ALBUMIN/GLOBULIN RATIO: 1.1 (ref 1–2.5)
ALP BLD-CCNC: 59 U/L (ref 35–104)
ALT SERPL-CCNC: 11 U/L (ref 5–33)
ANION GAP SERPL CALCULATED.3IONS-SCNC: 7 MMOL/L (ref 9–17)
AST SERPL-CCNC: 20 U/L
BASOPHILS # BLD: 0 % (ref 0–2)
BASOPHILS ABSOLUTE: 0.03 K/UL (ref 0–0.2)
BILIRUB SERPL-MCNC: 0.19 MG/DL (ref 0.3–1.2)
BUN BLDV-MCNC: 10 MG/DL (ref 6–20)
BUN/CREAT BLD: ABNORMAL (ref 9–20)
CALCIUM SERPL-MCNC: 8.6 MG/DL (ref 8.6–10.4)
CHLORIDE BLD-SCNC: 108 MMOL/L (ref 98–107)
CO2: 24 MMOL/L (ref 20–31)
CREAT SERPL-MCNC: 0.58 MG/DL (ref 0.5–0.9)
DIFFERENTIAL TYPE: ABNORMAL
EKG ATRIAL RATE: 76 BPM
EKG P AXIS: 55 DEGREES
EKG P-R INTERVAL: 166 MS
EKG Q-T INTERVAL: 398 MS
EKG QRS DURATION: 78 MS
EKG QTC CALCULATION (BAZETT): 447 MS
EKG R AXIS: 42 DEGREES
EKG T AXIS: 40 DEGREES
EKG VENTRICULAR RATE: 76 BPM
EOSINOPHILS RELATIVE PERCENT: 2 % (ref 1–4)
GFR AFRICAN AMERICAN: >60 ML/MIN
GFR NON-AFRICAN AMERICAN: >60 ML/MIN
GFR SERPL CREATININE-BSD FRML MDRD: ABNORMAL ML/MIN/{1.73_M2}
GFR SERPL CREATININE-BSD FRML MDRD: ABNORMAL ML/MIN/{1.73_M2}
GLUCOSE BLD-MCNC: 84 MG/DL (ref 70–99)
HCT VFR BLD CALC: 34 % (ref 36.3–47.1)
HEMOGLOBIN: 11.1 G/DL (ref 11.9–15.1)
IMMATURE GRANULOCYTES: 0 %
LYMPHOCYTES # BLD: 28 % (ref 24–43)
MCH RBC QN AUTO: 31.2 PG (ref 25.2–33.5)
MCHC RBC AUTO-ENTMCNC: 32.6 G/DL (ref 28.4–34.8)
MCV RBC AUTO: 95.5 FL (ref 82.6–102.9)
MONOCYTES # BLD: 7 % (ref 3–12)
NRBC AUTOMATED: 0 PER 100 WBC
PDW BLD-RTO: 13.7 % (ref 11.8–14.4)
PLATELET # BLD: 350 K/UL (ref 138–453)
PLATELET ESTIMATE: ABNORMAL
PMV BLD AUTO: 9.5 FL (ref 8.1–13.5)
POTASSIUM SERPL-SCNC: 3.6 MMOL/L (ref 3.7–5.3)
RBC # BLD: 3.56 M/UL (ref 3.95–5.11)
RBC # BLD: ABNORMAL 10*6/UL
SEG NEUTROPHILS: 63 % (ref 36–65)
SEGMENTED NEUTROPHILS ABSOLUTE COUNT: 5.74 K/UL (ref 1.5–8.1)
SODIUM BLD-SCNC: 139 MMOL/L (ref 135–144)
TOTAL PROTEIN: 6.9 G/DL (ref 6.4–8.3)
WBC # BLD: 9.2 K/UL (ref 3.5–11.3)
WBC # BLD: ABNORMAL 10*3/UL

## 2021-04-08 PROCEDURE — 93010 ELECTROCARDIOGRAM REPORT: CPT | Performed by: INTERNAL MEDICINE

## 2021-04-08 PROCEDURE — 80053 COMPREHEN METABOLIC PANEL: CPT

## 2021-04-08 PROCEDURE — 93005 ELECTROCARDIOGRAM TRACING: CPT | Performed by: FAMILY MEDICINE

## 2021-04-08 PROCEDURE — 85025 COMPLETE CBC W/AUTO DIFF WBC: CPT

## 2021-04-08 PROCEDURE — 36415 COLL VENOUS BLD VENIPUNCTURE: CPT

## 2021-04-11 ENCOUNTER — HOSPITAL ENCOUNTER (OUTPATIENT)
Dept: LAB | Age: 41
Setting detail: SPECIMEN
Discharge: HOME | End: 2021-04-11
Payer: MEDICAID

## 2021-04-11 DIAGNOSIS — Z01.818 PREOP TESTING: Primary | ICD-10-CM

## 2021-04-11 PROCEDURE — U0005 INFEC AGEN DETEC AMPLI PROBE: HCPCS

## 2021-04-11 PROCEDURE — U0003 INFECTIOUS AGENT DETECTION BY NUCLEIC ACID (DNA OR RNA); SEVERE ACUTE RESPIRATORY SYNDROME CORONAVIRUS 2 (SARS-COV-2) (CORONAVIRUS DISEASE [COVID-19]), AMPLIFIED PROBE TECHNIQUE, MAKING USE OF HIGH THROUGHPUT TECHNOLOGIES AS DESCRIBED BY CMS-2020-01-R: HCPCS

## 2021-04-12 ENCOUNTER — TELEPHONE (OUTPATIENT)
Dept: OBGYN CLINIC | Age: 41
End: 2021-04-12

## 2021-04-12 LAB
SARS-COV-2: NORMAL
SARS-COV-2: NOT DETECTED
SOURCE: NORMAL

## 2021-04-13 ENCOUNTER — PATIENT MESSAGE (OUTPATIENT)
Dept: FAMILY MEDICINE CLINIC | Age: 41
End: 2021-04-13

## 2021-04-13 ENCOUNTER — HOSPITAL ENCOUNTER (OUTPATIENT)
Dept: GENERAL RADIOLOGY | Age: 41
Discharge: HOME | End: 2021-04-15
Payer: MEDICAID

## 2021-04-13 ENCOUNTER — HOSPITAL ENCOUNTER (OUTPATIENT)
Age: 41
Discharge: HOME | End: 2021-04-15
Payer: MEDICAID

## 2021-04-13 DIAGNOSIS — Z01.818 PRE-OPERATIVE CLEARANCE: ICD-10-CM

## 2021-04-13 PROCEDURE — 71046 X-RAY EXAM CHEST 2 VIEWS: CPT

## 2021-04-13 NOTE — PROGRESS NOTES
Dr. Emily Pablo notified we have been unable to reach patient. It appears as if patient has not had all required testing done to obtain medical clearance from Dr. Kristina Song. Office aware and trying to reach patient.

## 2021-04-13 NOTE — TELEPHONE ENCOUNTER
From: Katie Souza  To: Huyen Vaca DO  Sent: 4/13/2021 2:18 PM EDT  Subject: Non-Urgent Medical Question    What is the process for to approved for a breast reduction?

## 2021-04-14 ENCOUNTER — ANESTHESIA EVENT (OUTPATIENT)
Dept: OPERATING ROOM | Age: 41
DRG: 519 | End: 2021-04-14
Payer: MEDICAID

## 2021-04-14 RX ORDER — SCOLOPAMINE TRANSDERMAL SYSTEM 1 MG/1
1 PATCH, EXTENDED RELEASE TRANSDERMAL ONCE
Status: DISCONTINUED | OUTPATIENT
Start: 2021-04-14 | End: 2021-04-14

## 2021-04-14 NOTE — BRIEF OP NOTE
Brief Operative Note  Department of Obstetrics and Gynecology  [facility]     Patient: Rahat Becerril   : 1980  MRN: [id number]       Acct: [de-identified]   Date of Procedure: 21     Pre-operative Diagnosis: 39 y.o. female     AUB-L  Fibroid Uterus  Hx of C/S x1  Hx of emergent R ovarian cyst removal via laparotomy  Anemia 2/2 Chronic Blood Loss  BMI 40.3  Mirena IUD in place    Post-operative Diagnosis: same, pelvic adhesive disease    Procedure: IUD removal, diagnostic laparoscopy with lysis of adhesions converted to total abdominal hysterectomy with bilateral salpingectomy and left oophorectomy, right ovarian cystotomy    Surgeon: Raghavendra Salas DO     Assistant(s): Lorraine Bennett MD; Citlali Rodríguez, PGY3     Anesthesia: general     Findings:  Normal external genitalia, normal vaginal mucosa without prolapse, normal appearing cervix without lesions. Bimanual exam reveals single anteverted uterus approximately 14 cm with no adnexal fullness. Survey of pelvis reveals pelvic adhesive disease with omental adhesions to the abdominal wall in the midline and left lower quadrant, enlarged fibroid uterus with normal appearing right tube, right ovary with about 2cm simple cyst, left tube and ovary densely adhered to the uterus with filmy adhesions to the large bowel. Total IV fluids/Blood products:  1400 ml crystalloid  Urine Output:  500 ml    Estimated blood loss:  200ml  Drains:  jade catheter  Specimens:  Uterus, cervix, bilateral fallopian tubes and left ovary  Instrument and Sponge Count: Correct  Complications:  none  Condition:  Good and stable, transferred to post anesthesia recovery    See full operative report for further details.       Anibal Hauser DO   [facility] [place]  4/15/2021 12:59 PM

## 2021-04-14 NOTE — H&P
OB/GYN Pre-Op H&P  [address] [facility]    Patient Name: Naheed Lemon     Patient : 1980  Room/Bed: [facility]/NONE  Admission Date/Time: 4/15/2021  6:01 AM  Primary Care Physician: Vicente Zhu DO  MRN: [id number]    Date: 4/15/2021  Time: 7:00 AM    The patient was seen in pre-op holding. She is here for scheduled total laparoscopic hysterectomy (possibly open), bilateral salpingectomy, diagnostic cystoscopy. Naheed Lemon 39 y.o.  female has history of AUB causing anemia 2/2 chronic blood loss. Ultrasound on 20 showed fibroid uterus with largest measuring 6.0 x 3.8 x 3.6 cm. EMB on 20 was negative for hyperplasia, atypia, or neoplasm. Patient has tried Mirena IUD which is still in place and reports some improvement, but ultimately desires definitive management. She has completed her family status. She was counseled on R/B/A and elects to proceed with procedure as planned. The procedure risks and complications were reviewed. The labs, Consent, and H&P were reviewed and updated. The patient was counseled on the possibility of  the need of a second surgery. The patient voiced understanding and had all of her questions answered. The possibility of incomplete removal of abnormal tissue was discussed. OBSTETRICAL HISTORY:   OB History    Para Term  AB Living   1 1 1 0 0 1   SAB TAB Ectopic Molar Multiple Live Births   0 0 0 0 0 1      # Outcome Date GA Lbr Yfn/2nd Weight Sex Delivery Anes PTL Lv   1 Term  40w0d  7 lb 2 oz (3.232 kg) F CS-Unspec   SYLVIA      Complications: Dysfunctional Labor       PAST MEDICAL HISTORY:   has a past medical history of Arthritis, BMI 40.0-44.9, adult ([address] Fibroid, Headache, and Urge urinary incontinence. PAST SURGICAL HISTORY:   has a past surgical history that includes  section; ovarian cyst removal; and intrauterine device insertion (2020).     ALLERGIES:  Allergies as of 2021 - Review Complete 02/26/2021   Allergen Reaction Noted    Mobic [meloxicam]  02/25/2020       MEDICATIONS:  Current Facility-Administered Medications   Medication Dose Route Frequency Provider Last Rate Last Admin    ceFAZolin (ANCEF) 2000 mg in dextrose 5 % 50 mL IVPB  2,000 mg Intravenous Once See-Yin So, DO        scopolamine (TRANSDERM-SCOP) transdermal patch 1 patch  1 patch Transdermal Once See-Yin So, DO   1 patch at 04/15/21 0650    lactated ringers infusion   Intravenous Continuous Jordyn Hickman  mL/hr at 04/15/21 0652 New Bag at 04/15/21 3720    fentaNYL (SUBLIMAZE) injection 100 mcg  100 mcg Intravenous Once Jordyn Hickman MD        midazolam PF (VERSED) injection 2 mg  2 mg Intravenous Once Jordyn Hickman MD        bupivacaine (PF) (MARCAINE) 0.5 % injection 200 mg  40 mL Infiltration Once Jordyn Hickman MD           FAMILY HISTORY:  family history is not on file. SOCIAL HISTORY:   reports that she has never smoked. She has never used smokeless tobacco. She reports current alcohol use. She reports that she does not use drugs.     VITALS:  Vitals:    04/15/21 0629 04/15/21 0645   BP:  124/70   Pulse:  72   Resp:  20   Temp:  96.8 °F (36 °C)   TempSrc:  Temporal   SpO2:  98%   Weight: 235 lb (106.6 kg)    Height: 5' 4\" (1.626 m)                                                                                                                                PHYSICAL EXAM:     Unchanged from Prior H&P  CONSTITUTIONAL:  Alert and oriented, no acute distress  HEAD: normocephalic, atraumatic  EYES: Pupils equal and reactive to light, Extraocular muscles intact, sclera non icteric  ENT: Mucus membranes moist, No otorrhea, no rhinorrhea  NECK:  supple, symmetrical, trachea midline   LUNGS:  Good air movement bilaterally, unlabored respirations, no wheezes or rhonchi  CARDIOVASCULAR: Regular rate and rhythm, no murmurs rubs or gallops  ABDOMEN: soft, non tender, non distended, no rebound or guarding, no hernias, no hepatomegaly, no splenomegly  MUSCULOSKELETAL:  Equal strength bilaterally, normal muscle tone  SKIN: No abscess or rash  NEUROLOGIC:  Cranial nerves 2-12 grossly intact, no focal deficits  PSYCH: affect appropriate  Pelvic Exam: deferred to OR      LAB RESULTS:  Admission on 04/15/2021   Component Date Value Ref Range Status    HCG, Pregnancy Urine (POC) 04/15/2021 NEGATIVE  NEGATIVE Final    Comment: Specimens with hCG levels near the threshold of the test (25 mIU/mL) may give a negative or   indeterminate result. In such cases, another test should be performed with a new specimen   in 48-72 hours. If early pregnancy is suspected clinically in this setting, correlation   with quantitative serum b-hCG level is suggested. Hospital Outpatient Visit on 04/11/2021   Component Date Value Ref Range Status    SARS-CoV-2 04/11/2021        Final    Source 04/11/2021 . NASOPHARYNGEAL SWAB   Final    SARS-CoV-2 04/11/2021 Not Detected  Not Detected Final    Comment:       The specimen is NEGATIVE for SARS-CoV-2, the novel coronavirus associated with COVID-19. A negative result does not rule out COVID-19. Kamla SARS-CoV-2 for use on the [employer] 6800/8800 [employer] is a real-time RT-PCR test intended   for the qualitative detection of nucleic acids from SARS-CoV-2  in clinician-collected nasal, nasopharyngeal, and oropharyngeal swab specimens from   individuals who meet COVID-19 clinical and/or epidemiological criteria. Kamla SARS-CoV-2 is for use only under Emergency Use Authorization (EUA) in laboratories   certified under [address] (CLIA), 42 U. S.C. §394K,   that meet requirements to perform high or moderate complexity tests. An individual without symptoms of COVID-19 and who is not shedding SARS-CoV-2 virus would   expect to have a negative (not detected) result in this assay.   Fact sheet for Healthcare Providers: [facility].es  Fact sheet for Patients: https://www.                           fda.gov/media/580097/download        METHODOLOGY: RT-PCR     Hospital Outpatient Visit on 04/08/2021   Component Date Value Ref Range Status    Glucose 04/08/2021 84  70 - 99 mg/dL Final    BUN 04/08/2021 10  6 - 20 mg/dL Final    CREATININE 04/08/2021 0.58  0.50 - 0.90 mg/dL Final    Bun/Cre Ratio 04/08/2021 NOT REPORTED  9 - 20 Final    Calcium 04/08/2021 8.6  8.6 - 10.4 mg/dL Final    Sodium 04/08/2021 139  135 - 144 mmol/L Final    Potassium 04/08/2021 3.6* 3.7 - 5.3 mmol/L Final    Chloride 04/08/2021 108* 98 - 107 mmol/L Final    CO2 04/08/2021 24  20 - 31 mmol/L Final    Anion Gap 04/08/2021 7* 9 - 17 mmol/L Final    Alkaline Phosphatase 04/08/2021 59  35 - 104 U/L Final    ALT 04/08/2021 11  5 - 33 U/L Final    AST 04/08/2021 20  <32 U/L Final    Total Bilirubin 04/08/2021 0.19* 0.3 - 1.2 mg/dL Final    Total Protein 04/08/2021 6.9  6.4 - 8.3 g/dL Final    Albumin 04/08/2021 3.6  3.5 - 5.2 g/dL Final    Albumin/Globulin Ratio 04/08/2021 1.1  1.0 - 2.5 Final    GFR Non- 04/08/2021 >60  >60 mL/min Final    GFR  04/08/2021 >60  >60 mL/min Final    GFR Comment 04/08/2021        Final    Comment: Average GFR for 38-51 years old:   80 mL/min/1.73sq m  Chronic Kidney Disease:   <60 mL/min/1.73sq m  Kidney failure:   <15 mL/min/1.73sq m              eGFR calculated using average adult body mass.  Additional eGFR calculator available at:        [employer]            GFR Staging 04/08/2021 NOT REPORTED   Final    WBC 04/08/2021 9.2  3.5 - 11.3 k/uL Final    RBC 04/08/2021 3.56* 3.95 - 5.11 m/uL Final    Hemoglobin 04/08/2021 11.1* 11.9 - 15.1 g/dL Final    Hematocrit 04/08/2021 34.0* 36.3 - 47.1 % Final    MCV 04/08/2021 95.5  82.6 - 102.9 fL Final    MCH 04/08/2021 31.2  25.2 - 33.5 pg Final    MCHC 04/08/2021 32.6 28.4 - 34.8 g/dL Final    RDW 04/08/2021 13.7  11.8 - 14.4 % Final    Platelets 02/61/8179 350  138 - 453 k/uL Final    MPV 04/08/2021 9.5  8.1 - 13.5 fL Final    NRBC Automated 04/08/2021 0.0  0.0 per 100 WBC Final    Differential Type 04/08/2021 NOT REPORTED   Final    Seg Neutrophils 04/08/2021 63  36 - 65 % Final    Lymphocytes 04/08/2021 28  24 - 43 % Final    Monocytes 04/08/2021 7  3 - 12 % Final    Eosinophils % 04/08/2021 2  1 - 4 % Final    Basophils 04/08/2021 0  0 - 2 % Final    Immature Granulocytes 04/08/2021 0  0 % Final    Segs Absolute 04/08/2021 5.74  1.50 - 8.10 k/uL Final    Absolute Lymph # 04/08/2021 2.58  1.10 - 3.70 k/uL Final    Absolute Mono # 04/08/2021 0.68  0.10 - 1.20 k/uL Final    Absolute Eos # 04/08/2021 0.14  0.00 - 0.44 k/uL Final    Basophils Absolute 04/08/2021 0.03  0.00 - 0.20 k/uL Final    Absolute Immature Granulocyte 04/08/2021 0.03  0.00 - 0.30 k/uL Final    WBC Morphology 04/08/2021 NOT REPORTED   Final    RBC Morphology 04/08/2021 NOT REPORTED   Final    Platelet Estimate 16/07/2162 NOT REPORTED   Final   Hospital Outpatient Visit on 04/08/2021   Component Date Value Ref Range Status    Ventricular Rate 04/08/2021 76  BPM Final    Atrial Rate 04/08/2021 76  BPM Final    P-R Interval 04/08/2021 166  ms Final    QRS Duration 04/08/2021 78  ms Final    Q-T Interval 04/08/2021 398  ms Final    QTc Calculation (Bazett) 04/08/2021 447  ms Final    P Axis 04/08/2021 55  degrees Final    R Axis 04/08/2021 42  degrees Final    T Axis 04/08/2021 40  degrees Final       DIAGNOSTICS:    Xr Chest (2 Vw)    Result Date: 4/13/2021  EXAMINATION: TWO XRAY VIEWS OF THE CHEST 4/13/2021 12:07 pm COMPARISON: February 1, 2020, chest exam HISTORY: ORDERING SYSTEM PROVIDED HISTORY: Pre-operative clearance TECHNOLOGIST PROVIDED HISTORY: screen Reason for Exam: Pre-op Acuity: Unknown Type of Exam: Unknown FINDINGS: Borderline cardiomegaly Mild tortuosity of the thoracic aorta There are no significant pleural or parenchymal findings Mild S-shaped scoliosis of the thoracolumbar spine     No acute cardiopulmonary findings     EXAMINATION:   PELVIC ULTRASOUND       7/17/2020       TECHNIQUE:   Transabdominal and transvaginal pelvic ultrasound was performed.       COMPARISON:   Pelvic ultrasound September 6, 2017.       HISTORY:   ORDERING SYSTEM PROVIDED HISTORY: Fibroids       FINDINGS:       Measurements:       Uterus:  14.1 x 8.5 x 10.2 cm       Endometrial stripe:  2.6 cm       Right Ovary:  2.9 x 2.1 x 2.3 cm       Left Ovary:  2.8 x 2.2 x 3.1 cm.           Ultrasound Findings:       Uterus: Uterus is enlarged and heterogeneous in echotexture with 2 measurable   fibroids, largest measuring 6.0 x 3.8 x 3.6 cm.  Largest fibroid measured on   the prior study measured 4.4 x 4.4 x 3.1 cm.       Endometrial stripe: Endometrial stripe is within normal limits.       Right Ovary: Right ovary is within normal limits.       Left Ovary:  Left ovary is within normal limits.       Free Fluid: No evidence of free fluid.           Impression   1. Fibroid uterus, largest fibroid now measures 6.0 x 3.8 x 3.6 cm.  Findings   appear to have progressed since the 2017 study. 2. Normal ovaries and endometrium. ENDOMETRIUM, BIOPSY:        - SECRETORY ENDOMETRIUM.      - NO HYPERPLASIA, ATYPIA, OR NEOPLASM IDENTIFIED. Ben Jara M.D.   **Electronically Signed Out**         [facility]/7/24/2020         Clinical Information   Pre-op Diagnosis:  ABNORMAL UTERINE BLEEDING, FIBROIDS, IRON   DEFICIENCY ANEMIA DUE TO CHRONIC BLOOD LOSS   Operative Findings:  EMB (PER CONTAINER)     Source of Specimen   1: EMB (PER CONTAINER)     Gross Description   \"NATHAN García\" Tan-red tissue fragments, 2.0 x 1.1 x 0.2 cm in   aggregate.  Entirely 1cs.  rh tm       Microscopic Description   Microscopic examination performed.      SURGICAL PATHOLOGY CONSULTATION         Patient Name: Dung Sands Med Rec: [id number]   Path Number: [id number]     [address], [facility] [address]. [place], [address]   [phone]   Fax: [phone]       DIAGNOSIS & PLAN:  Katie Souza is a 39 y.o. Fariha Perish with AUB-L and Mirena IUD in place   - Proceed with planned procedure: IUD removal, TLH, (possible open), BS, and diagnostic cystoscopy   - Consent signed, on chart. - The patient is ready for transport to the operative suite. Counseling: The patient was counseled on all options both medical and surgical, conservative as well as definitive. She has elected to proceed with the procedure as stated above. The patient was counseled on the procedure. Risks and complications were reviewed in detail. The patients orders, labs, consents have been completed. The history and physical as well as all supporting surgical documentation will be forwarded to the pre-operative holding area. The patient is aware that this procedure may not alleviate her symptoms. That there may be a necessity for a second surgery and that there may be an incomplete removal of abnormal tissue. Anderson Daily, DO  Ob/Gyn Resident  Pager: [phone]  [address] [place] [address]  4/15/2021, 7:00 AM          Attending Physician Statement  I have discussed the care of Katie Souza, including pertinent history and exam findings,  with the resident. I have reviewed the key elements of all parts of the encounter with the resident. Patient is a 39 y.o. Fariha Perish with AUB-L and mirena IUD in place who desires definitive surgical management. Risks, benefits and alternatives of surgery discussed, including but not limited to bleeding, scarring, infection, injury to surrounding tissues (bowel, bladder, nerves, vessels, etc.), need for more surgery, need for blood or blood products, death, post-op clots of lung of legs, and pneumonia.  All questions answered and

## 2021-04-15 ENCOUNTER — ANESTHESIA (OUTPATIENT)
Dept: OPERATING ROOM | Age: 41
DRG: 519 | End: 2021-04-15
Payer: MEDICAID

## 2021-04-15 ENCOUNTER — HOSPITAL ENCOUNTER (INPATIENT)
Age: 41
LOS: 2 days | Discharge: HOME | DRG: 519 | End: 2021-04-17
Attending: OBSTETRICS & GYNECOLOGY | Admitting: OBSTETRICS & GYNECOLOGY
Payer: MEDICAID

## 2021-04-15 VITALS — TEMPERATURE: 96.7 F | DIASTOLIC BLOOD PRESSURE: 99 MMHG | SYSTOLIC BLOOD PRESSURE: 131 MMHG | OXYGEN SATURATION: 100 %

## 2021-04-15 DIAGNOSIS — G89.18 POST-OP PAIN: Primary | ICD-10-CM

## 2021-04-15 PROBLEM — Z90.710 S/P HYSTERECTOMY: Status: ACTIVE | Noted: 2021-04-15

## 2021-04-15 LAB
ABO/RH: NORMAL
ANTIBODY SCREEN: NEGATIVE
ARM BAND NUMBER: NORMAL
EXPIRATION DATE: NORMAL
HCG, PREGNANCY URINE (POC): NEGATIVE

## 2021-04-15 PROCEDURE — 0UN14ZZ RELEASE LEFT OVARY, PERCUTANEOUS ENDOSCOPIC APPROACH: ICD-10-PCS | Performed by: OBSTETRICS & GYNECOLOGY

## 2021-04-15 PROCEDURE — 6370000000 HC RX 637 (ALT 250 FOR IP): Performed by: OBSTETRICS & GYNECOLOGY

## 2021-04-15 PROCEDURE — 2500000003 HC RX 250 WO HCPCS: Performed by: ANESTHESIOLOGY

## 2021-04-15 PROCEDURE — 3600000004 HC SURGERY LEVEL 4 BASE: Performed by: OBSTETRICS & GYNECOLOGY

## 2021-04-15 PROCEDURE — 3600000014 HC SURGERY LEVEL 4 ADDTL 15MIN: Performed by: OBSTETRICS & GYNECOLOGY

## 2021-04-15 PROCEDURE — 6360000002 HC RX W HCPCS: Performed by: STUDENT IN AN ORGANIZED HEALTH CARE EDUCATION/TRAINING PROGRAM

## 2021-04-15 PROCEDURE — 87086 URINE CULTURE/COLONY COUNT: CPT

## 2021-04-15 PROCEDURE — 2580000003 HC RX 258: Performed by: OBSTETRICS & GYNECOLOGY

## 2021-04-15 PROCEDURE — G0378 HOSPITAL OBSERVATION PER HR: HCPCS

## 2021-04-15 PROCEDURE — 6360000002 HC RX W HCPCS: Performed by: OBSTETRICS & GYNECOLOGY

## 2021-04-15 PROCEDURE — 0UT10ZZ RESECTION OF LEFT OVARY, OPEN APPROACH: ICD-10-PCS | Performed by: OBSTETRICS & GYNECOLOGY

## 2021-04-15 PROCEDURE — 2700000000 HC OXYGEN THERAPY PER DAY

## 2021-04-15 PROCEDURE — 0U900ZZ DRAINAGE OF RIGHT OVARY, OPEN APPROACH: ICD-10-PCS | Performed by: OBSTETRICS & GYNECOLOGY

## 2021-04-15 PROCEDURE — 88307 TISSUE EXAM BY PATHOLOGIST: CPT

## 2021-04-15 PROCEDURE — 2720000010 HC SURG SUPPLY STERILE: Performed by: OBSTETRICS & GYNECOLOGY

## 2021-04-15 PROCEDURE — 2709999900 HC NON-CHARGEABLE SUPPLY: Performed by: OBSTETRICS & GYNECOLOGY

## 2021-04-15 PROCEDURE — 2580000003 HC RX 258: Performed by: ANESTHESIOLOGY

## 2021-04-15 PROCEDURE — 3700000001 HC ADD 15 MINUTES (ANESTHESIA): Performed by: OBSTETRICS & GYNECOLOGY

## 2021-04-15 PROCEDURE — 0DNW4ZZ RELEASE PERITONEUM, PERCUTANEOUS ENDOSCOPIC APPROACH: ICD-10-PCS | Performed by: OBSTETRICS & GYNECOLOGY

## 2021-04-15 PROCEDURE — 0UN90ZZ RELEASE UTERUS, OPEN APPROACH: ICD-10-PCS | Performed by: OBSTETRICS & GYNECOLOGY

## 2021-04-15 PROCEDURE — 0UPD7HZ REMOVAL OF CONTRACEPTIVE DEVICE FROM UTERUS AND CERVIX, VIA NATURAL OR ARTIFICIAL OPENING: ICD-10-PCS | Performed by: OBSTETRICS & GYNECOLOGY

## 2021-04-15 PROCEDURE — 7100000000 HC PACU RECOVERY - FIRST 15 MIN: Performed by: OBSTETRICS & GYNECOLOGY

## 2021-04-15 PROCEDURE — 2500000003 HC RX 250 WO HCPCS: Performed by: OBSTETRICS & GYNECOLOGY

## 2021-04-15 PROCEDURE — 6370000000 HC RX 637 (ALT 250 FOR IP): Performed by: STUDENT IN AN ORGANIZED HEALTH CARE EDUCATION/TRAINING PROGRAM

## 2021-04-15 PROCEDURE — 6360000002 HC RX W HCPCS

## 2021-04-15 PROCEDURE — 2500000003 HC RX 250 WO HCPCS: Performed by: NURSE ANESTHETIST, CERTIFIED REGISTERED

## 2021-04-15 PROCEDURE — 3E0T3BZ INTRODUCTION OF ANESTHETIC AGENT INTO PERIPHERAL NERVES AND PLEXI, PERCUTANEOUS APPROACH: ICD-10-PCS | Performed by: ANESTHESIOLOGY

## 2021-04-15 PROCEDURE — 2500000003 HC RX 250 WO HCPCS

## 2021-04-15 PROCEDURE — 86850 RBC ANTIBODY SCREEN: CPT

## 2021-04-15 PROCEDURE — 6360000002 HC RX W HCPCS: Performed by: NURSE ANESTHETIST, CERTIFIED REGISTERED

## 2021-04-15 PROCEDURE — 58150 TOTAL HYSTERECTOMY: CPT | Performed by: OBSTETRICS & GYNECOLOGY

## 2021-04-15 PROCEDURE — 0UT70ZZ RESECTION OF BILATERAL FALLOPIAN TUBES, OPEN APPROACH: ICD-10-PCS | Performed by: OBSTETRICS & GYNECOLOGY

## 2021-04-15 PROCEDURE — 3700000000 HC ANESTHESIA ATTENDED CARE: Performed by: OBSTETRICS & GYNECOLOGY

## 2021-04-15 PROCEDURE — 81025 URINE PREGNANCY TEST: CPT

## 2021-04-15 PROCEDURE — 0UT90ZZ RESECTION OF UTERUS, OPEN APPROACH: ICD-10-PCS | Performed by: OBSTETRICS & GYNECOLOGY

## 2021-04-15 PROCEDURE — 1200000000 HC SEMI PRIVATE

## 2021-04-15 PROCEDURE — 6360000002 HC RX W HCPCS: Performed by: ANESTHESIOLOGY

## 2021-04-15 PROCEDURE — 86901 BLOOD TYPING SEROLOGIC RH(D): CPT

## 2021-04-15 PROCEDURE — 86900 BLOOD TYPING SEROLOGIC ABO: CPT

## 2021-04-15 PROCEDURE — 2580000003 HC RX 258: Performed by: STUDENT IN AN ORGANIZED HEALTH CARE EDUCATION/TRAINING PROGRAM

## 2021-04-15 PROCEDURE — 7100000001 HC PACU RECOVERY - ADDTL 15 MIN: Performed by: OBSTETRICS & GYNECOLOGY

## 2021-04-15 PROCEDURE — 64488 TAP BLOCK BI INJECTION: CPT | Performed by: ANESTHESIOLOGY

## 2021-04-15 RX ORDER — FENTANYL CITRATE 50 UG/ML
100 INJECTION, SOLUTION INTRAMUSCULAR; INTRAVENOUS ONCE
Status: COMPLETED | OUTPATIENT
Start: 2021-04-15 | End: 2021-04-15

## 2021-04-15 RX ORDER — LIDOCAINE HYDROCHLORIDE 10 MG/ML
INJECTION, SOLUTION EPIDURAL; INFILTRATION; INTRACAUDAL; PERINEURAL PRN
Status: DISCONTINUED | OUTPATIENT
Start: 2021-04-15 | End: 2021-04-15 | Stop reason: SDUPTHER

## 2021-04-15 RX ORDER — DOCUSATE SODIUM 100 MG/1
100 CAPSULE, LIQUID FILLED ORAL 2 TIMES DAILY
Status: DISCONTINUED | OUTPATIENT
Start: 2021-04-15 | End: 2021-04-17 | Stop reason: HOSPADM

## 2021-04-15 RX ORDER — DOCUSATE SODIUM 100 MG/1
100 CAPSULE, LIQUID FILLED ORAL 2 TIMES DAILY PRN
Qty: 60 CAPSULE | Refills: 1 | Status: SHIPPED | OUTPATIENT
Start: 2021-04-15

## 2021-04-15 RX ORDER — ONDANSETRON 4 MG/1
4 TABLET, ORALLY DISINTEGRATING ORAL EVERY 8 HOURS PRN
Qty: 15 TABLET | Refills: 0 | Status: SHIPPED | OUTPATIENT
Start: 2021-04-15 | End: 2021-04-20

## 2021-04-15 RX ORDER — KETOROLAC TROMETHAMINE 30 MG/ML
INJECTION, SOLUTION INTRAMUSCULAR; INTRAVENOUS PRN
Status: DISCONTINUED | OUTPATIENT
Start: 2021-04-15 | End: 2021-04-15 | Stop reason: SDUPTHER

## 2021-04-15 RX ORDER — FENTANYL CITRATE 50 UG/ML
50 INJECTION, SOLUTION INTRAMUSCULAR; INTRAVENOUS EVERY 5 MIN PRN
Status: DISCONTINUED | OUTPATIENT
Start: 2021-04-15 | End: 2021-04-15 | Stop reason: HOSPADM

## 2021-04-15 RX ORDER — NEOSTIGMINE METHYLSULFATE 5 MG/5 ML
SYRINGE (ML) INTRAVENOUS PRN
Status: DISCONTINUED | OUTPATIENT
Start: 2021-04-15 | End: 2021-04-15 | Stop reason: SDUPTHER

## 2021-04-15 RX ORDER — UREA 10 %
3 LOTION (ML) TOPICAL NIGHTLY PRN
Status: DISCONTINUED | OUTPATIENT
Start: 2021-04-15 | End: 2021-04-17 | Stop reason: HOSPADM

## 2021-04-15 RX ORDER — DEXAMETHASONE SODIUM PHOSPHATE 10 MG/ML
INJECTION INTRAMUSCULAR; INTRAVENOUS PRN
Status: DISCONTINUED | OUTPATIENT
Start: 2021-04-15 | End: 2021-04-15 | Stop reason: SDUPTHER

## 2021-04-15 RX ORDER — HYDROCODONE BITARTRATE AND ACETAMINOPHEN 5; 325 MG/1; MG/1
2 TABLET ORAL EVERY 4 HOURS PRN
Status: DISCONTINUED | OUTPATIENT
Start: 2021-04-15 | End: 2021-04-17

## 2021-04-15 RX ORDER — BUPIVACAINE HYDROCHLORIDE 5 MG/ML
INJECTION, SOLUTION EPIDURAL; INTRACAUDAL
Status: COMPLETED | OUTPATIENT
Start: 2021-04-15 | End: 2021-04-15

## 2021-04-15 RX ORDER — PROPOFOL 10 MG/ML
INJECTION, EMULSION INTRAVENOUS PRN
Status: DISCONTINUED | OUTPATIENT
Start: 2021-04-15 | End: 2021-04-15 | Stop reason: SDUPTHER

## 2021-04-15 RX ORDER — GLYCOPYRROLATE 1 MG/5 ML
SYRINGE (ML) INTRAVENOUS PRN
Status: DISCONTINUED | OUTPATIENT
Start: 2021-04-15 | End: 2021-04-15 | Stop reason: SDUPTHER

## 2021-04-15 RX ORDER — ONDANSETRON 2 MG/ML
INJECTION INTRAMUSCULAR; INTRAVENOUS PRN
Status: DISCONTINUED | OUTPATIENT
Start: 2021-04-15 | End: 2021-04-15 | Stop reason: SDUPTHER

## 2021-04-15 RX ORDER — FAMOTIDINE 20 MG/1
20 TABLET, FILM COATED ORAL 2 TIMES DAILY
Status: DISCONTINUED | OUTPATIENT
Start: 2021-04-15 | End: 2021-04-17 | Stop reason: HOSPADM

## 2021-04-15 RX ORDER — HYDROCODONE BITARTRATE AND ACETAMINOPHEN 5; 325 MG/1; MG/1
1 TABLET ORAL EVERY 4 HOURS PRN
Status: DISCONTINUED | OUTPATIENT
Start: 2021-04-15 | End: 2021-04-17

## 2021-04-15 RX ORDER — HYDROXYZINE 50 MG/1
50 TABLET, FILM COATED ORAL EVERY 6 HOURS PRN
Status: DISCONTINUED | OUTPATIENT
Start: 2021-04-15 | End: 2021-04-17 | Stop reason: HOSPADM

## 2021-04-15 RX ORDER — BUPIVACAINE HYDROCHLORIDE 5 MG/ML
40 INJECTION, SOLUTION EPIDURAL; INTRACAUDAL ONCE
Status: COMPLETED | OUTPATIENT
Start: 2021-04-15 | End: 2021-04-15

## 2021-04-15 RX ORDER — PROMETHAZINE HYDROCHLORIDE 12.5 MG/1
12.5 SUPPOSITORY RECTAL EVERY 6 HOURS PRN
Qty: 15 SUPPOSITORY | Refills: 0 | Status: SHIPPED | OUTPATIENT
Start: 2021-04-15 | End: 2021-04-22

## 2021-04-15 RX ORDER — SODIUM CHLORIDE 0.9 % (FLUSH) 0.9 %
10 SYRINGE (ML) INJECTION EVERY 12 HOURS SCHEDULED
Status: DISCONTINUED | OUTPATIENT
Start: 2021-04-15 | End: 2021-04-17 | Stop reason: HOSPADM

## 2021-04-15 RX ORDER — CALCIUM CARBONATE 200(500)MG
500 TABLET,CHEWABLE ORAL 3 TIMES DAILY PRN
Status: DISCONTINUED | OUTPATIENT
Start: 2021-04-15 | End: 2021-04-17 | Stop reason: HOSPADM

## 2021-04-15 RX ORDER — SODIUM CHLORIDE, SODIUM LACTATE, POTASSIUM CHLORIDE, CALCIUM CHLORIDE 600; 310; 30; 20 MG/100ML; MG/100ML; MG/100ML; MG/100ML
INJECTION, SOLUTION INTRAVENOUS CONTINUOUS
Status: DISCONTINUED | OUTPATIENT
Start: 2021-04-15 | End: 2021-04-15

## 2021-04-15 RX ORDER — ROCURONIUM BROMIDE 10 MG/ML
INJECTION, SOLUTION INTRAVENOUS PRN
Status: DISCONTINUED | OUTPATIENT
Start: 2021-04-15 | End: 2021-04-15 | Stop reason: SDUPTHER

## 2021-04-15 RX ORDER — FENTANYL CITRATE 50 UG/ML
INJECTION, SOLUTION INTRAMUSCULAR; INTRAVENOUS PRN
Status: DISCONTINUED | OUTPATIENT
Start: 2021-04-15 | End: 2021-04-15 | Stop reason: SDUPTHER

## 2021-04-15 RX ORDER — SODIUM CHLORIDE 9 MG/ML
25 INJECTION, SOLUTION INTRAVENOUS PRN
Status: DISCONTINUED | OUTPATIENT
Start: 2021-04-15 | End: 2021-04-17 | Stop reason: HOSPADM

## 2021-04-15 RX ORDER — BUPROPION HYDROCHLORIDE 150 MG/1
300 TABLET ORAL EVERY MORNING
Status: DISCONTINUED | OUTPATIENT
Start: 2021-04-16 | End: 2021-04-16

## 2021-04-15 RX ORDER — IBUPROFEN 600 MG/1
600 TABLET ORAL EVERY 6 HOURS
Status: DISCONTINUED | OUTPATIENT
Start: 2021-04-16 | End: 2021-04-16

## 2021-04-15 RX ORDER — SODIUM CHLORIDE 0.9 % (FLUSH) 0.9 %
10 SYRINGE (ML) INJECTION PRN
Status: DISCONTINUED | OUTPATIENT
Start: 2021-04-15 | End: 2021-04-17 | Stop reason: HOSPADM

## 2021-04-15 RX ORDER — FENTANYL CITRATE 50 UG/ML
25 INJECTION, SOLUTION INTRAMUSCULAR; INTRAVENOUS EVERY 5 MIN PRN
Status: DISCONTINUED | OUTPATIENT
Start: 2021-04-15 | End: 2021-04-15 | Stop reason: HOSPADM

## 2021-04-15 RX ORDER — HYDROCODONE BITARTRATE AND ACETAMINOPHEN 5; 325 MG/1; MG/1
1 TABLET ORAL EVERY 6 HOURS PRN
Qty: 20 TABLET | Refills: 0 | Status: SHIPPED | OUTPATIENT
Start: 2021-04-15 | End: 2021-04-17 | Stop reason: HOSPADM

## 2021-04-15 RX ORDER — BUPIVACAINE HYDROCHLORIDE 5 MG/ML
INJECTION, SOLUTION PERINEURAL PRN
Status: DISCONTINUED | OUTPATIENT
Start: 2021-04-15 | End: 2021-04-15 | Stop reason: ALTCHOICE

## 2021-04-15 RX ORDER — SODIUM CHLORIDE 9 MG/ML
INJECTION, SOLUTION INTRAVENOUS CONTINUOUS
Status: DISCONTINUED | OUTPATIENT
Start: 2021-04-15 | End: 2021-04-16

## 2021-04-15 RX ORDER — SIMETHICONE 80 MG
80 TABLET,CHEWABLE ORAL EVERY 6 HOURS PRN
Qty: 180 TABLET | Refills: 0 | Status: SHIPPED | OUTPATIENT
Start: 2021-04-15

## 2021-04-15 RX ORDER — GABAPENTIN 300 MG/1
300 CAPSULE ORAL DAILY
Status: DISCONTINUED | OUTPATIENT
Start: 2021-04-15 | End: 2021-04-16

## 2021-04-15 RX ORDER — MIDAZOLAM HYDROCHLORIDE 2 MG/2ML
2 INJECTION, SOLUTION INTRAMUSCULAR; INTRAVENOUS ONCE
Status: COMPLETED | OUTPATIENT
Start: 2021-04-15 | End: 2021-04-15

## 2021-04-15 RX ORDER — SOFT LENS RINSE,STORE SOLUTION
1 SOLUTION, NON-ORAL MISCELLANEOUS PRN
Status: DISCONTINUED | OUTPATIENT
Start: 2021-04-15 | End: 2021-04-17 | Stop reason: HOSPADM

## 2021-04-15 RX ORDER — CITALOPRAM 20 MG/1
20 TABLET ORAL DAILY
Status: DISCONTINUED | OUTPATIENT
Start: 2021-04-16 | End: 2021-04-16

## 2021-04-15 RX ORDER — ONDANSETRON 2 MG/ML
4 INJECTION INTRAMUSCULAR; INTRAVENOUS EVERY 6 HOURS PRN
Status: DISCONTINUED | OUTPATIENT
Start: 2021-04-15 | End: 2021-04-17 | Stop reason: HOSPADM

## 2021-04-15 RX ORDER — ACETAMINOPHEN 325 MG/1
650 TABLET ORAL EVERY 4 HOURS PRN
Status: DISCONTINUED | OUTPATIENT
Start: 2021-04-15 | End: 2021-04-17 | Stop reason: HOSPADM

## 2021-04-15 RX ORDER — SCOLOPAMINE TRANSDERMAL SYSTEM 1 MG/1
1 PATCH, EXTENDED RELEASE TRANSDERMAL ONCE
Status: DISCONTINUED | OUTPATIENT
Start: 2021-04-15 | End: 2021-04-15

## 2021-04-15 RX ORDER — DIPHENHYDRAMINE HYDROCHLORIDE 50 MG/ML
INJECTION INTRAMUSCULAR; INTRAVENOUS PRN
Status: DISCONTINUED | OUTPATIENT
Start: 2021-04-15 | End: 2021-04-15 | Stop reason: SDUPTHER

## 2021-04-15 RX ORDER — KETOROLAC TROMETHAMINE 30 MG/ML
30 INJECTION, SOLUTION INTRAMUSCULAR; INTRAVENOUS EVERY 6 HOURS
Status: DISCONTINUED | OUTPATIENT
Start: 2021-04-15 | End: 2021-04-15

## 2021-04-15 RX ORDER — IBUPROFEN 600 MG/1
600 TABLET ORAL EVERY 6 HOURS PRN
Qty: 120 TABLET | Refills: 0 | Status: SHIPPED | OUTPATIENT
Start: 2021-04-15

## 2021-04-15 RX ORDER — ONDANSETRON 4 MG/1
4 TABLET, ORALLY DISINTEGRATING ORAL EVERY 8 HOURS PRN
Status: DISCONTINUED | OUTPATIENT
Start: 2021-04-15 | End: 2021-04-17 | Stop reason: HOSPADM

## 2021-04-15 RX ORDER — KETOROLAC TROMETHAMINE 30 MG/ML
30 INJECTION, SOLUTION INTRAMUSCULAR; INTRAVENOUS EVERY 6 HOURS
Status: COMPLETED | OUTPATIENT
Start: 2021-04-15 | End: 2021-04-16

## 2021-04-15 RX ORDER — MAGNESIUM HYDROXIDE 1200 MG/15ML
LIQUID ORAL CONTINUOUS PRN
Status: COMPLETED | OUTPATIENT
Start: 2021-04-15 | End: 2021-04-15

## 2021-04-15 RX ORDER — SIMETHICONE 80 MG
80 TABLET,CHEWABLE ORAL EVERY 6 HOURS PRN
Status: DISCONTINUED | OUTPATIENT
Start: 2021-04-15 | End: 2021-04-17 | Stop reason: HOSPADM

## 2021-04-15 RX ADMIN — MIDAZOLAM HYDROCHLORIDE 2 MG: 1 INJECTION, SOLUTION INTRAMUSCULAR; INTRAVENOUS at 07:16

## 2021-04-15 RX ADMIN — DOCUSATE SODIUM 100 MG: 100 CAPSULE, LIQUID FILLED ORAL at 20:47

## 2021-04-15 RX ADMIN — GABAPENTIN 300 MG: 300 CAPSULE ORAL at 16:48

## 2021-04-15 RX ADMIN — CEFAZOLIN 2000 MG: 10 INJECTION, POWDER, FOR SOLUTION INTRAVENOUS at 11:54

## 2021-04-15 RX ADMIN — ROCURONIUM BROMIDE 10 MG: 10 INJECTION INTRAVENOUS at 08:31

## 2021-04-15 RX ADMIN — KETOROLAC TROMETHAMINE 30 MG: 30 INJECTION, SOLUTION INTRAMUSCULAR; INTRAVENOUS at 18:15

## 2021-04-15 RX ADMIN — FENTANYL CITRATE 100 MCG: 50 INJECTION, SOLUTION INTRAMUSCULAR; INTRAVENOUS at 07:16

## 2021-04-15 RX ADMIN — FENTANYL CITRATE 100 MCG: 50 INJECTION, SOLUTION INTRAMUSCULAR; INTRAVENOUS at 07:37

## 2021-04-15 RX ADMIN — ROCURONIUM BROMIDE 10 MG: 10 INJECTION INTRAVENOUS at 10:47

## 2021-04-15 RX ADMIN — ONDANSETRON 4 MG: 2 INJECTION INTRAMUSCULAR; INTRAVENOUS at 11:58

## 2021-04-15 RX ADMIN — SODIUM CHLORIDE, POTASSIUM CHLORIDE, SODIUM LACTATE AND CALCIUM CHLORIDE: 600; 310; 30; 20 INJECTION, SOLUTION INTRAVENOUS at 06:52

## 2021-04-15 RX ADMIN — BUPIVACAINE HYDROCHLORIDE 40 ML: 5 INJECTION, SOLUTION EPIDURAL; INTRACAUDAL; PERINEURAL at 07:21

## 2021-04-15 RX ADMIN — FENTANYL CITRATE 50 MCG: 50 INJECTION, SOLUTION INTRAMUSCULAR; INTRAVENOUS at 10:47

## 2021-04-15 RX ADMIN — FENTANYL CITRATE 50 MCG: 50 INJECTION, SOLUTION INTRAMUSCULAR; INTRAVENOUS at 13:39

## 2021-04-15 RX ADMIN — Medication 40 ML: at 07:21

## 2021-04-15 RX ADMIN — FENTANYL CITRATE 50 MCG: 50 INJECTION, SOLUTION INTRAMUSCULAR; INTRAVENOUS at 13:05

## 2021-04-15 RX ADMIN — Medication 2 MG: at 12:32

## 2021-04-15 RX ADMIN — FAMOTIDINE 20 MG: 20 TABLET, FILM COATED ORAL at 20:47

## 2021-04-15 RX ADMIN — LIDOCAINE HYDROCHLORIDE 50 MG: 10 INJECTION, SOLUTION EPIDURAL; INFILTRATION; INTRACAUDAL; PERINEURAL at 07:37

## 2021-04-15 RX ADMIN — HYDROMORPHONE HYDROCHLORIDE 0.5 MG: 1 INJECTION, SOLUTION INTRAMUSCULAR; INTRAVENOUS; SUBCUTANEOUS at 14:45

## 2021-04-15 RX ADMIN — PROPOFOL 200 MG: 10 INJECTION, EMULSION INTRAVENOUS at 07:37

## 2021-04-15 RX ADMIN — CEFAZOLIN 2000 MG: 10 INJECTION, POWDER, FOR SOLUTION INTRAVENOUS at 07:56

## 2021-04-15 RX ADMIN — DEXAMETHASONE SODIUM PHOSPHATE 4 MG: 10 INJECTION INTRAMUSCULAR; INTRAVENOUS at 07:56

## 2021-04-15 RX ADMIN — ROCURONIUM BROMIDE 50 MG: 10 INJECTION INTRAVENOUS at 07:37

## 2021-04-15 RX ADMIN — Medication 0.4 MG: at 12:32

## 2021-04-15 RX ADMIN — SODIUM CHLORIDE: 9 INJECTION, SOLUTION INTRAVENOUS at 16:50

## 2021-04-15 RX ADMIN — KETOROLAC TROMETHAMINE 30 MG: 30 INJECTION, SOLUTION INTRAMUSCULAR at 11:59

## 2021-04-15 RX ADMIN — ROCURONIUM BROMIDE 20 MG: 10 INJECTION INTRAVENOUS at 08:54

## 2021-04-15 RX ADMIN — FENTANYL CITRATE 50 MCG: 50 INJECTION, SOLUTION INTRAMUSCULAR; INTRAVENOUS at 12:51

## 2021-04-15 RX ADMIN — SODIUM CHLORIDE, POTASSIUM CHLORIDE, SODIUM LACTATE AND CALCIUM CHLORIDE: 600; 310; 30; 20 INJECTION, SOLUTION INTRAVENOUS at 11:04

## 2021-04-15 RX ADMIN — FENTANYL CITRATE 50 MCG: 50 INJECTION, SOLUTION INTRAMUSCULAR; INTRAVENOUS at 13:15

## 2021-04-15 RX ADMIN — HYDROMORPHONE HYDROCHLORIDE 0.5 MG: 1 INJECTION, SOLUTION INTRAMUSCULAR; INTRAVENOUS; SUBCUTANEOUS at 14:10

## 2021-04-15 RX ADMIN — Medication 12.5 MG: at 07:56

## 2021-04-15 RX ADMIN — HYDROMORPHONE HYDROCHLORIDE 1 MG: 1 INJECTION, SOLUTION INTRAMUSCULAR; INTRAVENOUS; SUBCUTANEOUS at 20:47

## 2021-04-15 RX ADMIN — HYDROMORPHONE HYDROCHLORIDE 0.5 MG: 1 INJECTION, SOLUTION INTRAMUSCULAR; INTRAVENOUS; SUBCUTANEOUS at 14:23

## 2021-04-15 RX ADMIN — HYDROCODONE BITARTRATE AND ACETAMINOPHEN 2 TABLET: 5; 325 TABLET ORAL at 16:48

## 2021-04-15 ASSESSMENT — PULMONARY FUNCTION TESTS
PIF_VALUE: 35
PIF_VALUE: 24
PIF_VALUE: 26
PIF_VALUE: 23
PIF_VALUE: 29
PIF_VALUE: 30
PIF_VALUE: 25
PIF_VALUE: 3
PIF_VALUE: 27
PIF_VALUE: 26
PIF_VALUE: 27
PIF_VALUE: 30
PIF_VALUE: 29
PIF_VALUE: 28
PIF_VALUE: 2
PIF_VALUE: 30
PIF_VALUE: 19
PIF_VALUE: 25
PIF_VALUE: 26
PIF_VALUE: 4
PIF_VALUE: 27
PIF_VALUE: 29
PIF_VALUE: 25
PIF_VALUE: 28
PIF_VALUE: 27
PIF_VALUE: 28
PIF_VALUE: 29
PIF_VALUE: 25
PIF_VALUE: 35
PIF_VALUE: 28
PIF_VALUE: 29
PIF_VALUE: 27
PIF_VALUE: 22
PIF_VALUE: 34
PIF_VALUE: 28
PIF_VALUE: 2
PIF_VALUE: 25
PIF_VALUE: 1
PIF_VALUE: 27
PIF_VALUE: 26
PIF_VALUE: 3
PIF_VALUE: 28
PIF_VALUE: 26
PIF_VALUE: 34
PIF_VALUE: 35
PIF_VALUE: 29
PIF_VALUE: 34
PIF_VALUE: 29
PIF_VALUE: 29
PIF_VALUE: 24
PIF_VALUE: 26
PIF_VALUE: 26
PIF_VALUE: 30
PIF_VALUE: 29
PIF_VALUE: 27
PIF_VALUE: 28
PIF_VALUE: 27
PIF_VALUE: 2
PIF_VALUE: 26
PIF_VALUE: 27
PIF_VALUE: 26
PIF_VALUE: 26
PIF_VALUE: 1
PIF_VALUE: 25
PIF_VALUE: 1
PIF_VALUE: 35
PIF_VALUE: 30
PIF_VALUE: 27
PIF_VALUE: 36
PIF_VALUE: 27
PIF_VALUE: 35
PIF_VALUE: 27
PIF_VALUE: 27
PIF_VALUE: 13
PIF_VALUE: 26
PIF_VALUE: 27
PIF_VALUE: 1
PIF_VALUE: 32
PIF_VALUE: 27
PIF_VALUE: 34
PIF_VALUE: 25
PIF_VALUE: 35
PIF_VALUE: 26
PIF_VALUE: 35
PIF_VALUE: 1
PIF_VALUE: 26
PIF_VALUE: 29
PIF_VALUE: 29
PIF_VALUE: 34
PIF_VALUE: 29
PIF_VALUE: 26
PIF_VALUE: 23
PIF_VALUE: 29
PIF_VALUE: 29
PIF_VALUE: 27
PIF_VALUE: 28
PIF_VALUE: 27
PIF_VALUE: 26
PIF_VALUE: 27
PIF_VALUE: 30
PIF_VALUE: 35
PIF_VALUE: 35
PIF_VALUE: 28
PIF_VALUE: 27
PIF_VALUE: 34
PIF_VALUE: 28
PIF_VALUE: 27
PIF_VALUE: 30
PIF_VALUE: 26
PIF_VALUE: 28
PIF_VALUE: 27
PIF_VALUE: 26
PIF_VALUE: 28
PIF_VALUE: 25
PIF_VALUE: 28
PIF_VALUE: 27
PIF_VALUE: 27
PIF_VALUE: 26
PIF_VALUE: 25
PIF_VALUE: 26
PIF_VALUE: 25
PIF_VALUE: 2
PIF_VALUE: 24
PIF_VALUE: 14
PIF_VALUE: 29
PIF_VALUE: 28
PIF_VALUE: 27
PIF_VALUE: 24
PIF_VALUE: 29
PIF_VALUE: 35
PIF_VALUE: 25
PIF_VALUE: 25
PIF_VALUE: 26
PIF_VALUE: 25
PIF_VALUE: 29
PIF_VALUE: 25
PIF_VALUE: 24
PIF_VALUE: 29
PIF_VALUE: 26
PIF_VALUE: 29
PIF_VALUE: 26
PIF_VALUE: 35
PIF_VALUE: 26
PIF_VALUE: 30
PIF_VALUE: 1
PIF_VALUE: 23
PIF_VALUE: 26
PIF_VALUE: 26
PIF_VALUE: 25
PIF_VALUE: 35
PIF_VALUE: 14
PIF_VALUE: 26
PIF_VALUE: 35
PIF_VALUE: 28
PIF_VALUE: 25
PIF_VALUE: 26
PIF_VALUE: 1
PIF_VALUE: 28
PIF_VALUE: 27
PIF_VALUE: 29
PIF_VALUE: 27
PIF_VALUE: 28
PIF_VALUE: 35
PIF_VALUE: 29
PIF_VALUE: 26
PIF_VALUE: 26

## 2021-04-15 ASSESSMENT — PAIN DESCRIPTION - ONSET: ONSET: ON-GOING

## 2021-04-15 ASSESSMENT — PAIN SCALES - GENERAL
PAINLEVEL_OUTOF10: 9
PAINLEVEL_OUTOF10: 8
PAINLEVEL_OUTOF10: 8
PAINLEVEL_OUTOF10: 4
PAINLEVEL_OUTOF10: 0
PAINLEVEL_OUTOF10: 10
PAINLEVEL_OUTOF10: 10
PAINLEVEL_OUTOF10: 9
PAINLEVEL_OUTOF10: 10
PAINLEVEL_OUTOF10: 10

## 2021-04-15 ASSESSMENT — PAIN DESCRIPTION - FREQUENCY: FREQUENCY: CONTINUOUS

## 2021-04-15 ASSESSMENT — PAIN DESCRIPTION - LOCATION
LOCATION: ABDOMEN
LOCATION: ABDOMEN;PERINEUM

## 2021-04-15 ASSESSMENT — PAIN DESCRIPTION - PAIN TYPE
TYPE: SURGICAL PAIN
TYPE: SURGICAL PAIN

## 2021-04-15 ASSESSMENT — ENCOUNTER SYMPTOMS: SHORTNESS OF BREATH: 0

## 2021-04-15 ASSESSMENT — PAIN DESCRIPTION - DESCRIPTORS: DESCRIPTORS: ACHING;CONSTANT;DISCOMFORT

## 2021-04-15 NOTE — FLOWSHEET NOTE
Patient was walking in [place] with nurse and family. She shared emotions as she engaged in conversation.  provided active listening and emotional support. Patient expressed gratitude for support. Follow as needed/requested. 04/15/21 8173   Encounter Summary   Services provided to: Patient and family together   Referral/Consult From: Nurse   Continue Visiting   (4/15/2021)   Complexity of Encounter Moderate   Length of Encounter 15 minutes   Spiritual Assessment Completed Yes   Routine   Type Initial   Assessment Approachable; Anxious   Intervention Sustaining presence/ Ministry of presence; Explored feelings, thoughts, concerns; Active listening   Outcome Venting emotion;Receptive;Coping;Expressed gratitude

## 2021-04-15 NOTE — PROGRESS NOTES
[phone] Dr Fletcher Dailey to the bedside, time out performed, Pt monitored, 02, Julio Tap  nerve block completed using Bupivacaine, 0.5% 20 ml to each side,   pt tolerated procedure well,  Site CDI, (see charting), vss  Versed Given: 2 mg  Fentanyl  100 mcg, pt denies co pian or discomfort, family to the waiting room.

## 2021-04-15 NOTE — OP NOTE
Operative Note  Department of Obstetrics and Gynecology  [address] [facility]       Patient: Ashwini Kuo   : 1980  MRN: [id number]       Acct: [de-identified]   PCP: Justin Aranda DO  Date of Procedure: 4/15/21    Pre-operative Diagnosis: 39 y.o. female     AUB-L  Fibroid Uterus  Hx of C/S x1  Hx of emergent R ovarian cyst removal via laparotomy  Anemia 2/2 Chronic Blood Loss  BMI 40.3  Mirena IUD in place     Post-operative Diagnosis: same, pelvic adhesive disease     Procedure: IUD removal, diagnostic laparoscopy with lysis of adhesions converted to total abdominal hysterectomy with bilateral salpingectomy and left oophorectomy, right ovarian cystotomy     Surgeon: Jordana Rios So,      Assistant(s): Magaly Charles MD; Aura Treviño, PGY3      Anesthesia: general     Indications: Ashwini Kuo is a 39 y.o.  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 Ultrasound on 20 showed an enlarged fibroid uterus with largest fibroid measuring 6.0 x 3.8 x 3.6 cm. EMB on 20 was negative for hyperplasia, atypia, or neoplasm. Patient has tried Mirena IUD which is still in place and reports some improvement, but ultimately desires definitive management. She has completed her family status. She was counseled on R/B/A and elects to proceed with procedure as planned. Procedure Details   The patient was seen in the pre-op room. The risks, benefits, complications, treatment options, and expected outcomes were discussed with the patient. The patient concurred with the proposed plan, giving informed consent. The patient was taken to the Operating Room and identified as Ashwini Kuo and the procedure was verified. A Time Out was held and the above information confirmed.      After administration of general anesthesia, the patient was placed in the  dorsolithotomy position with Liang Ely and examination under general anesthesia was performed with findings as noted below. A jade catheter was inserted into the bladder. The patient was prepped and draped in the usual sterile fashion. A weighted speculum was placed into the vagina to visualize the cervix. The strings of the Mirena IUD were visualized and grasped with a ringed forceps. The Mirena IUD was removed from the uterus easily. The cervix was then grasped with a single-tooth tenaculum. The cervix was dilated with Hanks dilators. A [employer] uterine manipulator was inserted into the cervical canal to aid in visualization during the laparoscopic portion of the case. Attention was then turned to the abdominal portion of the case. An approximately 5 mm supraumbilical incision was made using a scalpel. All trocar sites were injected pre-emptively with 0.5% Marcaine. The skin was grasped on either side of the incision and manually elevated with perforating towel clamps. A Veress needle was placed through this incision while tenting up the anterior abdominal wall. Saline bubble drop test was undergone and passed and gas was noted to flow under low pressures. Once pneumoperitoneum was obtained, the Veress needle was removed and the 5mm trocar was placed through this incision while tenting up the anterior abdominal wall utilizing optiview technique. Intra-abdominal placement was confirmed with the laparoscope and the underlying structures visualized were without trauma. The patient was then placed in trendelenberg position. Brief survey of the pelvis noted significant midline adhesions to anterior abdominal wall as well as pelvic adhesions involving bowel, left ovary, left fallopian tube, and uterus. Of note, the left ovary and fallopian tube were completely adherent to the uterus. A 5mm port was then placed in the RLQ under direct visualization. Utilizing blunt dissection and gyrus device, the anterior abdominal wall adhesions were taken down to aid in visualization. Greater than 30 minutes were spent for lysis of adhesions. device. A window was created in avascular portion of broad ligament using Bovie electrocautery and Gyrus device was utilized to cauterize and cut the utero-ovarian ligament on the right. Next the round ligament on the right was cauterized and cut using the Gyrus device. Then the anterior and posterior leaves of the broad ligament were skeletonized to the level of the uterine artery and the bladder flap was created from right to left. Then attention was turned to the left. The bowel was further dissected off the left ovary and left posterior portion of the uterus. The left IP ligament was isolated, doubly clamped, cut and suture ligated with 0 Vicryl. Next the round ligament on the left was cauterized and cut using the Gyrus device. Then the anterior and posterior leaves of the broad ligament on the left were skeletonized to the level of the uterine artery. Bladder flap was created on the left and extended to meet the right. Bladder flap was further developed with digital traction and a Ray-Bimal. Once the bladder was sufficiently dissected off the anterior uterus and cervix, a curved Jayden clamp was placed at a 45 degree angle to the uterus to incorporate the left uterine artery. Pedicle was cut with Metzenbaum scissors and was suture ligated with 0 Vicryl, Jayden stitch. The same procedure was repeated on the patient's right. Visualization continued to be impaired due to size of uterus and large fibroid. Decision was made to proceed with myomectomy. Uterus was incised over fibroid using Bovie electrocautery. Fibroid was grasped with single tooth tenaculum and was dissected away from uterus bluntly. Visualization was still impaired and uterus was amputated at the corporal-isthmic junction, along with bilateral fallopian tubes and left ovary, with Bovie electrocautery. Cervical stump was grasped with a single-tooth tenaculum. On the left side, a straight Jayden clamp was used to develop a cardinal ligament pedicle. This was cut with a scalpel and suture ligated with 0 Vicryl Jayden stitch, and the uterosacral ligament complex was additionally clamped, cut and tied in a similar fashion. This was repeated on the right. This continued to just above the distal end of the cervix. At this point, curved Jayden clamps were placed just under the distal portion of the cervix. Juanita scissors were utilized to make a circumferential cut to free the cervix from the vaginal cuff. The specimen was inspected and found to be intact. The corners of the vaginal cuff were grasped with a Kocher bilaterally and Allis clamps were placed on the anterior and posterior aspect of vaginal cuff. Utilizing 0-Vicryl sutures, a figure of 8 stitch was placed in each corner of the vaginal cuff incorporating the utero sacral ligaments. The remainder of the vaginal cuff was closed using interrupted figure of 8 sutures of 0 Vicryl. There was a small amount of superficial oozing noted near the right corner of the vaginal cuff which was coagulated using the bovie electrocautery. The pelvis was then copiously irrigated and aspirated and pedicles and cuff were found to be hemostatic. Surgicel powder was placed over the right and left ovarian fossa, as well as over the vaginal cuff for excellent hemostasis. All lap, sponge and instruments were removed from the abdomen. Bowel was returned to its anatomical position. All instrument, sponge, and need counts were correct prior to closure of fascia. Gloves and gowns were changed. Underneath the surface of the superficial fascia was examined and hemostasis was noted. Rectus muscles were inspected and were hemostatic. The fascia was then closed with 0 Vicryl from each apex towards the midline. All laparotomy, sponge, needle and instrument counts were correct. The subcutaneous layer was copiously irrigated and closed using 2-0 Plain gut in running fashion.  The skin was closed with 4-0 monocryl in a running

## 2021-04-15 NOTE — DISCHARGE SUMMARY
Benzocaine-Menthol  Take 1 lozenge by mouth every 2 hours as needed (sore throat)     docusate sodium 100 MG capsule  Commonly known as: Colace  Take 1 capsule by mouth 2 times daily as needed for Constipation     ondansetron 4 MG disintegrating tablet  Commonly known as: ZOFRAN-ODT  Take 1 tablet by mouth every 8 hours as needed for Nausea or Vomiting     oxyCODONE-acetaminophen 5-325 MG per tablet  Commonly known as: PERCOCET  Take 1 tablet by mouth every 6 hours as needed for Pain for up to 5 days. promethazine 12.5 MG suppository  Commonly known as: Phenergan  Place 1 suppository rectally every 6 hours as needed for Nausea     simethicone 80 MG chewable tablet  Commonly known as: MYLICON  Take 1 tablet by mouth every 6 hours as needed for Flatulence     Sore Throat Spray 1.4 % Liqd mouth spray  Generic drug: phenol  Take 1 drop by mouth once for 1 dose        CONTINUE taking these medications    albuterol sulfate  (90 Base) MCG/ACT inhaler  Inhale 2 puffs into the lungs every 6 hours as needed for Wheezing     buPROPion 300 MG extended release tablet  Commonly known as:  Wellbutrin XL  Take 1 tablet by mouth every morning     citalopram 20 MG tablet  Commonly known as: CeleXA  Take 1 tablet by mouth daily     ferrous sulfate 325 (65 Fe) MG tablet  Commonly known as: Amira-Deep  Take 2 tablets by mouth daily     fluticasone 50 MCG/ACT nasal spray  Commonly known as: Flonase  2 sprays by Nasal route daily     gabapentin 300 MG capsule  Commonly known as: NEURONTIN     hydrOXYzine 50 MG tablet  Commonly known as: ATARAX  Take 1 tablet by mouth every 6 hours as needed for Anxiety     ibuprofen 600 MG tablet  Commonly known as: ADVIL;MOTRIN  Take 1 tablet by mouth every 6 hours as needed for Pain     Myrbetriq 50 MG Tb24  Generic drug: mirabegron     tiZANidine 4 MG tablet  Commonly known as: Zanaflex  Take 1 tablet by mouth every 8 hours as needed (muscle spasm)           Where to Get Your Medications You can get these medications from any pharmacy    Bring a paper prescription for each of these medications  · Cepacol 15-2.3 MG Lozg  · docusate sodium 100 MG capsule  · ibuprofen 600 MG tablet  · ondansetron 4 MG disintegrating tablet  · oxyCODONE-acetaminophen 5-325 MG per tablet  · promethazine 12.5 MG suppository  · simethicone 80 MG chewable tablet  · Sore Throat Spray 1.4 % Liqd mouth spray           Activity: pelvic rest x 6-12 weeks, no driving on narcotics, no lifting greater than 10 lbs  Diet: regular diet  Follow up: 1 week (4/22/21) for dressing removal, wound check, post op follow up    Condition on discharge: good and stable   Discharge Date: 4/17/21    Comments:  Home care, Follow-up care, restrictions reviewed.     Emre Washington DO  Ob/Gyn Resident  [id number] [facility]  4/17/2021, 7:23 AM

## 2021-04-15 NOTE — CARE COORDINATION
Case Management Initial Discharge Plan  Michaela Velez,             Met with:patient to discuss discharge plans. Information verified: address, contacts, phone number, , insurance Yes    Emergency Contact/Next of Kin name & number: Lan Gleason (mother) [phone]    PCP: Jr Sewell DO  Date of last visit: 2 weeks    Insurance Provider: [facility]    Discharge Planning    Living Arrangements:  Children   Support Systems:  Family Members    Home has 2 stories  4 stairs to climb to get into front door, 1 flight stairs to climb to reach second floor  Location of bedroom/bathroom in home 2nd    Patient able to perform ADL's:Independent    Current Services (outpatient & in home)   DME equipment:   DME provider:     Receiving oral anticoagulation therapy? No    If indicated:   Physician managing anticoagulation treatment:   Where does patient obtain lab work for ATC treatment? Potential Assistance Needed:  N/A    Patient agreeable to home care: Yes  Freedom of choice provided:  yes    Prior SNF/Rehab Placement and Facility:   Agreeable to SNF/Rehab: No  [place]isk of choice provided: n/a     Evaluation: no    Expected Discharge date:  21    Patient expects to be discharged to:  Home  Follow Up Appointment: Best Day/ Time: Wednesday PM    Transportation provider:   Transportation arrangements needed for discharge: No    Readmission Risk              Risk of Unplanned Readmission:        0             Does patient have a readmission risk score greater than 14?: No  If yes, follow-up appointment must be made within 7 days of discharge. Goals of Care: comfort      Discharge Plan: home to mother's house and home care.  Fisk of choice list provided          Electronically signed by Kristin Pickard RN on 4/15/21 at 5:49 PM EDT

## 2021-04-15 NOTE — ANESTHESIA PRE PROCEDURE
Department of Anesthesiology  Preprocedure Note       Name:  Bismark Pineda   Age:  39 y.o.  :  1980                                          MRN:  [id number]         Date:  4/15/2021      Surgeon: Champ Brown):  Adin So, DO    Procedure: Procedure(s):  TOTAL LAPAROSCOPIC HYSTERECTOMY, POSSIBLE OPEN, BILATERAL SALPINGECTOMY, DIAGNOSTIC CYSTOSCOPY    Medications prior to admission:   Prior to Admission medications    Medication Sig Start Date End Date Taking? Authorizing Provider   fluticasone (FLONASE) 50 MCG/ACT nasal spray 2 sprays by Nasal route daily 20  Yes Jesse Raya MD   buPROPion (WELLBUTRIN XL) 300 MG extended release tablet Take 1 tablet by mouth every morning 21   Caren Tomlinson,    mirabegron (MYRBETRIQ) 50 MG TB24 Take 50 mg by mouth daily    Historical Provider, MD   citalopram (CELEXA) 20 MG tablet Take 1 tablet by mouth daily 20   Caren Tomlinson,    hydrOXYzine (ATARAX) 50 MG tablet Take 1 tablet by mouth every 6 hours as needed for Anxiety 20   Adin So, DO   gabapentin (NEURONTIN) 300 MG capsule Take 300 mg by mouth daily.   20   Historical Provider, MD   tiZANidine (ZANAFLEX) 4 MG tablet Take 1 tablet by mouth every 8 hours as needed (muscle spasm) 20   Jesse Raya MD   ferrous sulfate (LUL-KEE) 325 (65 Fe) MG tablet Take 2 tablets by mouth daily 20   Jesse Raya MD   ibuprofen (ADVIL;MOTRIN) 600 MG tablet Take 1 tablet by mouth every 6 hours as needed for Pain 19   ALEXEY Razo - CNP   albuterol sulfate  (90 BASE) MCG/ACT inhaler Inhale 2 puffs into the lungs every 6 hours as needed for Wheezing 3/27/17   Rosangela Holder MD       Current medications:    Current Facility-Administered Medications   Medication Dose Route Frequency Provider Last Rate Last Admin    ceFAZolin (ANCEF) 2000 mg in dextrose 5 % 50 mL IVPB  2,000 mg Intravenous Once Adin So, DO        scopolamine (TRANSDERM-SCOP) transdermal patch 1 patch  1 patch Transdermal Once See-Yin So, DO           Allergies: Allergies   Allergen Reactions    Mobic [Meloxicam]      Abdominal pain       Problem List:    Patient Active Problem List   Diagnosis Code    Obesity, Class II, BMI 35-39.9 E66.9    Hx of  section Z98.891    Abnormal uterine bleeding (AUB) N93.9    Injury of right rotator cuff S46.001A    Fibroids D21.9    Iron deficiency anemia due to chronic blood loss D50.0    Urge urinary incontinence N39.41    H/O recurrent BV N76.0, B96.89       Past Medical History:        Diagnosis Date    Arthritis     BMI 40.0-44.9, adult ([facility] [address] 2016    Fibroid     Headache     Urge urinary incontinence 2020       Past Surgical History:        Procedure Laterality Date     SECTION      INTRAUTERINE DEVICE INSERTION  2020    mirena    OVARIAN CYST REMOVAL      right       Social History:    Social History     Tobacco Use    Smoking status: Never Smoker    Smokeless tobacco: Never Used   Substance Use Topics    Alcohol use: Yes     Comment: social                                Counseling given: Not Answered      Vital Signs (Current):   Vitals:    04/15/21 0629 04/15/21 0645   BP:  124/70   Pulse:  72   Resp:  20   Temp:  96.8 °F (36 °C)   TempSrc:  Temporal   SpO2:  98%   Weight: 235 lb (106.6 kg)    Height: 5' 4\" (1.626 m)                                               BP Readings from Last 3 Encounters:   04/15/21 124/70   21 135/86   21 (!) 142/86       NPO Status: Time of last liquid consumption:                         Time of last solid consumption:                         Date of last liquid consumption: 21                        Date of last solid food consumption: 21    BMI:   Wt Readings from Last 3 Encounters:   04/15/21 235 lb (106.6 kg)   21 235 lb 12.8 oz (107 kg)   21 233 lb 3.2 oz (105.8 kg)     Body mass index is 40.34 kg/m².     CBC:   Lab Results   Component Value Date    WBC 9.2 04/08/2021    RBC 3.56 04/08/2021    HGB 11.1 04/08/2021    HCT 34.0 04/08/2021    MCV 95.5 04/08/2021    RDW 13.7 04/08/2021     04/08/2021       CMP:   Lab Results   Component Value Date     04/08/2021    K 3.6 04/08/2021     04/08/2021    CO2 24 04/08/2021    BUN 10 04/08/2021    CREATININE 0.58 04/08/2021    GFRAA >60 04/08/2021    LABGLOM >60 04/08/2021    GLUCOSE 84 04/08/2021    PROT 6.9 04/08/2021    CALCIUM 8.6 04/08/2021    BILITOT 0.19 04/08/2021    ALKPHOS 59 04/08/2021    AST 20 04/08/2021    ALT 11 04/08/2021       POC Tests: No results for input(s): POCGLU, POCNA, POCK, POCCL, POCBUN, POCHEMO, POCHCT in the last 72 hours.     Coags:   Lab Results   Component Value Date    PROTIME 10.1 09/01/2017    INR 0.9 09/01/2017    APTT 23.7 09/01/2017       HCG (If Applicable):   Lab Results   Component Value Date    PREGTESTUR negative 08/05/2020    HCG NEGATIVE 04/15/2021    HCGQUANT <1 09/01/2017        ABGs: No results found for: PHART, PO2ART, XSA2GPN, ROH5CSF, BEART, R5TCLPSV     Type & Screen (If Applicable):  No results found for: LABABO, LABRH    Drug/Infectious Status (If Applicable):  Lab Results   Component Value Date    HEPCAB NONREACTIVE 01/26/2021       COVID-19 Screening (If Applicable):   Lab Results   Component Value Date    COVID19 Not Detected 04/11/2021           Anesthesia Evaluation  Patient summary reviewed and Nursing notes reviewed no history of anesthetic complications:   Airway: Mallampati: I  TM distance: >3 FB   Neck ROM: full  Mouth opening: > = 3 FB Dental: normal exam         Pulmonary:normal exam  breath sounds clear to auscultation      (-) COPD, asthma, shortness of breath, recent URI and sleep apnea                           Cardiovascular:Negative CV ROS  Exercise tolerance: good (>4 METS),       (-) hypertension, past MI, CAD, CABG/stent,  angina,  CHF, orthopnea and  SEXTON      Rhythm: regular  Rate: normal                    Neuro/Psych: (+) headaches:, psychiatric history:   (-) seizures, TIA and CVA           GI/Hepatic/Renal:   (+) morbid obesity     (-) GERD       Endo/Other:        (-) diabetes mellitus                ROS comment: AUB, uterine fibroid Abdominal:           Vascular: negative vascular ROS. Anesthesia Plan      general and regional     ASA 3     (GA ET  Pre-op TAP)  Induction: intravenous. MIPS: Postoperative opioids intended and Prophylactic antiemetics administered. Anesthetic plan and risks discussed with patient.       Plan discussed with CRNA and surgical team.                  Jordyn Hickman MD   4/15/2021

## 2021-04-15 NOTE — ANESTHESIA PROCEDURE NOTES
Peripheral Block    Patient location during procedure: pre-op  Start time: 4/15/2021 7:16 AM  End time: 4/15/2021 7:21 AM  Staffing  Performed: anesthesiologist   Anesthesiologist: Val Tierney MD  Preanesthetic Checklist  Completed: patient identified, IV checked, site marked, risks and benefits discussed, surgical consent, monitors and equipment checked, pre-op evaluation, timeout performed, anesthesia consent given, oxygen available and patient being monitored  Peripheral Block  Patient position: supine  Prep: ChloraPrep  Patient monitoring: cardiac monitor, continuous pulse ox, frequent blood pressure checks and IV access  Block type: TAP  Laterality: bilateral  Injection technique: single-shot  Guidance: ultrasound guided  Local infiltration: lidocaine  Infiltration strength: 1 %  Dose: 3 mL  Provider prep: mask and sterile gloves  Local infiltration: lidocaine  Needle  Needle type: combined needle/nerve stimulator   Needle gauge: 20 G  Needle length: 10 cm  Needle localization: ultrasound guidance  Assessment  Injection assessment: negative aspiration for heme, no paresthesia on injection and local visualized surrounding nerve on ultrasound  Paresthesia pain: none  Slow fractionated injection: yes  Hemodynamics: stable  Additional Notes  U/S [id number].  (1) Under ultrasound guidance, a 20 gauge needle was inserted and placed in close proximity to the nerves in the transverse abdominus plane (TAP).  (2) Ultrasound was also used to visualize the spread of the anesthetic in the TAP. (3) The anatomy appeared normal, and (4) there were no apparent abnormal pathological findings on the image that were readily visible and related to the nerves being blocked. (5) A permanent ultrasound image was saved in the patient's record.     20 mL of 0.5% bupivacaine per side        Medications Administered  Bupivacaine (MARCAINE) PF injection 0.5%, 40 mL  Reason for block: post-op pain management and at surgeon's request

## 2021-04-15 NOTE — ANESTHESIA POSTPROCEDURE EVALUATION
Department of Anesthesiology  Postprocedure Note    Patient: Unruly Loco  MRN: [id number]  [place]: 1980  Date of evaluation: 4/15/2021  Time:  2:04 PM     Procedure Summary     Date: 04/15/21 Room / Location: [facility] 09 / [facility]    Anesthesia Start: 0730 Anesthesia Stop: 6075    Procedure: TOTAL LAPAROSCOPIC HYSTERECTOMY, BILATERAL SALPINGECTOMY, CONVERSION TO OPEN, RIGHT OVARIAN CYSTOTOMY, LYSIS OF ADHESIONS, IUD REMOVAL, LEFT OOPHORECTOMY (N/A ) Diagnosis: (ABNORMAL UTERINE BLEEDING, UTERINE FIBROID)    Surgeons: Chris Strong DO Responsible Provider: Teresa Campos MD    Anesthesia Type: general, regional ASA Status: 3          Anesthesia Type: general, regional    Astrid Phase I: Astrid Score: 10    Astrid Phase II:      Last vitals: Reviewed and per EMR flowsheets.        Anesthesia Post Evaluation    Patient location during evaluation: PACU  Patient participation: complete - patient participated  Level of consciousness: awake and alert  Pain score: 0  Airway patency: patent  Nausea & Vomiting: no nausea and no vomiting  Complications: no  Cardiovascular status: hemodynamically stable  Respiratory status: acceptable, spontaneous ventilation and room air  Hydration status: euvolemic

## 2021-04-15 NOTE — PROGRESS NOTES
Gynecology Progress Note    Date: 2021  Time: 7:16 AM    Kirt Cowan 39 y.o. female , POD # 1 s/p IUD removal, diagnostic laparoscopy with lysis of adhesions converted to total abdominal hysterectomy with bilateral salpingectomy, left oophorectomy and right cystotomy on 4/15/21    Patient seen and examined. She is in good spirits this morning. She does report being sore and experiencing dry throat. Pain is controlled with current medications. She has cepacol ordered for dry throat which she says is helping. Patient is  tolerating oral intake. She is urinating well. She reports any vaginal spotting the first time she urinated, but none since then. She is  ambulating with assistance. She reports passing flatus once. She denies Fever/Chills, Chest Pain, SOB, N/V, Calf Pain. Home meds reviewed with patient. She reports that she does not want her Wellbutrin, Celexa, Mirabegron, or Zanaflex ordered. She states she will resume Wellbutrin and Celexa once she is discharged. She states she is no longer having urinary urgency since the surgery, so she does not want to taker her Mirabegron. She reports she experiences drowsiness at time with Zanaflex, so does not want to take it with narcotics. Patient would like to continue her hydroxyzine and gabapentin PRN while inpatient. Hydroxyzine and gabapentin ordered. Vitals:  Vitals:    04/15/21 1552 04/15/21 1939 21 0031 21 0412   BP: 128/81 131/69 (!) 97/52 (!) 104/57   Pulse: 81 72 86 92   Resp: 20 16 15 16   Temp: 98.2 °F (36.8 °C) 98.4 °F (36.9 °C) 98.3 °F (36.8 °C) 99.9 °F (37.7 °C)   TempSrc: Oral Oral Oral Oral   SpO2: 97% 100% 97% 97%   Weight:       Height:             Intake/Output:   Last Shift: I/O last 3 completed shifts: In: 8433 [P.O.:1370; I.V.:2551]  Out: 192 [Urine:1725; Blood:200]  Current Shift: No intake/output data recorded.   975 mL out in last 8 hrs ~ 121 mL/hr      Physical Exam:  General:  no apparent distress, alert and cooperative  Neurologic:  alert, oriented, normal speech, no focal findings or movement disorder noted  Lungs:  No increased work of breathing, good air exchange, clear to auscultation bilaterally, no crackles or wheezing  Heart:  regular rate and rhythm and no murmur    Abdomen: soft, non-distended, appropriate tenderness, no CVA tenderness, normal bowel sounds  Incision: Silver dressing in place, with no drainage  Extremities:  no calf tenderness, non edematous, SCDs on and funcitoning    Lab:  No results found for this or any previous visit (from the past 12 hour(s)).     Assessment/Plan:  Arlen Franks 39 y.o. female , POD #1 s/p IUD removal, diagnostic laparoscopy with lysis of adhesions converted to total abdominal hysterectomy with bilateral salpingectomy, left oophorectomy and right cystotomy on 4/15/21   - Doing well, vitals stable   - S/p jade catheter, voiding without difficulty UOP, adequate   - Discontinue IVF as patient is tolerating PO   - Encourage ambulation and use of incentive spirometer   - Pain control: Norco/Motrin    - Labs: CBC, BMP pending this AM    - DVT Proph: Lovenox, SCDs   - Abx: S/p Ancef x2 doses intraop, no indication for continued antibiotics   - Diet: general   - Path: pending   - Continue post-op care, please page with any questions      Diomedes Moreira DO  Ob/Gyn Resident  Pager: [phone]  2021, 7:16 AM

## 2021-04-15 NOTE — PROGRESS NOTES
Gynecology Progress Note    Date: 4/15/2021  Time: 3:12 PM    Alex Danielson 39 y.o. female , POD # 0 s/p  IUD removal, diagnostic laparoscopy with lysis of adhesions converted to total abdominal hysterectomy with bilateral salpingectomy, left oophorectomy and right cystotomy on 4/15/21    Patient seen and examined. She complained of cramping in her lower abdomen. She also reports her mouth is dry. Pain is somewhat controlled, but she would like something additional for cramping. Patient is  tolerating small sips of water by mouth. She is urinating via jade catheter. She denies any vaginal bleeding. She is not ambulating yet. She is not passing flatus. She denies Fever/Chills, Chest Pain, SOB, N/V, Calf Pain    Vitals:  Vitals:    04/15/21 1415 04/15/21 1430 04/15/21 1445 04/15/21 1500   BP:   (!) 139/93 (!) 143/85   Pulse: 77 72 78 65   Resp: 16 20 14 17   Temp:    97.6 °F (36.4 °C)   TempSrc:    Temporal   SpO2: 92% 97% 98% 98%   Weight:       Height:             Intake/Output:   Last Shift: I/O last 3 completed shifts: In: 1400 [I.V.:1400]  Out: 700 [Urine:500; Blood:200]  Current Shift: No intake/output data recorded.   100 mL out in last 2 hrs ~ 50 mL/hr      Physical Exam:  General:  no apparent distress, cooperative, appropriately drowsy from anesthesia  Neurologic:  alert, oriented, normal speech, no focal findings or movement disorder noted  Lungs:  No increased work of breathing, good air exchange, clear to auscultation bilaterally, no crackles or wheezing  Heart:  regular rate and rhythm and no murmur    Abdomen: soft, non-distended, appropriate tenderness, no CVA tenderness, hypoactive bowel sounds  Incision: Silver dressing in place- clean and dry  Extremities:  no calf tenderness, non edematous, SCDs on and functioning    Lab:  Recent Results (from the past 12 hour(s))   POCT urine pregnancy    Collection Time: 04/15/21  6:27 AM   Result Value Ref Range    HCG, Pregnancy Urine (POC) NEGATIVE NEGATIVE   TYPE AND SCREEN    Collection Time: 04/15/21  6:59 AM   Result Value Ref Range    Expiration Date 2021,2359     Arm Band Number BE [id number]     ABO/Rh O POSITIVE     Antibody Screen NEGATIVE        Assessment/Plan:  Jesse Mensah 39 y.o. female , POD #0 s/p IUD removal, diagnostic laparoscopy with lysis of adhesions converted to total abdominal hysterectomy with bilateral salpingectomy, left oophorectomy, and right ovarian cystotomy   - Doing well, BP slightly elevated, otherwise VSS   - Continue jade catheter, UOP adequate   - Continue IVF: NS @ 75ml/h   - Encourage ambulation and use of incentive spirometer   - Pain control: S/p TAP block pre operatively. Toradol/Dilaudid for pain control. Will transition to Norco/Motrin once tolerating PO.    - Labs: CBC, BMP in AM   - DVT Proph: Lovenox tomorrow AM, SCDs   - Abx: S/p Ancef preoperatively and redosed x1 during surgery. No indication for further antibiotics at this time. - Diet: general. Advance as tolerated.  Start with clear liquids   - Path: pending   - Continue home meds starting tomorrow: Celexa, Wellbutrin, Atarax, Gabapentin   - Continue post-op care, please page resident listed below with any questions      Criss Steve DO  Ob/Gyn Resident  Pager: [phone]  4/15/2021, 3:12 PM

## 2021-04-16 PROBLEM — D64.9 ACUTE ON CHRONIC ANEMIA: Status: ACTIVE | Noted: 2021-04-16

## 2021-04-16 LAB
ANION GAP SERPL CALCULATED.3IONS-SCNC: 10 MMOL/L (ref 9–17)
BUN BLDV-MCNC: 10 MG/DL (ref 6–20)
BUN/CREAT BLD: ABNORMAL (ref 9–20)
CALCIUM SERPL-MCNC: 7.2 MG/DL (ref 8.6–10.4)
CHLORIDE BLD-SCNC: 104 MMOL/L (ref 98–107)
CO2: 21 MMOL/L (ref 20–31)
CREAT SERPL-MCNC: 0.75 MG/DL (ref 0.5–0.9)
CULTURE: NO GROWTH
GFR AFRICAN AMERICAN: >60 ML/MIN
GFR NON-AFRICAN AMERICAN: >60 ML/MIN
GFR SERPL CREATININE-BSD FRML MDRD: ABNORMAL ML/MIN/{1.73_M2}
GFR SERPL CREATININE-BSD FRML MDRD: ABNORMAL ML/MIN/{1.73_M2}
GLUCOSE BLD-MCNC: 107 MG/DL (ref 70–99)
HCT VFR BLD CALC: 26.7 % (ref 36.3–47.1)
HCT VFR BLD CALC: 27.9 % (ref 36.3–47.1)
HEMOGLOBIN: 8.4 G/DL (ref 11.9–15.1)
HEMOGLOBIN: 8.6 G/DL (ref 11.9–15.1)
Lab: NORMAL
MCH RBC QN AUTO: 31.4 PG (ref 25.2–33.5)
MCHC RBC AUTO-ENTMCNC: 30.8 G/DL (ref 28.4–34.8)
MCV RBC AUTO: 101.8 FL (ref 82.6–102.9)
NRBC AUTOMATED: 0 PER 100 WBC
PDW BLD-RTO: 14.1 % (ref 11.8–14.4)
PLATELET # BLD: 344 K/UL (ref 138–453)
PMV BLD AUTO: 9 FL (ref 8.1–13.5)
POTASSIUM SERPL-SCNC: 3.4 MMOL/L (ref 3.7–5.3)
RBC # BLD: 2.74 M/UL (ref 3.95–5.11)
SODIUM BLD-SCNC: 135 MMOL/L (ref 135–144)
SPECIMEN DESCRIPTION: NORMAL
SURGICAL PATHOLOGY REPORT: NORMAL
WBC # BLD: 6.6 K/UL (ref 3.5–11.3)

## 2021-04-16 PROCEDURE — 94760 N-INVAS EAR/PLS OXIMETRY 1: CPT

## 2021-04-16 PROCEDURE — 80048 BASIC METABOLIC PNL TOTAL CA: CPT

## 2021-04-16 PROCEDURE — 2580000003 HC RX 258: Performed by: STUDENT IN AN ORGANIZED HEALTH CARE EDUCATION/TRAINING PROGRAM

## 2021-04-16 PROCEDURE — 85027 COMPLETE CBC AUTOMATED: CPT

## 2021-04-16 PROCEDURE — 6360000002 HC RX W HCPCS: Performed by: STUDENT IN AN ORGANIZED HEALTH CARE EDUCATION/TRAINING PROGRAM

## 2021-04-16 PROCEDURE — 85018 HEMOGLOBIN: CPT

## 2021-04-16 PROCEDURE — 36415 COLL VENOUS BLD VENIPUNCTURE: CPT

## 2021-04-16 PROCEDURE — 96374 THER/PROPH/DIAG INJ IV PUSH: CPT

## 2021-04-16 PROCEDURE — 1200000000 HC SEMI PRIVATE

## 2021-04-16 PROCEDURE — 85014 HEMATOCRIT: CPT

## 2021-04-16 PROCEDURE — 6370000000 HC RX 637 (ALT 250 FOR IP): Performed by: STUDENT IN AN ORGANIZED HEALTH CARE EDUCATION/TRAINING PROGRAM

## 2021-04-16 RX ORDER — 0.9 % SODIUM CHLORIDE 0.9 %
500 INTRAVENOUS SOLUTION INTRAVENOUS ONCE
Status: COMPLETED | OUTPATIENT
Start: 2021-04-16 | End: 2021-04-16

## 2021-04-16 RX ORDER — GABAPENTIN 300 MG/1
300 CAPSULE ORAL DAILY PRN
Status: DISCONTINUED | OUTPATIENT
Start: 2021-04-16 | End: 2021-04-17 | Stop reason: HOSPADM

## 2021-04-16 RX ORDER — IBUPROFEN 600 MG/1
600 TABLET ORAL EVERY 6 HOURS
Status: DISCONTINUED | OUTPATIENT
Start: 2021-04-16 | End: 2021-04-17 | Stop reason: HOSPADM

## 2021-04-16 RX ADMIN — BENZOCAINE AND MENTHOL 1 LOZENGE: 15; 3.6 LOZENGE ORAL at 21:15

## 2021-04-16 RX ADMIN — HYDROCODONE BITARTRATE AND ACETAMINOPHEN 1 TABLET: 5; 325 TABLET ORAL at 21:15

## 2021-04-16 RX ADMIN — BENZOCAINE AND MENTHOL 1 LOZENGE: 15; 3.6 LOZENGE ORAL at 06:37

## 2021-04-16 RX ADMIN — SODIUM CHLORIDE 500 ML: 9 INJECTION, SOLUTION INTRAVENOUS at 12:33

## 2021-04-16 RX ADMIN — HYDROCODONE BITARTRATE AND ACETAMINOPHEN 2 TABLET: 5; 325 TABLET ORAL at 08:43

## 2021-04-16 RX ADMIN — FAMOTIDINE 20 MG: 20 TABLET, FILM COATED ORAL at 08:44

## 2021-04-16 RX ADMIN — IBUPROFEN 600 MG: 600 TABLET, FILM COATED ORAL at 18:08

## 2021-04-16 RX ADMIN — DOCUSATE SODIUM 100 MG: 100 CAPSULE, LIQUID FILLED ORAL at 21:15

## 2021-04-16 RX ADMIN — SODIUM CHLORIDE, PRESERVATIVE FREE 10 ML: 5 INJECTION INTRAVENOUS at 21:16

## 2021-04-16 RX ADMIN — ENOXAPARIN SODIUM 40 MG: 40 INJECTION SUBCUTANEOUS at 08:45

## 2021-04-16 RX ADMIN — FAMOTIDINE 20 MG: 20 TABLET, FILM COATED ORAL at 21:15

## 2021-04-16 RX ADMIN — DOCUSATE SODIUM 100 MG: 100 CAPSULE, LIQUID FILLED ORAL at 08:44

## 2021-04-16 RX ADMIN — KETOROLAC TROMETHAMINE 30 MG: 30 INJECTION, SOLUTION INTRAMUSCULAR; INTRAVENOUS at 00:30

## 2021-04-16 RX ADMIN — POTASSIUM BICARBONATE 20 MEQ: 782 TABLET, EFFERVESCENT ORAL at 10:53

## 2021-04-16 RX ADMIN — KETOROLAC TROMETHAMINE 30 MG: 30 INJECTION, SOLUTION INTRAMUSCULAR; INTRAVENOUS at 06:35

## 2021-04-16 RX ADMIN — BENZOCAINE AND MENTHOL 1 LOZENGE: 15; 3.6 LOZENGE ORAL at 16:42

## 2021-04-16 RX ADMIN — SODIUM CHLORIDE: 9 INJECTION, SOLUTION INTRAVENOUS at 04:22

## 2021-04-16 RX ADMIN — PHENOL 1 SPRAY: 1.5 LIQUID ORAL at 08:51

## 2021-04-16 RX ADMIN — SODIUM CHLORIDE, PRESERVATIVE FREE 10 ML: 5 INJECTION INTRAVENOUS at 08:46

## 2021-04-16 RX ADMIN — HYDROCODONE BITARTRATE AND ACETAMINOPHEN 2 TABLET: 5; 325 TABLET ORAL at 04:12

## 2021-04-16 RX ADMIN — IBUPROFEN 600 MG: 600 TABLET, FILM COATED ORAL at 11:36

## 2021-04-16 RX ADMIN — BENZOCAINE AND MENTHOL 1 LOZENGE: 15; 3.6 LOZENGE ORAL at 11:00

## 2021-04-16 RX ADMIN — HYDROCODONE BITARTRATE AND ACETAMINOPHEN 2 TABLET: 5; 325 TABLET ORAL at 16:39

## 2021-04-16 ASSESSMENT — PAIN SCALES - GENERAL
PAINLEVEL_OUTOF10: 9
PAINLEVEL_OUTOF10: 7
PAINLEVEL_OUTOF10: 8
PAINLEVEL_OUTOF10: 8
PAINLEVEL_OUTOF10: 7
PAINLEVEL_OUTOF10: 8
PAINLEVEL_OUTOF10: 6

## 2021-04-16 ASSESSMENT — PAIN DESCRIPTION - PAIN TYPE: TYPE: ACUTE PAIN

## 2021-04-16 ASSESSMENT — PAIN DESCRIPTION - LOCATION: LOCATION: ABDOMEN

## 2021-04-16 ASSESSMENT — PAIN DESCRIPTION - PROGRESSION
CLINICAL_PROGRESSION: NOT CHANGED

## 2021-04-16 NOTE — PROGRESS NOTES
Gynecology Progress Note    Date: 2021  Time: 7:12 AM    Alice Nguyen 39 y.o. female , POD # 2 s/p IUD removal, diagnostic laparoscopy with lysis of adhesions converted to total abdominal hysterectomy with bilateral salpingectomy, left oophorectomy and right cystotomy on 4/15/21     Patient seen and examined. She had no complaints this AM. Pain is somewhat controlled. She states that she is sore over her incision and would like to try something additional for pain. Will switch to Percocet. Patient is tolerating oral intake. She is urinating without difficulty. She denies any vaginal bleeding. She is  ambulating without difficulty. She is  passing flatus. She denies Fever/Chills, Chest Pain, SOB, N/V, Calf Pain    Vitals:  Vitals:    21 1600 21 2109 21 2112 21 0049   BP: 111/66 (!) 90/56 (!) 98/56 (!) 93/57   Pulse: 89 94  89   Resp: 16 15  15   Temp: 98.7 °F (37.1 °C) 99.5 °F (37.5 °C)  99.6 °F (37.6 °C)   TempSrc: Oral Oral  Oral   SpO2:  95%  97%   Weight:       Height:             Intake/Output:   Last Shift: I/O last 3 completed shifts: In: 700 [P.O.:700]  Out: 2750 [Urine:2750]  Current Shift: No intake/output data recorded. 450 mL out in last 8 hrs ~ 56mL/hr      Physical Exam:  General:  no apparent distress, alert and cooperative  Neurologic:  alert, oriented, normal speech, no focal findings or movement disorder noted  Lungs:  No increased work of breathing, good air exchange, clear to auscultation bilaterally, no crackles or wheezing  Heart:  regular rate and rhythm and no murmur    Abdomen: soft, non-distended, appropriate tenderness, no CVA tenderness, normal bowel sounds  Incision: Silver bandage in place, seal intact, no drainage  Extremities:  no calf tenderness, non edematous, SCDs on and functioning    Lab:  No results found for this or any previous visit (from the past 12 hour(s)).     Assessment/Plan:  Alice Nguyen 39 y.o. female , POD #2 s/p IUD removal, diagnostic laparoscopy with lysis of adhesions converted to total abdominal hysterectomy with bilateral salpingectomy, left oophorectomy and right cystotomy on 4/15/21   - Doing well, vitals stable   - S/p jade catheter, UOP appropriate   - Encourage ambulation and use of incentive spirometer   - Pain control: Noco/Motrin. Will switch to Percocet/Motrin. - Labs: POD#1 labs reviewed and stable   - DVT Proph: Lovenox while in patient, SCDs. Discussed DVT prophylaxis at home. - Abx: S/p Ancef intraoperatively. No indication for antibiotics at this time   - Diet: Tolerating general diet   - Path: reviewed and benign findings   - Continue post-op care, please page with any questions   - Anticipate discharge home today. Med rec complete. All discharge instructions and post op restrictions reviewed with patient. All questions concerns addressed. Patient will f/u in Dr. Lala Carr office for dressing removal, wound check, and post op appointment on 4/22/21. Emre Washington DO  Ob/Gyn Resident  Pager: [phone]  4/17/2021, 7:12 AM    Attending Physician Statement  I have discussed the care of Unruly Loco, including pertinent history and exam findings with the resident. I have reviewed and edited their note in the electronic medical record. The key elements of all parts of the encounter have been performed/reviewed by me . I agree with the assessment, plan and orders as documented by the resident. The level of care submitted represents to the best of my ability the care documented in the medical record today. GC Modifier. This service has been performed in part by a resident under the direction of a teaching physician. POD #2 s/p IUD removal, diagnostic laparoscopy with lysis of adhesions converted to total abdominal hysterectomy with bilateral salpingectomy, left oophorectomy and right cystotomy on 4/15/21. Patient complaining of increased pain so will switch from Norco to Percocet. + Flatus. Plan for D/C home today and follow up in 2 weeks in office.        Attending's Name:  Shameka Rene,

## 2021-04-16 NOTE — PLAN OF CARE
Problem: Skin Integrity:  Goal: Absence of new skin breakdown  Description: Absence of new skin breakdown  4/16/2021 0517 by Antoine Humphries RN  Outcome: Met This Shift  4/15/2021 1730 by Jose Bennett RN  Outcome: Ongoing  4/15/2021 1730 by Jose Bennett RN  Outcome: Ongoing     Problem: Skin Integrity:  Goal: Will show no infection signs and symptoms  Description: Will show no infection signs and symptoms  4/16/2021 0517 by Antoine Humphries RN  Outcome: Ongoing  4/15/2021 1730 by Jose Bennett RN  Outcome: Ongoing  4/15/2021 1730 by Jose Bennett RN  Outcome: Ongoing     Problem: Pain:  Goal: Pain level will decrease  Description: Pain level will decrease  Outcome: Ongoing  Goal: Control of acute pain  Description: Control of acute pain  Outcome: Ongoing  Goal: Control of chronic pain  Description: Control of chronic pain  Outcome: Ongoing

## 2021-04-16 NOTE — PLAN OF CARE
Problem: Skin Integrity:  Goal: Will show no infection signs and symptoms  Description: Will show no infection signs and symptoms  4/16/2021 1644 by Marixa Villegas RN  Outcome: Ongoing     Problem: Pain:  Goal: Pain level will decrease  Description: Pain level will decrease  4/16/2021 1644 by Marixa Villegas RN  Outcome: Ongoing  4/16/2021 0517 by Desire Robert RN  Outcome: Ongoing     Problem: Pain:  Goal: Control of acute pain  Description: Control of acute pain  4/16/2021 1644 by Marixa Villegas RN  Outcome: Ongoing  4/16/2021 0517 by Desire Robert RN  Outcome: Ongoing

## 2021-04-16 NOTE — CARE COORDINATION
Met with pt to discuss transitional planning. She is going to her mother's house at discharge.  She does not want home care at this time

## 2021-04-16 NOTE — PROGRESS NOTES
>60 >60 mL/min    GFR Comment          GFR Staging NOT REPORTED          Dr. Minnie Thornton updated and in agreement with above plan. Please page resident below with any questions/concerns. Keith London DO  OB/GYN Resident  PGY3  Pager: [phone]  [facility], [address]  4/16/2021, 8:10 AM         Attending Physician Statement  I have discussed the care of Em Sanders, including pertinent history and exam findings,  with the resident. I have reviewed the key elements of all parts of the encounter with the resident. I agree with the assessment, plan and orders as documented by the resident. Pt has fair pain control with meds on board. Tolerating PO. Ambulating and voiding. No emesis. Hg stable, asymptomatic, will supplement with PO iron. Anticipate discharge home tomorrow.   (GE Modifier)    Electronically signed by Sima Delgado MD at 8:34 AM 04/16/21

## 2021-04-17 VITALS
TEMPERATURE: 99.6 F | WEIGHT: 235 LBS | OXYGEN SATURATION: 97 % | HEART RATE: 83 BPM | BODY MASS INDEX: 40.12 KG/M2 | SYSTOLIC BLOOD PRESSURE: 107 MMHG | HEIGHT: 64 IN | DIASTOLIC BLOOD PRESSURE: 63 MMHG | RESPIRATION RATE: 16 BRPM

## 2021-04-17 PROCEDURE — 6360000002 HC RX W HCPCS: Performed by: STUDENT IN AN ORGANIZED HEALTH CARE EDUCATION/TRAINING PROGRAM

## 2021-04-17 PROCEDURE — 6370000000 HC RX 637 (ALT 250 FOR IP): Performed by: STUDENT IN AN ORGANIZED HEALTH CARE EDUCATION/TRAINING PROGRAM

## 2021-04-17 RX ORDER — OXYCODONE HYDROCHLORIDE AND ACETAMINOPHEN 5; 325 MG/1; MG/1
2 TABLET ORAL EVERY 4 HOURS PRN
Status: DISCONTINUED | OUTPATIENT
Start: 2021-04-17 | End: 2021-04-17 | Stop reason: HOSPADM

## 2021-04-17 RX ORDER — HYDROCORTISONE 1 G/100G
1 CREAM TOPICAL ONCE
Qty: 1 ML | Refills: 0 | Status: SHIPPED | OUTPATIENT
Start: 2021-04-17 | End: 2021-04-17

## 2021-04-17 RX ORDER — OXYCODONE HYDROCHLORIDE AND ACETAMINOPHEN 5; 325 MG/1; MG/1
1 TABLET ORAL EVERY 6 HOURS PRN
Qty: 20 TABLET | Refills: 0 | Status: SHIPPED | OUTPATIENT
Start: 2021-04-17 | End: 2021-04-23 | Stop reason: SDUPTHER

## 2021-04-17 RX ORDER — OXYCODONE HYDROCHLORIDE AND ACETAMINOPHEN 5; 325 MG/1; MG/1
1 TABLET ORAL EVERY 4 HOURS PRN
Status: DISCONTINUED | OUTPATIENT
Start: 2021-04-17 | End: 2021-04-17 | Stop reason: HOSPADM

## 2021-04-17 RX ORDER — BENZOCAINE AND MENTHOL, UNSPECIFIED FORM 15; 2.3 MG/1; MG/1
1 LOZENGE ORAL
Qty: 30 LOZENGE | Refills: 0 | Status: SHIPPED | OUTPATIENT
Start: 2021-04-17 | End: 2021-05-17

## 2021-04-17 RX ADMIN — SIMETHICONE 80 MG: 80 TABLET, CHEWABLE ORAL at 12:09

## 2021-04-17 RX ADMIN — IBUPROFEN 600 MG: 600 TABLET, FILM COATED ORAL at 00:51

## 2021-04-17 RX ADMIN — IBUPROFEN 600 MG: 600 TABLET, FILM COATED ORAL at 12:08

## 2021-04-17 RX ADMIN — ENOXAPARIN SODIUM 40 MG: 40 INJECTION SUBCUTANEOUS at 08:45

## 2021-04-17 RX ADMIN — POTASSIUM BICARBONATE 20 MEQ: 782 TABLET, EFFERVESCENT ORAL at 08:44

## 2021-04-17 RX ADMIN — OXYCODONE HYDROCHLORIDE AND ACETAMINOPHEN 2 TABLET: 5; 325 TABLET ORAL at 08:52

## 2021-04-17 RX ADMIN — IBUPROFEN 600 MG: 600 TABLET, FILM COATED ORAL at 06:53

## 2021-04-17 RX ADMIN — OXYCODONE HYDROCHLORIDE AND ACETAMINOPHEN 2 TABLET: 5; 325 TABLET ORAL at 15:52

## 2021-04-17 RX ADMIN — BENZOCAINE AND MENTHOL 1 LOZENGE: 15; 3.6 LOZENGE ORAL at 07:10

## 2021-04-17 RX ADMIN — FAMOTIDINE 20 MG: 20 TABLET, FILM COATED ORAL at 08:44

## 2021-04-17 RX ADMIN — DOCUSATE SODIUM 100 MG: 100 CAPSULE, LIQUID FILLED ORAL at 08:44

## 2021-04-17 ASSESSMENT — PAIN SCALES - GENERAL
PAINLEVEL_OUTOF10: 7
PAINLEVEL_OUTOF10: 7
PAINLEVEL_OUTOF10: 6

## 2021-04-17 NOTE — PLAN OF CARE
Problem: Skin Integrity:  Goal: Absence of new skin breakdown  Description: Absence of new skin breakdown  4/17/2021 0559 by Trang Myers RN  Outcome: Met This Shift  4/16/2021 1644 by Dg Christensen RN  Outcome: Ongoing     Problem: Skin Integrity:  Goal: Will show no infection signs and symptoms  Description: Will show no infection signs and symptoms  4/17/2021 0559 by Trang Myers RN  Outcome: Ongoing  4/16/2021 1644 by Dg Christensen RN  Outcome: Ongoing     Problem: Pain:  Goal: Pain level will decrease  Description: Pain level will decrease  4/17/2021 0559 by Trang Myers RN  Outcome: Ongoing  4/16/2021 1644 by Dg Christensen RN  Outcome: Ongoing  Goal: Control of acute pain  Description: Control of acute pain  4/17/2021 0559 by Trang Myers RN  Outcome: Ongoing  4/16/2021 1644 by Dg Christensen RN  Outcome: Ongoing  Goal: Control of chronic pain  Description: Control of chronic pain  4/17/2021 0559 by Trang Myers RN  Outcome: Ongoing  4/16/2021 1644 by Dg Christensen RN  Outcome: Ongoing

## 2021-04-17 NOTE — PLAN OF CARE
Problem: Skin Integrity:  Goal: Will show no infection signs and symptoms  Description: Will show no infection signs and symptoms  4/17/2021 1604 by Trina Stevens RN  Outcome: Completed  4/17/2021 0559 by Roseanna Poe RN  Outcome: Ongoing  Goal: Absence of new skin breakdown  Description: Absence of new skin breakdown  4/17/2021 1604 by Trina Stevens RN  Outcome: Completed  4/17/2021 0559 by Roseanna Poe RN  Outcome: Met This Shift     Problem: Pain:  Goal: Pain level will decrease  Description: Pain level will decrease  4/17/2021 1604 by Trina Stevens RN  Outcome: Completed  4/17/2021 0559 by Roseanna Poe RN  Outcome: Ongoing  Goal: Control of acute pain  Description: Control of acute pain  4/17/2021 1604 by Trina Stevens RN  Outcome: Completed  4/17/2021 0559 by Roseanna Poe RN  Outcome: Ongoing  Goal: Control of chronic pain  Description: Control of chronic pain  4/17/2021 1604 by Trina Stevens RN  Outcome: Completed  4/17/2021 0559 by Roseanna Poe RN  Outcome: Ongoing

## 2021-04-17 NOTE — PROGRESS NOTES
CLINICAL PHARMACY NOTE: MEDS TO [address] Select Patient?: Yes  Total # of Prescriptions Filled: 6   The following medications were delivered to the patient:  · Percocet 5-325 mg  · Ibuprofen 600 mg  · Simethicone 80 mg  · Colace 100 mg  · zofran 4 mg otc  · Promethazine 12.5 mg  Total # of Interventions Completed: 0  Time Spent (min): 5    Additional Documentation: meds delivered to patient

## 2021-04-19 ENCOUNTER — TELEPHONE (OUTPATIENT)
Dept: OBGYN CLINIC | Age: 41
End: 2021-04-19

## 2021-04-19 ENCOUNTER — CARE COORDINATION (OUTPATIENT)
Dept: CASE MANAGEMENT | Age: 41
End: 2021-04-19

## 2021-04-19 DIAGNOSIS — Z90.710 S/P HYSTERECTOMY: Primary | ICD-10-CM

## 2021-04-19 RX ORDER — POLYETHYLENE GLYCOL 3350 17 G/17G
17 POWDER, FOR SOLUTION ORAL DAILY PRN
Qty: 510 G | Refills: 1 | Status: SHIPPED | OUTPATIENT
Start: 2021-04-19 | End: 2021-05-19

## 2021-04-19 NOTE — CARE COORDINATION
[facility] Transitions Initial Follow Up Call    Call within 2 business days of discharge: Yes    Patient: Deyvi Hamlin Patient : 1980   MRN: [id number]  Reason for Admission: AUB-L/ S/P Hysterecomy  Discharge Date: 21 RARS: Readmission Risk Score: 7      Last Discharge [facility]       Complaint Diagnosis Description Type Department Provider    4/15/21  Post-op pain Admission (Discharged) BREANNE CASTILLO ONC See-Yael So, DO           Spoke with: Raya 1894: [facility]    Was able to contact [facility] for transitional outreach. She stated that she was doing \"ok\", just having a little pain. She denied any vaginal bleeding, is voiding with difficulty and had a small hard stool today. She has not looked at her stab site and incision today. She said that she is able to get up and ambulate without difficulty. She did say that she has a slight headache today and also her throat hurt from the intubation during surgery. She said that she had some blood noted when after she brushed her teeth Reviewed side effects of narcotics and encouraged her to use a laxative if stools continue to be hard and infrequent and to drink plenty of water. Also reviewed healthy diet with protein for healing. Encouraged her also to drink cold drinks and try ice pack to throat area. Encouraged her to return to ED if she experiences any shortness of breath    Medications reviewed and all medications in the home in except the Cepacol. 1111F order completed. No home care at discharge. She is staying with her mother. She has her post op visit scheduled. Appointment noted on discharge paper work for  with no time. Office call and had to leave voice mail about  visit and if an order could be sent in for the Cepacol. Pt's phone number left for contact.   Explained CTN role and she was receptive to further outreach    Non-face-to-face services provided:  Scheduled appointment with Specialist- post op OB/GYN  Obtained and reviewed discharge summary and/or continuity of care documents  Assessment and support for treatment adherence and medication management-reviewed       Was this an external facility discharge? No Discharge Facility:     Challenges to be reviewed by the provider   Additional needs identified to be addressed with provider Yes and surgeon office  medications-cepacol  and appointment clarification             Method of communication with provider : phone    Discussed COVID-19 related testing which was available at this time. Test results were negative. Patient informed of results, if available? No    Advance Care Planning:   Does patient have an Advance Directive:  reviewed and current. Was this a readmission? No  Patient stated reason for admission: surgery  Patients top risk factors for readmission: lack of knowledge about disease    Care Transition Nurse (CTN) contacted the patient by telephone to perform post hospital discharge assessment. Verified name and  with patient as identifiers. Provided introduction to self, and explanation of the CTN role. CTN reviewed discharge instructions, medical action plan and red flags with patient who verbalized understanding. Patient given an opportunity to ask questions and does not have any further questions or concerns at this time. Were discharge instructions available to patient? Yes. Reviewed appropriate site of care based on symptoms and resources available to patient including: PCP, Specialist and When to call 911. The patient agrees to contact the PCP office for questions related to their healthcare. Medication reconciliation was performed with patient, who verbalizes understanding of administration of home medications. Advised obtaining a 90-day supply of all daily and as-needed medications. Covid Risk Education    Patient has following risk factors of: no known risk factors.    Education provided regarding infection prevention,

## 2021-04-19 NOTE — TELEPHONE ENCOUNTER
Pt called she just had surgery Thursday with So she is requesting something to help because her throat is still sore.  And she wants to know if she can get miralax as well

## 2021-04-19 NOTE — ADT AUTH CERT
Utilization Reviews       Hysterectomy, Abdominal - Care Day 2 (2021) by Lesly Wilson RN       Review Status Review Entered   Completed 2021 09:15      Criteria Review      Care Day: 2 Care Date: 2021 Level of Care: Inpatient Floor    Guideline Day 2    Level Of Care    (X) Floor    2021 9:14 AM EDT by Bruno Nelson      MEDICAL FLOOR    Clinical Status    (X) * Procedure completed    (X) * Hemodynamic stability    2021 9:15 AM EDT by Bruno Nelson      HGB DROP TO 8.6 FROM 11.1    2021 9:14 AM EDT by Bruno Nelson      21 0031  98.3 (36.8)  15  86  97/52 97% RA   21 0412  99.9 (37.7)  16  92  104/57 97% RA  21 0745  98.7 (37.1)  16  90  103/63 96% RA    Activity    (X) * Ambulate with assistance    Routes    (X) * Clear liquid diet, advance as tolerated    (X) Parenteral or oral medications    2021 9:14 AM EDT by Bruno Nelson      ibuprofen, 600 mg, Oral, Q6H  potassium bicarb-citric acid, 20 mEq, Oral, Daily  docusate sodium, 100 mg, Oral, BID  famotidine, 20 mg, Oral, BID  enoxaparin, 40 mg, Subcutaneous, Daily    Medications    (X) Discontinue prophylactic antibiotics    (X) * PCA absent [L]    2021 9:14 AM EDT by Bruno Nelson      TORADOL 30 MGIV Q 6 HR  NORCO 2 TABS X 2 DOSES    * Milestone   Additional Notes   21      ===OB GYN NOTES      Russ Iyer EPEGG 83 y.o. female , POD # 1 s/p IUD removal, diagnostic laparoscopy with lysis of adhesions converted to total abdominal hysterectomy with bilateral salpingectomy, left oophorectomy and right cystotomy on 4/15/21       Patient seen and examined. She is in good spirits this morning. She does report being sore and experiencing dry throat. Pain is controlled with current medications. She has cepacol ordered for dry throat which she says is helping.  Patient is  tolerating oral intake.  She is urinating well.  She reports any vaginal spotting the first time she urinated, but none since then. She is  ambulating with assistance.  She reports passing flatus once. She denies Fever/Chills, Chest Pain, SOB, N/V, Calf Pain.        Home meds reviewed with patient. She reports that she does not want her Wellbutrin, Celexa, Mirabegron, or Zanaflex ordered. She states she will resume Wellbutrin and Celexa once she is discharged. She states she is no longer having urinary urgency since the surgery, so she does not want to taker her Mirabegron. She reports she experiences drowsiness at time with Zanaflex, so does not want to take it with narcotics. Patient would like to continue her hydroxyzine and gabapentin PRN while inpatient.  Hydroxyzine and gabapentin ordered.       Physical Exam:   General:  no apparent distress, alert and cooperative   Neurologic:  alert, oriented, normal speech, no focal findings or movement disorder noted   Lungs:  No increased work of breathing, good air exchange, clear to auscultation bilaterally, no crackles or wheezing   Heart:  regular rate and rhythm and no murmur     Abdomen: soft, non-distended, appropriate tenderness, no CVA tenderness, normal bowel sounds   Incision: Silver dressing in place, with no drainage   Extremities:  no calf tenderness, non edematous, SCDs on and funcitoning      Assessment/Plan:   Chriss ANAYAE 81 y.o. female , POD #1 s/p IUD removal, diagnostic laparoscopy with lysis of adhesions converted to total abdominal hysterectomy with bilateral salpingectomy, left oophorectomy and right cystotomy on 4/15/21               - Doing well, vitals stable               - S/p jade catheter, voiding without difficulty UOP, adequate               - Discontinue IVF as patient is tolerating PO               - Encourage ambulation and use of incentive spirometer               - Pain control: Norco/Motrin                - Labs: CBC, BMP pending this AM                - DVT Proph: Lovenox, SCDs               - Abx: S/p Ancef x2 doses intraop, no indication for continued antibiotics               - Diet: general               - Path: pending               - Continue post-op care, please page with any questions       AM labs reviewed. Hgb dropped from 11.1 to 8.6. Patient denies all s/s of anemia. Her vital signs remain stable. Physical exam shows no evidence of hemoperitoneum. Patient knows to inform staff if she experiences any s/s of anemia. Will still give Lovenox for DVT prophylaxis as benefit of Lovenox for DVT prophylaxis outweighs potential risks at this time. She reports that she has iron supplementation at home and will start at that time. She prefers not to start iron supplementation in the hospital due to side effect of constipation. BMP showed potassium of 3.4. Will give oral potassium supplementation. Labs reviewed with patient. All questions/concerns addressed.           Results for Karla Chau (MRN [id number]) as of 2021 09:16      2021 06:26   Sodium: 135   Potassium: 3.4 (L)   Chloride: 104   CO2: 21   BUN: 10   Creatinine: 0.75   Bun/Cre Ratio: NOT REPORTED   Anion Gap: 10   GFR Non-: >60   GFR African American: >60   Glucose: 107 (H)   Calcium: 7.2 (L)   GFR Comment: Pend   GFR Staging: NOT REPORTED   WBC: 6.6   RBC: 2.74 (L)   Hemoglobin Quant: 8.6 (L)   Hematocrit: 27.9 (L)   MCV: 101.8   MCH: 31.4   MCHC: 30.8   MPV: 9.0   RDW: 14.1   Platelet Count: 071   NRBC Automated: 0.0         PA LETTER OF RECOMMENDATION by Marian Trujillo RN       Review Status Review Entered   In Primary 2021 09:07      Criteria Review          Name: Ashwini Kuo            : 1980        CSN: [id number]        INSURANCE: [address]        -----------------------------------------------------------------------------       [facility] Physician Advisor Recommendation               Based on the clinical acuity, complexity, hospital course, data review and physician/consultant impressions and findings noted below it is our recommendation to keep patient in INPATIENT status.       ---------------------------------------------------------------------------------       Summary of impressions and findings:                Clinical summary - 39 y.o. with AUB-L admitted for Lap hyst > converted to open with IUD removal, diagnostic laparoscopy with lysis of adhesions converted to total abdominal hysterectomy with bilateral salpingectomy and left oophorectomy, right ovarian cystotomy. IV pain meds. VS hypotension 97/52. Hgb dropped from 11.1 to 8.6. K 3.4.       Vitals -        Labs and imaging -       MCG criteria -        Comments - Keep patient as Inpatient status.  More extensive surgery than anticipated.               Chart reviewed and VUR notified.               Lamar Shipley MD MPH       Physician [address] [facility]        Additional Notes

## 2021-04-19 NOTE — TELEPHONE ENCOUNTER
She can buy over the counter throat lozenges or Cepacol spray. There is nothing for us to prescribe. I will send in miralax though.   Thanks

## 2021-04-22 ENCOUNTER — PATIENT MESSAGE (OUTPATIENT)
Dept: OBGYN CLINIC | Age: 41
End: 2021-04-22

## 2021-04-22 NOTE — TELEPHONE ENCOUNTER
From: Perry Zamorano  To: Adin Salas, DO  Sent: 4/22/2021 11:23 AM EDT  Subject: Prescription Question    Good Morning. I wanted to know if you would please give me a refill of pain meds until my appointment with you, on the 28th of April? ?

## 2021-04-23 DIAGNOSIS — G89.18 POST-OP PAIN: ICD-10-CM

## 2021-04-23 RX ORDER — OXYCODONE HYDROCHLORIDE AND ACETAMINOPHEN 5; 325 MG/1; MG/1
1 TABLET ORAL EVERY 6 HOURS PRN
Qty: 20 TABLET | Refills: 0 | Status: SHIPPED | OUTPATIENT
Start: 2021-04-23 | End: 2021-04-28

## 2021-04-23 NOTE — TELEPHONE ENCOUNTER
Spoke with patient regarding pain she states her pain is a 6/10 and she is taking her pain medication every 6 hours alternating with ibuprofen.

## 2021-04-27 ENCOUNTER — CARE COORDINATION (OUTPATIENT)
Dept: CASE MANAGEMENT | Age: 41
End: 2021-04-27

## 2021-04-28 ENCOUNTER — OFFICE VISIT (OUTPATIENT)
Dept: OBGYN CLINIC | Age: 41
End: 2021-04-28

## 2021-04-28 VITALS
BODY MASS INDEX: 39.27 KG/M2 | DIASTOLIC BLOOD PRESSURE: 81 MMHG | HEART RATE: 86 BPM | HEIGHT: 64 IN | WEIGHT: 230 LBS | SYSTOLIC BLOOD PRESSURE: 117 MMHG

## 2021-04-28 DIAGNOSIS — Z90.710 S/P HYSTERECTOMY: ICD-10-CM

## 2021-04-28 DIAGNOSIS — Z09 POSTOPERATIVE EXAMINATION: Primary | ICD-10-CM

## 2021-04-28 PROCEDURE — 99024 POSTOP FOLLOW-UP VISIT: CPT | Performed by: OBSTETRICS & GYNECOLOGY

## 2021-04-28 NOTE — PROGRESS NOTES
Operative Findings:  UTERUS, BILATERAL FALLOPIAN TUBES, LEFT OVARY,  CERVIX  Operation Performed:  TOTAL LAPAROSCOPIC HYSTERECTOMY, BILATERAL  SALPINGECTOMY CONVERSION TO OPEN, RIGHT OVARIAN CYSTOTOMY, LYSIS OF  ADHESIONS, IUD REMOVAL, LEFT OOPHORECTOMY    Source of Specimen  1: UTERUS, BILATERAL FALLOPIAN TUBES, LEFT OVARY, CERVIX    Gross Description  \"ROGER BABIN, UTERUS, BILATERAL FALLOPIAN TUBES, LEFT OVARY, CERVIX\"  Supracervically amputated uterine body with attached bilateral  fallopian tubes and left ovary, separately received cervix and a  separately received ovoid portion of multi lobulated tan tissue  consistent with a leiomyoma. Dimensions:  Uterine body 10.3 x 10.2 x 6.8 cm, cervix 3.5 cm in  length x 3.2 cm in diameter and the separately received leiomyoma 4.8  x 4.2 x 3.5 cm. Weight:  Uterine body, separately received cervix and leiomyoma is  586.24 grams, left fallopian tube and ovary 10.63 grams, right  fallopian tube 2.28 grams. Serosa:  Pink-tan showing multiple adhesions, bulging subserosal  nodule within the left paracervical/parametrial region measuring 7.5  cm in greatest dimension and a recent myomectomy site within the  posterior right lower uterine segment region measuring 5.5 x 3.5 cm. .  Cervix:  The cervix is 3.3 x 2.2 cm, unremarkable with a patent os. Endometrium:  The cavity is 5.7 x 4.5 cm, velvety, pink-tan surface  and averages 0.1 cm in thickness. Myometrium:  The myometrium has a maximum thickness of 5.5 cm and  shows multiple ovoid well-delineated rubbery white to tan-yellow  nodules ranging from 0.5 to 1.5 cm in greatest dim ension. The  previously noted subserosal nodule shows a whorled tan-yellow cut  surface. Tubes/ovaries: The 3.8 x 0.4 cm left fallopian tube is intact,  torturous, pink-tan, fimbriated at one end with a patent pinpoint  lumen. The fallopian tube is adhesed to a 3.7 x 2.5 x 1.5 cm  multilobulated tan ovary.   The exterior of the ovary is  >60 >60 mL/min    GFR Comment          GFR Staging NOT REPORTED    HEMOGLOBIN AND HEMATOCRIT, BLOOD   Result Value Ref Range    Hemoglobin 8.4 (L) 11.9 - 15.1 g/dL    Hematocrit 26.7 (L) 36.3 - 47.1 %   POCT urine pregnancy   Result Value Ref Range    HCG, Pregnancy Urine (POC) NEGATIVE NEGATIVE   TYPE AND SCREEN   Result Value Ref Range    Expiration Date 2021,2359     Arm Band Number [id number]     ABO/Rh O POSITIVE     Antibody Screen NEGATIVE            Assessment:      Diagnosis Orders   1. Postoperative examination     2. Dx laparoscopy w SANDIP converted to LIONEL, BS, LO 4/15/21          Patient Active Problem List    Diagnosis Date Noted    Acute on chronic anemia 2021    Dx laparoscopy w SANDIP converted to LIONEL, BS, LO 4/15/21 04/15/2021    Post-op pain     H/O recurrent BV 2020     With prior IUD  Plan for BV prophylaxis with new Mirena      Urge urinary incontinence 2020    Injury of right rotator cuff 2020    Fibroids 2020    Iron deficiency anemia due to chronic blood loss 2020     On iron supplementation      Abnormal uterine bleeding (AUB) 2017: hgb was 9.6, A1c 6.1, and TSH 1.37  20 ultrasound with 14cm fibroid uterus (largest 6cm fibroid)  20 EMB neg  Mirena IUD placed 20      Obesity, Class II, BMI 35-39.9 2016    Hx of  section 2016         Procedure: IUD removal, diagnostic laparoscopy with SANDIP converted to [facility] and left oophorectomy, right ovarian cystotomy on 4/15/21  Stable  Pathology reviewed and found to be benign. Yes    Plan:  Reviewed intraoperative findings and surgical course. Continue with restrictions. Pelvic rest. No lifting or intercourse. No baths or pools. No douching or tampons. Return in about 4 weeks (around 2021) for 6wk postop.       Sherryle Hammed So, DO [facility] [place]  2021 11:12 AM

## 2021-05-05 ENCOUNTER — CARE COORDINATION (OUTPATIENT)
Dept: CASE MANAGEMENT | Age: 41
End: 2021-05-05

## 2021-05-05 NOTE — CARE COORDINATION
[facility] Trans[facility] Follow Up Call    2021    Patient: Tawana Ovalle  Patient : 1980   MRN: <[id number]>  Reason for Admission: Post-op painAUB-L/ S/P Hysterecomy    Discharge Date: 21 RARS: Readmission Risk Score: 7         Spoke with: Victoria Rosangela states she is doing \"fine\"  Pt. States she has minimal vaginal bleeding and minor pain. incision has healed . Takes Motrin PRN for pain with effect. Takes all other medications as directed. No new issues or concerns. Reviewed next appt with OB/GYN. Pt. States she does not drive but she does have a ride. Needs to be reviewed by the provider   Additional needs identified to be addressed with provider No  none           Method of communication with provider : none      Care Transition Nurse (CTN) contacted the patient by telephone to follow up after admission on 4/15/21 Verified name and  with patient as identifiers. Addressed changes since last contact: symptom management-s/p hysterectomy  Discharged needs reviewed: none  Follow up appointment completed? Yes    Advance Care Planning:   Does patient have an Advance Directive:  reviewed and current. CTN reviewed discharge instructions, medical action plan and red flags with patient and discussed any barriers to care and/or understanding of plan of care after discharge. Discussed appropriate site of care based on symptoms and resources available to patient including: PCP, Specialist, When to call 911 and [address]. The patient agrees to contact the PCP office for questions related to their healthcare. Patients top risk factors for readmission: medical condition-s/p hysterectomy  Interventions to address risk factors: Obtained and reviewed discharge summary and/or continuity of care documents    Discussed follow-up appointments. If no appointment was previously scheduled, appointment scheduling offered: No Is follow up appointment scheduled within 7 days of discharge?  Yes  Non-[facility] follow up appointment(s): n/a      Plan for follow-up call in 5-7 days based on severity of symptoms and risk factors. Plan for next call: symptom management-s/p hysterectomy  CTN provided contact information for future needs. Care Transitions Subsequent and Final Call    Subsequent and Final Calls  Care Transitions Interventions  Other Interventions:            Follow Up  Future Appointments   Date Time Provider Giovana Rocha   5/19/2021 11:15 AM See-DO Miguelangel [id number]       [address] Care Transitions Nurse   [phone]

## 2021-05-12 ENCOUNTER — CARE COORDINATION (OUTPATIENT)
Dept: CASE MANAGEMENT | Age: 41
End: 2021-05-12

## 2021-05-12 NOTE — CARE COORDINATION
[facility] Transitions Follow Up Call    2021    Patient: Maurilio Patel  Patient : 1980   MRN: [id number]  Reason for Admission: AUB-L/ S/P Hysterecomy  Discharge Date: 21 RARS: Readmission Risk Score: 7         Spoke with: Maurilio Patel     Was able to contact Melony Mcallister for transitional outreach. She stated that she was doing \"ok\". She said that she still is having some vaginal discharge of dark brown drainage, and her pain is \"uncomfortable. \". She said that she just starting having sharp pain in her abdomin when she gets up. She will discuss this at her appointment on . She said that she is eating and having normal elimination. She had concerns if she could walk and encouraged her that walking , but not vigorous exercising is good. And to consult her physician about any other activiy about about the slimming cream that she would like to resume. She had no further questions or concerns at this time. Needs to be reviewed by the provider    Additional needs identified to be addressed with provider No  none              Method of communication with provider : none     Discussed COVID-19 related testing which was available at this time. Test results were negative. Patient informed of results, if available? No     Care Transition Nurse (CTN) contacted the patient by telephone to follow up after admission on 4/15/21. Verified name and  with patient as identifiers.     Addressed changes since last contact: routine follow up  Discharged needs reviewed: none  Follow up appointment completed? No and post op 21     Advance Care Planning:   Does patient have an Advance Directive:  reviewed and current.      CTN reviewed discharge instructions, medical action plan and red flags with patient and discussed any barriers to care and/or understanding of plan of care after discharge.  Discussed appropriate site of care based on symptoms and resources available to patient including: PCP,

## 2021-05-19 ENCOUNTER — OFFICE VISIT (OUTPATIENT)
Dept: OBGYN CLINIC | Age: 41
End: 2021-05-19

## 2021-05-19 VITALS
HEART RATE: 85 BPM | WEIGHT: 227 LBS | HEIGHT: 64 IN | BODY MASS INDEX: 38.76 KG/M2 | SYSTOLIC BLOOD PRESSURE: 131 MMHG | DIASTOLIC BLOOD PRESSURE: 88 MMHG

## 2021-05-19 DIAGNOSIS — Z90.710 S/P HYSTERECTOMY: ICD-10-CM

## 2021-05-19 DIAGNOSIS — Z09 POSTOPERATIVE EXAMINATION: Primary | ICD-10-CM

## 2021-05-19 PROCEDURE — 99024 POSTOP FOLLOW-UP VISIT: CPT | Performed by: OBSTETRICS & GYNECOLOGY

## 2021-05-19 RX ORDER — METRONIDAZOLE 500 MG/1
500 TABLET ORAL 2 TIMES DAILY
Qty: 14 TABLET | Refills: 0 | Status: SHIPPED | OUTPATIENT
Start: 2021-05-19 | End: 2021-05-26

## 2021-05-19 RX ORDER — ESTRADIOL 0.1 MG/G
CREAM VAGINAL
Qty: 1 TUBE | Refills: 0 | Status: SHIPPED | OUTPATIENT
Start: 2021-05-19

## 2021-05-19 NOTE — PROGRESS NOTES
Unruly Loco  2021         Unruly Loco  Procedure: IUD removal, diagnostic laparoscopy with SANDIP converted to [facility] and left oophorectomy, right ovarian cystotomy on 4/15/21      Unruly Loco is a 39 y.o. female     The patient was seen and evaluated. She is very happy she had her surgery and feels great. Admits to occasional spotting but it is dark. Has not noted any specific odor. She denied any shortness of breath, chest pain or dizziness. She denied any nausea, vomiting, or diarrhea. There is no fever, chills, or rigors. . All of her pre-operative complaints are now resolved. Blood pressure 131/88, pulse 85, height 5' 4\" (1.626 m), weight 227 lb (103 kg), not currently breastfeeding. Gen: no acute distress, appears comfortable  Heart: regular rate and rhythm, no murmurs appreciated  Lungs: clear to auscultation bilaterally  Abd: soft, nontender, nondistended, no rebound/guarding/rigidity, incision well healed  Ext: no edema or calf tenderness  Pelvic: The speculum was inserted and the vaginal vault was inspected. There was small amount of old brown/bloody discharge as well as thin yellow discharge noted. No active bleeding or granulation tissue was identified. The left apex of the cuff appeared to have a 1cm defect in the vaginal mucosa. Stitches visualized and underlying peritoneum intact upon thorough inspection with cotton swab. Remaining cuff intact and well healed. Assessment:      Diagnosis Orders   1. Postoperative examination     2.  Dx laparoscopy w SANDIP converted to LIONEL, BS, LO 4/15/21          Patient Active Problem List    Diagnosis Date Noted    Acute on chronic anemia 2021    Dx laparoscopy w SANDIP converted to LIONEL, BS, LO 4/15/21 04/15/2021    Post-op pain     H/O recurrent BV 2020     With prior IUD  Plan for BV prophylaxis with new Mirena      Urge urinary incontinence 2020    Injury of right rotator cuff 2020    Fibroids 2020  Iron deficiency anemia due to chronic blood loss 2020     On iron supplementation      Abnormal uterine bleeding (AUB) 2017: hgb was 9.6, A1c 6.1, and TSH 1.37  20 ultrasound with 14cm fibroid uterus (largest 6cm fibroid)  20 EMB neg  Mirena IUD placed 20      Obesity, Class II, BMI 35-39.9 2016    Hx of  section 2016     Procedure: IUD removal, diagnostic laparoscopy with SANDIP converted to [facility] and left oophorectomy, right ovarian cystotomy on 4/15/21  Stable  Pathology reviewed and found to be benign. Yes    Plan:  Reviewed pelvic exam findings from today. Strongly encouraged continued strict postoperative restrictions. Rx flagyl sent for suspected BV. Recommended vaginal estrogen nightly to promote healing of vaginal cuff mucosa. All questions answered and patient vocalized understanding. Return in about 2 weeks (around 2021) for postop.       Roger Salas, DO [facility] [place]  2021 11:17 AM

## 2021-05-20 ENCOUNTER — CARE COORDINATION (OUTPATIENT)
Dept: CASE MANAGEMENT | Age: 41
End: 2021-05-20

## 2021-06-20 NOTE — PROGRESS NOTES
Jesse Mensah  2021         Jesse Mensah  Procedure: IUD removal, diagnostic laparoscopy with SANDIP converted to [facility] and left oophorectomy, right ovarian cystotomy on 4/15/21      Jesse Mensah is a 39 y.o. female     The patient was seen and evaluated. She denies any complaints. She denied any shortness of breath, chest pain or dizziness. She denied any nausea, vomiting, or diarrhea. There is no fever, chills, or rigors. The patient denies any vaginal bleeding, discharge or odor. All of her pre-operative complaints are now resolved. Blood pressure 139/80, pulse 78, temperature 96.7 °F (35.9 °C), weight 266 lb (120.7 kg), not currently breastfeeding. Gen: no acute distress, appears comfortable  Heart: regular rate and rhythm, no murmurs appreciated  Lungs: clear to auscultation bilaterally  Abd: soft, nontender, nondistended, no rebound/guarding/rigidity, incisions well healed  Ext: no edema or calf tenderness  Pelvic:  the speculum was inserted and the vaginal vault was inspected. There were no lesions or defects. No signs of granulation tissue were noted. There was no bleeding or discharge and the cuff appeared intact without any signs of infection or separation. Digital exam noted no vault defects. There was excellent length and support. The introitus was able to accommodate 2-3 fingerbreadths in width. Results for orders placed or performed during the hospital encounter of 04/15/21   Culture, Urine    Specimen: Urine, indwelling catheter   Result Value Ref Range    Specimen Description . INDWELLING CATH URINE     Special Requests NOT REPORTED     Culture NO GROWTH    Surgical Pathology   Result Value Ref Range    Surgical Pathology Report       -- Diagnosis --  CERVIX:  CHRONIC CERVICITIS. UTERUS:  ADENOMYOSIS, EXTENSIVE. INTRAMURAL AND SUBSEROSAL  LEIOMYOMATA. SEROSAL FIBROSIS. BILATERAL FALLOPIAN TUBES:  NORMAL FALLOPIAN TUBES.   LEFT OVARY:  FOCAL SEROSAL FIBROSIS. LAYNE Lan  **Electronically Signed Out**         ajb/4/16/2021       Clinical Information  Pre-op Diagnosis:  ABNORMAL UTERINE BLEEDING, UTERINE FIBROID   Operative Findings:  UTERUS, BILATERAL FALLOPIAN TUBES, LEFT OVARY,  CERVIX  Operation Performed:  TOTAL LAPAROSCOPIC HYSTERECTOMY, BILATERAL  SALPINGECTOMY CONVERSION TO OPEN, RIGHT OVARIAN CYSTOTOMY, LYSIS OF  ADHESIONS, IUD REMOVAL, LEFT OOPHORECTOMY    Source of Specimen  1: UTERUS, BILATERAL FALLOPIAN TUBES, LEFT OVARY, CERVIX    Gross Description  \"ROGER TALYA, UTERUS, BILATERAL FALLOPIAN TUBES, LEFT OVARY, CERVIX\"  Supracervically amputated uterine body with attached bilateral  fallopian tubes and left ovary, separately received cervix and a  separately received ovoid portion of multi lobulated tan tissue  consistent with a leiomyoma. Dimensions:  Uterine body 10.3 x 10.2 x 6.8 cm, cervix 3.5 cm in  length x 3.2 cm in diameter and the separately received leiomyoma 4.8  x 4.2 x 3.5 cm. Weight:  Uterine body, separately received cervix and leiomyoma is  586.24 grams, left fallopian tube and ovary 10.63 grams, right  fallopian tube 2.28 grams. Serosa:  Pink-tan showing multiple adhesions, bulging subserosal  nodule within the left paracervical/parametrial region measuring 7.5  cm in greatest dimension and a recent myomectomy site within the  posterior right lower uterine segment region measuring 5.5 x 3.5 cm. .  Cervix:  The cervix is 3.3 x 2.2 cm, unremarkable with a patent os. Endometrium:  The cavity is 5.7 x 4.5 cm, velvety, pink-tan surface  and averages 0.1 cm in thickness. Myometrium:  The myometrium has a maximum thickness of 5.5 cm and  shows multiple ovoid well-delineated rubbery white to tan-yellow  nodules ranging from 0.5 to 1.5 cm in greatest dim ension. The  previously noted subserosal nodule shows a whorled tan-yellow cut  surface. Tubes/ovaries:   The 3.8 x 0.4 cm left fallopian tube is intact,  torturous, pink-tan, fimbriated at one end with a patent pinpoint  lumen. The fallopian tube is adhesed to a 3.7 x 2.5 x 1.5 cm  multilobulated tan ovary. The exterior of the ovary is multilobulated  tan-brown without definitive adhesions or excrescences. The cut  surface of the ovary is multilobulated pink-tan and unremarkable. The  6.3 x 0.5 cm attached right fallopian tube is torturous pink-tan,  fimbriated at one end and shows a patent pinpoint lumine on  sectioning. No right ovary is attached to the specimen or noted  within the specimen container. Cassette summary:  \"A-B\" cervix, \"C-D\" contiguous, full thickness  anterior endomyometrium to serosa, \"E-F\" contiguous, full thickness  posterior endomyometrium to serosa, \"G\" smaller intramural nodules,  \"H\" subserosal nodule, \"I\" separately received leiomyoma, \"J\" fimbriae  a nd cross sections of left fallopian tube, \"K\" representative section  of left ovary, \"L\" fimbriae and cross section of right fallopian tube. tm      Microscopic Description  Microscopic examination performed. SURGICAL PATHOLOGY CONSULTATION       Patient Name: Savana Calabrese [facility] Rec: [id number]  Path Number: [id number]    [facility]  ANATOMIC PATHOLOGY  [address] [place] [address].   [facility], [address]  [phone]  Fax: [phone]     CBC   Result Value Ref Range    WBC 6.6 3.5 - 11.3 k/uL    RBC 2.74 (L) 3.95 - 5.11 m/uL    Hemoglobin 8.6 (L) 11.9 - 15.1 g/dL    Hematocrit 27.9 (L) 36.3 - 47.1 %    .8 82.6 - 102.9 fL    MCH 31.4 25.2 - 33.5 pg    MCHC 30.8 28.4 - 34.8 g/dL    RDW 14.1 11.8 - 14.4 %    Platelets 287 910 - 128 k/uL    MPV 9.0 8.1 - 13.5 fL    NRBC Automated 0.0 0.0 per 100 WBC   BASIC METABOLIC PANEL   Result Value Ref Range    Glucose 107 (H) 70 - 99 mg/dL    BUN 10 6 - 20 mg/dL    CREATININE 0.75 0.50 - 0.90 mg/dL    Bun/Cre Ratio NOT REPORTED 9 - 20    Calcium 7.2 (L) 8.6 - 10.4 mg/dL    Sodium 135 135 - 144 mmol/L placed. Return in about 1 year (around 6/21/2022) for annual or earlier prn.         Sonya Salas,    [facility] [place]  6/21/2021 9:57 AM

## 2021-06-21 ENCOUNTER — OFFICE VISIT (OUTPATIENT)
Dept: OBGYN CLINIC | Age: 41
End: 2021-06-21

## 2021-06-21 VITALS
SYSTOLIC BLOOD PRESSURE: 139 MMHG | WEIGHT: 266 LBS | BODY MASS INDEX: 45.66 KG/M2 | TEMPERATURE: 96.7 F | HEART RATE: 78 BPM | DIASTOLIC BLOOD PRESSURE: 80 MMHG

## 2021-06-21 DIAGNOSIS — Z90.710 S/P HYSTERECTOMY: ICD-10-CM

## 2021-06-21 DIAGNOSIS — Z09 POSTOPERATIVE EXAMINATION: Primary | ICD-10-CM

## 2021-06-21 DIAGNOSIS — Z12.31 ENCOUNTER FOR SCREENING MAMMOGRAM FOR MALIGNANT NEOPLASM OF BREAST: ICD-10-CM

## 2021-06-21 PROCEDURE — 99024 POSTOP FOLLOW-UP VISIT: CPT | Performed by: OBSTETRICS & GYNECOLOGY

## 2021-07-19 ENCOUNTER — TELEPHONE (OUTPATIENT)
Dept: FAMILY MEDICINE CLINIC | Age: 41
End: 2021-07-19

## 2021-07-19 NOTE — TELEPHONE ENCOUNTER
Patient needs a physical for a new   job that she starts on 8/2. She also needs a drug test done too. Please advise.  Thank you

## 2021-12-07 ENCOUNTER — TELEMEDICINE (OUTPATIENT)
Dept: FAMILY MEDICINE CLINIC | Age: 41
End: 2021-12-07
Payer: MEDICAID

## 2021-12-07 DIAGNOSIS — N62 LARGE BREASTS: ICD-10-CM

## 2021-12-07 DIAGNOSIS — F41.9 ANXIETY: Primary | ICD-10-CM

## 2021-12-07 DIAGNOSIS — E66.01 CLASS 2 SEVERE OBESITY DUE TO EXCESS CALORIES WITH SERIOUS COMORBIDITY AND BODY MASS INDEX (BMI) OF 39.0 TO 39.9 IN ADULT (HCC): ICD-10-CM

## 2021-12-07 PROCEDURE — 1036F TOBACCO NON-USER: CPT | Performed by: FAMILY MEDICINE

## 2021-12-07 PROCEDURE — G8427 DOCREV CUR MEDS BY ELIG CLIN: HCPCS | Performed by: FAMILY MEDICINE

## 2021-12-07 PROCEDURE — G8417 CALC BMI ABV UP PARAM F/U: HCPCS | Performed by: FAMILY MEDICINE

## 2021-12-07 PROCEDURE — G8484 FLU IMMUNIZE NO ADMIN: HCPCS | Performed by: FAMILY MEDICINE

## 2021-12-07 PROCEDURE — 99213 OFFICE O/P EST LOW 20 MIN: CPT | Performed by: FAMILY MEDICINE

## 2021-12-07 RX ORDER — ARIPIPRAZOLE 5 MG/1
5 TABLET ORAL DAILY
Qty: 30 TABLET | Refills: 11 | Status: SHIPPED | OUTPATIENT
Start: 2021-12-07

## 2021-12-07 ASSESSMENT — PATIENT HEALTH QUESTIONNAIRE - PHQ9
SUM OF ALL RESPONSES TO PHQ QUESTIONS 1-9: 0
SUM OF ALL RESPONSES TO PHQ9 QUESTIONS 1 & 2: 0
1. LITTLE INTEREST OR PLEASURE IN DOING THINGS: 0
2. FEELING DOWN, DEPRESSED OR HOPELESS: 0
SUM OF ALL RESPONSES TO PHQ QUESTIONS 1-9: 0
SUM OF ALL RESPONSES TO PHQ QUESTIONS 1-9: 0

## 2021-12-07 NOTE — PROGRESS NOTES
2021    TELEHEALTH EVALUATION -- Audio/Visual (During QRXZR-67 public health emergency)    HPI:    Ike White (:  1980) has requested an audio/video evaluation for the following concern(s):      There she is being seen today stating that she would really like a breast reduction her breasts are very uncomfortable she gets back pain and if she is quite miserable with them she is actively trying to lose weight she lost about 30 pounds and is still trying but she is struggling  She also states her anxiety is off the change she is nervous all the time meds that she has been taking are not necessarily helping her      Review of Systems    Prior to Visit Medications    Medication Sig Taking? Authorizing Provider   ARIPiprazole (ABILIFY) 5 MG tablet Take 1 tablet by mouth daily Yes Caren Tomlinson, DO   estradiol (ESTRACE VAGINAL) 0.1 MG/GM vaginal cream Place 1g vaginally nightly  See-Yin So, DO   ibuprofen (ADVIL;MOTRIN) 600 MG tablet Take 1 tablet by mouth every 6 hours as needed for Pain  Jewel Patience, DO   simethicone (MYLICON) 80 MG chewable tablet Take 1 tablet by mouth every 6 hours as needed for Flatulence  Diane Mark, DO   docusate sodium (COLACE) 100 MG capsule Take 1 capsule by mouth 2 times daily as needed for Constipation  Jewel Patience, DO   buPROPion (WELLBUTRIN XL) 300 MG extended release tablet Take 1 tablet by mouth every morning  Caren Tomlinson, DO   mirabegron (MYRBETRIQ) 50 MG TB24 Take 50 mg by mouth daily  Historical Provider, MD   citalopram (CELEXA) 20 MG tablet Take 1 tablet by mouth daily  Caren Tomlinson, DO   hydrOXYzine (ATARAX) 50 MG tablet Take 1 tablet by mouth every 6 hours as needed for Anxiety  See-Yin So, DO   gabapentin (NEURONTIN) 300 MG capsule Take 300 mg by mouth daily.    Historical Provider, MD   tiZANidine (ZANAFLEX) 4 MG tablet Take 1 tablet by mouth every 8 hours as needed (muscle spasm)  Cristopher Halsted, MD   fluticasone (FLONASE) 50 MCG/ACT nasal spray 2 sprays by Nasal route daily  Arnol Nguyễn MD   ferrous sulfate (LUL-KEE) 325 (65 Fe) MG tablet Take 2 tablets by mouth daily  Arnol Nguyễn MD   albuterol sulfate  (90 BASE) MCG/ACT inhaler Inhale 2 puffs into the lungs every 6 hours as needed for Wheezing  Bassam Armani Staples MD       Social History     Tobacco Use    Smoking status: Never Smoker    Smokeless tobacco: Never Used   Substance Use Topics    Alcohol use: Yes     Comment: social    Drug use: No        Allergies   Allergen Reactions    Mobic [Meloxicam]      Abdominal pain   ,   Past Medical History:   Diagnosis Date    Arthritis     BMI 40.0-44.9, adult ([facility] [address] 2016    Fibroid     Headache     Urge urinary incontinence 2020   ,   Past Surgical History:   Procedure Laterality Date     SECTION      HYSTERECTOMY N/A 4/15/2021    TOTAL LAPAROSCOPIC HYSTERECTOMY, BILATERAL SALPINGECTOMY, CONVERSION TO OPEN, RIGHT OVARIAN CYSTOTOMY, LYSIS OF ADHESIONS, IUD REMOVAL, LEFT OOPHORECTOMY performed by Adin Salas DO at [address] [place] [address], TOTAL ABDOMINAL  04/15/2021    TOTAL LAPAROSCOPIC HYSTERECTOMY, BILATERAL SALPINGECTOMY, CONVERSION TO OPEN, RIGHT OVARIAN CYSTOTOMY, LYSIS OF ADHESIONS, IUD REMOVAL, LEFT OOPHORECTOMY    INTRAUTERINE DEVICE INSERTION  2020    mirena    INTRAUTERINE DEVICE REMOVAL  04/15/2021    OVARIAN CYST REMOVAL      right   ,   Social History     Tobacco Use    Smoking status: Never Smoker    Smokeless tobacco: Never Used   Substance Use Topics    Alcohol use: Yes     Comment: social    Drug use: No   ,   Family History   Problem Relation Age of Onset    Colon Cancer Neg Hx     Breast Cancer Neg Hx     Ovarian Cancer Neg Hx     Uterine Cancer Neg Hx        PHYSICAL EXAMINATION:  [ INSTRUCTIONS:  \"[x]\" Indicates a positive item  \"[]\" Indicates a negative item  -- DELETE ALL ITEMS NOT EXAMINED]  Vital Signs: (As obtained by patient/caregiver or practitioner observation)    Blood pressure-  Heart rate-    Respiratory rate-    Temperature-  Pulse oximetry-     Constitutional: [x] Appears well-developed and well-nourished [x] No apparent distress      [] Abnormal-   Mental status  [x] Alert and awake  [x] Oriented to person/place/time [x]Able to follow commands      Eyes:  EOM    [x]  Normal  [] Abnormal-  Sclera  [x]  Normal  [] Abnormal -         Discharge [x]  None visible  [] Abnormal -    HENT:   [x] Normocephalic, atraumatic. [] Abnormal   [x] Mouth/Throat: Mucous membranes are moist.     External Ears [x] Normal  [] Abnormal-     Neck: [x] No visualized mass     Pulmonary/Chest: [x] Respiratory effort normal.  [x] No visualized signs of difficulty breathing or respiratory distress        [] Abnormal-      Musculoskeletal:   [x] Normal gait with no signs of ataxia         [x] Normal range of motion of neck        [] Abnormal-       Neurological:        [x] No Facial Asymmetry (Cranial nerve 7 motor function) (limited exam to video visit)          [x] No gaze palsy        [] Abnormal-         Skin:        [x] No significant exanthematous lesions or discoloration noted on facial skin         [] Abnormal-            Psychiatric:       [x] Normal Affect [x] No Hallucinations        [] Abnormal-     Other pertinent observable physical exam findings-     ASSESSMENT/PLAN:   Diagnosis Orders   1. Anxiety  ARIPiprazole (ABILIFY) 5 MG tablet   2. Class 2 severe obesity due to excess calories with serious comorbidity and body mass index (BMI) of 39.0 to 39.9 in [facility])     3. Large breasts        No orders of the defined types were placed in this encounter. Requested Prescriptions     Signed Prescriptions Disp Refills    ARIPiprazole (ABILIFY) 5 MG tablet 30 tablet 11     Sig: Take 1 tablet by mouth daily       Return if symptoms worsen or fail to improve. Roseanna Nathan is a 39 y.o. female being evaluated by a Virtual Visit (video visit) encounter to address concerns as mentioned above. A caregiver was present when appropriate. Due to this being a TeleHealth encounter (During WBOBP-68 public health emergency), evaluation of the following organ systems was limited: Vitals/Constitutional/EENT/Resp/CV/GI//MS/Neuro/Skin/Heme-Lymph-Imm. Pursuant to the emergency declaration under the [address] [place] [address], [address] [place] [address] and the [employer] Act, this Virtual Visit was conducted with patient's (and/or legal guardian's) consent, to reduce the patient's risk of exposure to COVID-19 and provide necessary medical care. The patient (and/or legal guardian) has also been advised to contact this office for worsening conditions or problems, and seek emergency medical treatment and/or call 911 if deemed necessary. Patient identification was verified at the start of the visit: Yes    Total time spent on this encounter: Not billed by time    Services were provided through a video synchronous discussion virtually to substitute for in-person clinic visit. Patient and provider were located at their individual homes. --Vallarie Babinski, DO on 12/7/2021 at 4:00 PM    An electronic signature was used to authenticate this note.

## 2021-12-22 ENCOUNTER — PATIENT MESSAGE (OUTPATIENT)
Dept: FAMILY MEDICINE CLINIC | Age: 41
End: 2021-12-22

## 2021-12-23 NOTE — TELEPHONE ENCOUNTER
From: Austyn Jaime  To: Dr. Wilcox Bears: 12/22/2021 4:35 PM EST  Subject: Breast Reduction Inquiry     Good afternoon Dr. Agnes Mcgill,  I found a Surgeon who will except my insurance, so I will need a referral.  The Surgeon name is Dr. Sara Slaughter  at [address], Phone [phone].

## 2021-12-29 DIAGNOSIS — N62 LARGE BREASTS: Primary | ICD-10-CM

## 2022-01-03 RX ORDER — FLUCONAZOLE 150 MG/1
150 TABLET ORAL ONCE
Qty: 2 TABLET | Refills: 0 | Status: SHIPPED | OUTPATIENT
Start: 2022-01-03 | End: 2022-01-03

## 2022-01-03 RX ORDER — METRONIDAZOLE 7.5 MG/G
GEL VAGINAL
Qty: 1 EACH | Refills: 1 | Status: SHIPPED | OUTPATIENT
Start: 2022-01-03

## 2022-01-20 NOTE — PROGRESS NOTES
[address] [facility]  [facility] - [address]  Dept: [phone]  1/21/22    Chief Complaint   Patient presents with   Liliana River is a 40 yo female who presents for STI testing. She says that she would just like testing, but isn't having any vaginal discharge or issues. She has a history of recurrent BV infections in the past.  She has a history of STIs in the past, with trichomonas in 2018. She denies any fevers/chills, and abdominal pain. She is sexually active with a single partner. Review of Systems   Constitutional: Negative for chills and fever. HENT: Negative for congestion. Respiratory: Negative for cough and shortness of breath. Cardiovascular: Negative for chest pain and palpitations. Gastrointestinal: Negative for abdominal pain. Genitourinary: Negative for dyspareunia, pelvic pain and vaginal discharge. Musculoskeletal: Negative for back pain. Neurological: Negative for dizziness and light-headedness. Psychiatric/Behavioral: The patient is not nervous/anxious. O:Blood pressure (!) 145/90, pulse 92, height 5' 4\" (1.626 m), weight 241 lb (109.3 kg), not currently breastfeeding. Gen:  Alert, no apparent distress  Abd:  Soft, nontender  Pelvic:  Normal appearing vulva and vagina. Cervix appears normal.  Moderate whitish thick discharge in the vault. No CMT      A/P:   Diagnosis Orders   1. Screening examination for STD (sexually transmitted disease)  HIV Screen    T. pallidum Ab    Chlamydia Trachomatis & Neisseria gonorrhoeae (GC) by amplified detection    VAGINITIS DNA PROBE    HERPES PROFILE     Discussed use of barrier protection for prevention of STI transmission  Discussed changing soaps and laundry detergent to non-scented  Discussed not douching  Return if symptoms worsen or fail to improve.     Veronica Diaz MD  [address] [place] [address]

## 2022-01-21 ENCOUNTER — HOSPITAL ENCOUNTER (OUTPATIENT)
Age: 42
Setting detail: SPECIMEN
Discharge: HOME | End: 2022-01-21

## 2022-01-21 ENCOUNTER — OFFICE VISIT (OUTPATIENT)
Dept: OBGYN CLINIC | Age: 42
End: 2022-01-21
Payer: MEDICAID

## 2022-01-21 VITALS
HEART RATE: 92 BPM | HEIGHT: 64 IN | WEIGHT: 241 LBS | DIASTOLIC BLOOD PRESSURE: 90 MMHG | SYSTOLIC BLOOD PRESSURE: 145 MMHG | BODY MASS INDEX: 41.15 KG/M2

## 2022-01-21 DIAGNOSIS — Z11.3 SCREENING EXAMINATION FOR STD (SEXUALLY TRANSMITTED DISEASE): Primary | ICD-10-CM

## 2022-01-21 DIAGNOSIS — Z11.3 SCREENING EXAMINATION FOR STD (SEXUALLY TRANSMITTED DISEASE): ICD-10-CM

## 2022-01-21 LAB
HIV AG/AB: NONREACTIVE
T. PALLIDUM, IGG: NONREACTIVE

## 2022-01-21 PROCEDURE — G8427 DOCREV CUR MEDS BY ELIG CLIN: HCPCS | Performed by: OBSTETRICS & GYNECOLOGY

## 2022-01-21 PROCEDURE — 1036F TOBACCO NON-USER: CPT | Performed by: OBSTETRICS & GYNECOLOGY

## 2022-01-21 PROCEDURE — G8417 CALC BMI ABV UP PARAM F/U: HCPCS | Performed by: OBSTETRICS & GYNECOLOGY

## 2022-01-21 PROCEDURE — 99213 OFFICE O/P EST LOW 20 MIN: CPT | Performed by: OBSTETRICS & GYNECOLOGY

## 2022-01-21 PROCEDURE — G8484 FLU IMMUNIZE NO ADMIN: HCPCS | Performed by: OBSTETRICS & GYNECOLOGY

## 2022-01-21 ASSESSMENT — ENCOUNTER SYMPTOMS
COUGH: 0
ABDOMINAL PAIN: 0
BACK PAIN: 0
SHORTNESS OF BREATH: 0

## 2022-01-22 DIAGNOSIS — Z11.3 SCREENING EXAMINATION FOR STD (SEXUALLY TRANSMITTED DISEASE): ICD-10-CM

## 2022-01-22 LAB
CANDIDA SPECIES, DNA PROBE: NEGATIVE
GARDNERELLA VAGINALIS, DNA PROBE: NEGATIVE
SOURCE: NORMAL
TRICHOMONAS VAGINALIS DNA: NEGATIVE

## 2022-01-24 LAB
C TRACH DNA GENITAL QL NAA+PROBE: NEGATIVE
N. GONORRHOEAE DNA: NEGATIVE
SPECIMEN DESCRIPTION: NORMAL

## 2022-01-25 LAB
HERPES SIMPLEX VIRUS 1 IGG: 4.1
HERPES SIMPLEX VIRUS 2 IGG: 0.63
HERPES TYPE 1/2 IGM COMBINED: 0.51

## 2022-02-25 ENCOUNTER — HOSPITAL ENCOUNTER (OUTPATIENT)
Dept: MAMMOGRAPHY | Age: 42
Discharge: HOME | End: 2022-02-27
Payer: MEDICAID

## 2022-02-25 DIAGNOSIS — Z12.31 ENCOUNTER FOR SCREENING MAMMOGRAM FOR MALIGNANT NEOPLASM OF BREAST: ICD-10-CM

## 2022-02-25 PROCEDURE — 77063 BREAST TOMOSYNTHESIS BI: CPT

## 2022-04-08 ENCOUNTER — HOSPITAL ENCOUNTER (OUTPATIENT)
Age: 42
Setting detail: SPECIMEN
Discharge: HOME | End: 2022-04-08
Payer: COMMERCIAL

## 2022-04-08 LAB
ABSOLUTE EOS #: 0.11 K/UL (ref 0–0.44)
ABSOLUTE IMMATURE GRANULOCYTE: <0.03 K/UL (ref 0–0.3)
ABSOLUTE LYMPH #: 1.81 K/UL (ref 1.1–3.7)
ABSOLUTE MONO #: 0.4 K/UL (ref 0.1–1.2)
ALBUMIN SERPL-MCNC: 4 G/DL (ref 3.5–5.2)
ALBUMIN/GLOBULIN RATIO: 1.3 (ref 1–2.5)
ALP BLD-CCNC: 61 U/L (ref 35–104)
ALT SERPL-CCNC: 11 U/L (ref 5–33)
ANION GAP SERPL CALCULATED.3IONS-SCNC: 9 MMOL/L (ref 9–17)
AST SERPL-CCNC: 17 U/L
BASOPHILS # BLD: 0 % (ref 0–2)
BASOPHILS ABSOLUTE: <0.03 K/UL (ref 0–0.2)
BILIRUB SERPL-MCNC: 0.25 MG/DL (ref 0.3–1.2)
BILIRUBIN URINE: NEGATIVE
BUN BLDV-MCNC: 9 MG/DL (ref 6–20)
CALCIUM SERPL-MCNC: 8.9 MG/DL (ref 8.6–10.4)
CHLORIDE BLD-SCNC: 105 MMOL/L (ref 98–107)
CHOLESTEROL, FASTING: 164 MG/DL
CHOLESTEROL/HDL RATIO: 4.3
CO2: 23 MMOL/L (ref 20–31)
COLOR: YELLOW
COMMENT UA: ABNORMAL
CREAT SERPL-MCNC: 0.69 MG/DL (ref 0.5–0.9)
EOSINOPHILS RELATIVE PERCENT: 2 % (ref 1–4)
GFR AFRICAN AMERICAN: >60 ML/MIN
GFR NON-AFRICAN AMERICAN: >60 ML/MIN
GFR SERPL CREATININE-BSD FRML MDRD: ABNORMAL ML/MIN/{1.73_M2}
GLUCOSE BLD-MCNC: 101 MG/DL (ref 70–99)
GLUCOSE URINE: NEGATIVE
HCT VFR BLD CALC: 38.4 % (ref 36.3–47.1)
HDLC SERPL-MCNC: 38 MG/DL
HEMOGLOBIN: 12.3 G/DL (ref 11.9–15.1)
IMMATURE GRANULOCYTES: 0 %
KETONES, URINE: ABNORMAL
LDL CHOLESTEROL: 109 MG/DL (ref 0–130)
LEUKOCYTE ESTERASE, URINE: NEGATIVE
LYMPHOCYTES # BLD: 28 % (ref 24–43)
MCH RBC QN AUTO: 30.9 PG (ref 25.2–33.5)
MCHC RBC AUTO-ENTMCNC: 32 G/DL (ref 28.4–34.8)
MCV RBC AUTO: 96.5 FL (ref 82.6–102.9)
MONOCYTES # BLD: 6 % (ref 3–12)
NITRITE, URINE: NEGATIVE
NRBC AUTOMATED: 0 PER 100 WBC
PDW BLD-RTO: 12.3 % (ref 11.8–14.4)
PH UA: 5.5 (ref 5–8)
PLATELET # BLD: 390 K/UL (ref 138–453)
PMV BLD AUTO: 9.2 FL (ref 8.1–13.5)
POTASSIUM SERPL-SCNC: 3.7 MMOL/L (ref 3.7–5.3)
PROTEIN UA: NEGATIVE
RBC # BLD: 3.98 M/UL (ref 3.95–5.11)
SEG NEUTROPHILS: 64 % (ref 36–65)
SEGMENTED NEUTROPHILS ABSOLUTE COUNT: 4.07 K/UL (ref 1.5–8.1)
SODIUM BLD-SCNC: 137 MMOL/L (ref 135–144)
SPECIFIC GRAVITY UA: 1.03 (ref 1–1.03)
TOTAL PROTEIN: 7.1 G/DL (ref 6.4–8.3)
TRIGLYCERIDE, FASTING: 86 MG/DL
TSH SERPL DL<=0.05 MIU/L-ACNC: 1.47 UIU/ML (ref 0.3–5)
TURBIDITY: CLEAR
URINE HGB: NEGATIVE
UROBILINOGEN, URINE: NORMAL
VITAMIN D 25-HYDROXY: 15.6 NG/ML
WBC # BLD: 6.4 K/UL (ref 3.5–11.3)

## 2022-04-08 PROCEDURE — 84443 ASSAY THYROID STIM HORMONE: CPT

## 2022-04-08 PROCEDURE — 80061 LIPID PANEL: CPT

## 2022-04-08 PROCEDURE — 82306 VITAMIN D 25 HYDROXY: CPT

## 2022-04-08 PROCEDURE — 81003 URINALYSIS AUTO W/O SCOPE: CPT

## 2022-04-08 PROCEDURE — 85025 COMPLETE CBC W/AUTO DIFF WBC: CPT

## 2022-04-08 PROCEDURE — 80053 COMPREHEN METABOLIC PANEL: CPT

## 2022-04-08 PROCEDURE — 36415 COLL VENOUS BLD VENIPUNCTURE: CPT

## 2023-02-14 NOTE — PROGRESS NOTES
[facility] - [address] [facility]  Dept: [phone]  Dept Fax: [phone]    02/15/23    Chief Complaint   Patient presents with    Other              Miguel Boxts 37 y.o. has a complaint of pain and bruising in the vagina. She also would like to have vaginal cultures. She was using a different lubricant about 1.5 weeks ago and she feels that she has had irritation since then. She also feels that it is swollen. Review of Systems   Constitutional:  Negative for chills and fever. HENT:  Negative for congestion. Respiratory:  Negative for cough. Cardiovascular:  Negative for chest pain and palpitations. Gastrointestinal:  Negative for abdominal pain. Genitourinary:  Negative for dyspareunia, pelvic pain and vaginal discharge. Musculoskeletal:  Negative for back pain. Neurological:  Negative for dizziness and light-headedness. Psychiatric/Behavioral:  The patient is not nervous/anxious. Gynecologic History  No LMP recorded (lmp unknown). Patient has had a hysterectomy.   Contraception: status post hysterectomy  Last Pap: 16  Results: normal  Last Mammogram: 22  Results: normal    Obstetric History  : 1  Para: 1  AB: 0    Past Medical History:   Diagnosis Date    Arthritis     BMI 40.0-44.9, adult ([facility] [address] 2016    Fibroid     Headache     Urge urinary incontinence 2020     Past Surgical History:   Procedure Laterality Date     SECTION      HYSTERECTOMY N/A 4/15/2021    TOTAL LAPAROSCOPIC HYSTERECTOMY, BILATERAL SALPINGECTOMY, CONVERSION TO OPEN, RIGHT OVARIAN CYSTOTOMY, LYSIS OF ADHESIONS, IUD REMOVAL, LEFT OOPHORECTOMY performed by Adin Salas DO at [address] [place] [address]  2020    mirena    INTRAUTERINE DEVICE REMOVAL  04/15/2021    OVARIAN CYST REMOVAL      right    TOTAL ABDOMINAL HYSTERECTOMY  04/15/2021    TOTAL LAPAROSCOPIC HYSTERECTOMY, BILATERAL SALPINGECTOMY, CONVERSION TO OPEN, RIGHT OVARIAN CYSTOTOMY, LYSIS OF ADHESIONS, IUD REMOVAL, LEFT OOPHORECTOMY     Allergies   Allergen Reactions    Mobic [Meloxicam]      Abdominal pain     Current Outpatient Medications   Medication Sig Dispense Refill    ARIPiprazole (ABILIFY) 5 MG tablet Take 1 tablet by mouth daily 30 tablet 11    ibuprofen (ADVIL;MOTRIN) 600 MG tablet Take 1 tablet by mouth every 6 hours as needed for Pain 120 tablet 0    buPROPion (WELLBUTRIN XL) 300 MG extended release tablet Take 1 tablet by mouth every morning 30 tablet 11    gabapentin (NEURONTIN) 300 MG capsule Take 300 mg by mouth daily. fluticasone (FLONASE) 50 MCG/ACT nasal spray 2 sprays by Nasal route daily 1 Bottle 5    ferrous sulfate (LUL-KEE) 325 (65 Fe) MG tablet Take 2 tablets by mouth daily 180 tablet 1    albuterol sulfate  (90 BASE) MCG/ACT inhaler Inhale 2 puffs into the lungs every 6 hours as needed for Wheezing 1 Inhaler 3    metroNIDAZOLE (METROGEL VAGINAL) 0.75 % vaginal gel Place vaginally 2 times daily.  (Patient not taking: No sig reported) 1 each 1    estradiol (ESTRACE VAGINAL) 0.1 MG/GM vaginal cream Place 1g vaginally nightly (Patient not taking: Reported on 1/21/2022) 1 Tube 0    simethicone (MYLICON) 80 MG chewable tablet Take 1 tablet by mouth every 6 hours as needed for Flatulence (Patient not taking: No sig reported) 180 tablet 0    docusate sodium (COLACE) 100 MG capsule Take 1 capsule by mouth 2 times daily as needed for Constipation (Patient not taking: Reported on 1/21/2022) 60 capsule 1    mirabegron (MYRBETRIQ) 50 MG TB24 Take 50 mg by mouth daily (Patient not taking: No sig reported)      citalopram (CELEXA) 20 MG tablet Take 1 tablet by mouth daily (Patient not taking: Reported on 1/21/2022) 30 tablet 11    hydrOXYzine (ATARAX) 50 MG tablet Take 1 tablet by mouth every 6 hours as needed for Anxiety (Patient not taking: No sig reported) 30 tablet 1    tiZANidine (ZANAFLEX) 4 MG tablet Take 1 tablet by mouth every 8 hours as needed (muscle spasm) (Patient not taking: No sig reported) 60 tablet 3     No current facility-administered medications for this visit. Social History     Socioeconomic History    Marital status: Single     Spouse name: Not on file    Number of children: Not on file    Years of education: Not on file    Highest education level: Not on file   Occupational History    Not on file   Tobacco Use    Smoking status: Never    Smokeless tobacco: Never   Substance and Sexual Activity    Alcohol use: Yes     Comment: social    Drug use: No    Sexual activity: Yes     Partners: Male     Birth control/protection: Condom   Other Topics Concern    Not on file   Social History Narrative    Not on file     Social Determinants of Health     Financial Resource Strain: Not on file   Food Insecurity: Not on file   Transportation Needs: Not on file   Physical Activity: Not on file   Stress: Not on file   Social Connections: Not on file   Intimate Partner Violence: Not on file   Housing Stability: Not on file     Family History   Problem Relation Age of Onset    Colon Cancer Neg Hx     Breast Cancer Neg Hx     Ovarian Cancer Neg Hx     Uterine Cancer Neg Hx        Physical exam Physical Exam  Constitutional:       Appearance: Normal appearance. She is normal weight. HENT:      Head: Normocephalic. Eyes:      Extraocular Movements: Extraocular movements intact. Neurological:      Mental Status: She is alert and oriented to person, place, and time. Psychiatric:         Mood and Affect: Mood normal.         Behavior: Behavior normal.         Thought Content: Thought content normal.         Judgment: Judgment normal.       Assessment/Plan   1. Vaginal irritation  - Likely 2/2 using a new scented lubricant since the discomfort started after that episode.   Will rule out infection as cause of irritation.   - Discussed using sitz baths, coconut oil to soothe the skin  - Vaginitis DNA Probe; Future  - Chlamydia Trachomatis & Neisseria gonorrhoeae (GC) by amplified detection;  Future    Pt to follow up PRN    Nancy Sarabia MD  [address]

## 2023-02-15 ENCOUNTER — OFFICE VISIT (OUTPATIENT)
Dept: OBGYN CLINIC | Age: 43
End: 2023-02-15
Payer: COMMERCIAL

## 2023-02-15 ENCOUNTER — HOSPITAL ENCOUNTER (OUTPATIENT)
Age: 43
Setting detail: SPECIMEN
Discharge: HOME | End: 2023-02-15

## 2023-02-15 VITALS
WEIGHT: 219 LBS | HEART RATE: 84 BPM | HEIGHT: 64 IN | SYSTOLIC BLOOD PRESSURE: 124 MMHG | BODY MASS INDEX: 37.39 KG/M2 | DIASTOLIC BLOOD PRESSURE: 79 MMHG

## 2023-02-15 DIAGNOSIS — N89.8 VAGINAL IRRITATION: ICD-10-CM

## 2023-02-15 DIAGNOSIS — N89.8 VAGINAL IRRITATION: Primary | ICD-10-CM

## 2023-02-15 LAB
CANDIDA SPECIES, DNA PROBE: NEGATIVE
GARDNERELLA VAGINALIS, DNA PROBE: POSITIVE
SOURCE: ABNORMAL
TRICHOMONAS VAGINALIS DNA: NEGATIVE

## 2023-02-15 PROCEDURE — G8427 DOCREV CUR MEDS BY ELIG CLIN: HCPCS | Performed by: OBSTETRICS & GYNECOLOGY

## 2023-02-15 PROCEDURE — G8419 CALC BMI OUT NRM PARAM NOF/U: HCPCS | Performed by: OBSTETRICS & GYNECOLOGY

## 2023-02-15 PROCEDURE — 1036F TOBACCO NON-USER: CPT | Performed by: OBSTETRICS & GYNECOLOGY

## 2023-02-15 PROCEDURE — 99213 OFFICE O/P EST LOW 20 MIN: CPT | Performed by: OBSTETRICS & GYNECOLOGY

## 2023-02-15 PROCEDURE — G8484 FLU IMMUNIZE NO ADMIN: HCPCS | Performed by: OBSTETRICS & GYNECOLOGY

## 2023-02-15 ASSESSMENT — ENCOUNTER SYMPTOMS
BACK PAIN: 0
ABDOMINAL PAIN: 0
COUGH: 0

## 2023-02-16 LAB
C TRACH DNA SPEC QL PROBE+SIG AMP: NEGATIVE
N GONORRHOEA DNA SPEC QL PROBE+SIG AMP: NEGATIVE
SPECIMEN DESCRIPTION: NORMAL

## 2023-02-16 RX ORDER — FLUCONAZOLE 150 MG/1
150 TABLET ORAL ONCE
Qty: 1 TABLET | Refills: 0 | Status: SHIPPED | OUTPATIENT
Start: 2023-02-16 | End: 2023-02-16

## 2023-02-16 RX ORDER — METRONIDAZOLE 500 MG/1
500 TABLET ORAL 2 TIMES DAILY
Qty: 14 TABLET | Refills: 0 | Status: SHIPPED | OUTPATIENT
Start: 2023-02-16 | End: 2023-02-23

## 2023-02-16 NOTE — RESULT ENCOUNTER NOTE
Please let pt know she is positive for BV - flagyl was sent in for her. She should take it twice a day for a week. Please have her take it on a full stomach since it can make her nauseated. I also sent in a diflucan for her since the flagyl gives her a yeast infection.   Thanks

## 2023-05-26 ENCOUNTER — HOSPITAL ENCOUNTER (OUTPATIENT)
Age: 43
Setting detail: SPECIMEN
Discharge: HOME | End: 2023-05-26

## 2023-05-26 DIAGNOSIS — N89.8 VAGINAL IRRITATION: ICD-10-CM

## 2023-05-26 LAB
HAV IGM SERPL QL IA: NONREACTIVE
HBV CORE IGM SERPL QL IA: NONREACTIVE
HBV SURFACE AG SERPL QL IA: NONREACTIVE
HCV AB SERPL QL IA: NONREACTIVE
HIV 1+2 AB+HIV1 P24 AG SERPL QL IA: NONREACTIVE
T PALLIDUM AB SER QL IA: NONREACTIVE

## 2023-07-07 ENCOUNTER — TELEPHONE (OUTPATIENT)
Dept: OBGYN CLINIC | Age: 43
End: 2023-07-07

## 2023-07-07 NOTE — TELEPHONE ENCOUNTER
GYN pt last seen 2/15/23    Has had 2 hospital encounters since due to vaginal discharge and irritation. Pt requesting appt as she has taken otc medication with no relief and now has an appt scheduled to be seen for the issues.

## 2023-07-11 ENCOUNTER — OFFICE VISIT (OUTPATIENT)
Dept: OBGYN CLINIC | Age: 43
End: 2023-07-11
Payer: COMMERCIAL

## 2023-07-11 VITALS
HEART RATE: 86 BPM | BODY MASS INDEX: 37.22 KG/M2 | HEIGHT: 64 IN | WEIGHT: 218 LBS | SYSTOLIC BLOOD PRESSURE: 132 MMHG | DIASTOLIC BLOOD PRESSURE: 88 MMHG

## 2023-07-11 DIAGNOSIS — N94.10 DYSPAREUNIA IN FEMALE: ICD-10-CM

## 2023-07-11 DIAGNOSIS — N89.8 VAGINAL IRRITATION: Primary | ICD-10-CM

## 2023-07-11 PROCEDURE — G8427 DOCREV CUR MEDS BY ELIG CLIN: HCPCS | Performed by: OBSTETRICS & GYNECOLOGY

## 2023-07-11 PROCEDURE — 1036F TOBACCO NON-USER: CPT | Performed by: OBSTETRICS & GYNECOLOGY

## 2023-07-11 PROCEDURE — G8417 CALC BMI ABV UP PARAM F/U: HCPCS | Performed by: OBSTETRICS & GYNECOLOGY

## 2023-07-11 PROCEDURE — 99213 OFFICE O/P EST LOW 20 MIN: CPT | Performed by: OBSTETRICS & GYNECOLOGY

## 2023-07-11 SDOH — ECONOMIC STABILITY: TRANSPORTATION INSECURITY
IN THE PAST 12 MONTHS, HAS LACK OF TRANSPORTATION KEPT YOU FROM MEETINGS, WORK, OR FROM GETTING THINGS NEEDED FOR DAILY LIVING?: NO

## 2023-07-11 SDOH — ECONOMIC STABILITY: FOOD INSECURITY: WITHIN THE PAST 12 MONTHS, THE FOOD YOU BOUGHT JUST DIDN'T LAST AND YOU DIDN'T HAVE MONEY TO GET MORE.: SOMETIMES TRUE

## 2023-07-11 SDOH — ECONOMIC STABILITY: HOUSING INSECURITY
IN THE LAST 12 MONTHS, WAS THERE A TIME WHEN YOU DID NOT HAVE A STEADY PLACE TO SLEEP OR SLEPT IN A SHELTER (INCLUDING NOW)?: NO

## 2023-07-11 SDOH — ECONOMIC STABILITY: INCOME INSECURITY: HOW HARD IS IT FOR YOU TO PAY FOR THE VERY BASICS LIKE FOOD, HOUSING, MEDICAL CARE, AND HEATING?: SOMEWHAT HARD

## 2023-07-11 SDOH — ECONOMIC STABILITY: FOOD INSECURITY: WITHIN THE PAST 12 MONTHS, YOU WORRIED THAT YOUR FOOD WOULD RUN OUT BEFORE YOU GOT MONEY TO BUY MORE.: SOMETIMES TRUE

## 2023-07-11 ASSESSMENT — PATIENT HEALTH QUESTIONNAIRE - PHQ9
SUM OF ALL RESPONSES TO PHQ9 QUESTIONS 1 & 2: 0
SUM OF ALL RESPONSES TO PHQ9 QUESTIONS 1 & 2: 0
SUM OF ALL RESPONSES TO PHQ QUESTIONS 1-9: 0
SUM OF ALL RESPONSES TO PHQ QUESTIONS 1-9: 0
2. FEELING DOWN, DEPRESSED OR HOPELESS: 0
SUM OF ALL RESPONSES TO PHQ QUESTIONS 1-9: 0
SUM OF ALL RESPONSES TO PHQ QUESTIONS 1-9: 0
2. FEELING DOWN, DEPRESSED OR HOPELESS: NOT AT ALL
1. LITTLE INTEREST OR PLEASURE IN DOING THINGS: NOT AT ALL
1. LITTLE INTEREST OR PLEASURE IN DOING THINGS: 0

## 2023-07-11 ASSESSMENT — ENCOUNTER SYMPTOMS
BACK PAIN: 0
COUGH: 0
SHORTNESS OF BREATH: 0
ABDOMINAL PAIN: 0

## 2023-07-11 NOTE — PROGRESS NOTES
[facility]  [facility] - [address]  Dept: [phone]  7/11/23    Chief Complaint   Patient presents with    Other              Dawna Plummer is a 38 yo female who presents for vaginal irritation. She says that she went to urgent care 7/7 and all of her swabs were negative. She was on antibiotics for a UTI and so they gave her 2 diflucan and that helped. She says that she is still having issues though. She has a history of recurrent BV infections in the past.  She has a history of STIs in the past.  She denies any fevers/chills, and abdominal pain. She is sexually active. She had some bleeding after sex and that concerned her. She does have some dyspareunia in certain positions, especially \"doggy style. \"  Other positions are fine however most of the time. Review of Systems   Constitutional:  Negative for chills and fever. HENT:  Negative for congestion. Respiratory:  Negative for cough and shortness of breath. Cardiovascular:  Negative for chest pain and palpitations. Gastrointestinal:  Negative for abdominal pain. Genitourinary:  Positive for dyspareunia. Negative for pelvic pain and vaginal discharge. Musculoskeletal:  Negative for back pain. Neurological:  Negative for dizziness and light-headedness. Psychiatric/Behavioral:  The patient is not nervous/anxious. O:Blood pressure 132/88, pulse 86, height 5' 4\" (1.626 m), weight 218 lb (98.9 kg), not currently breastfeeding. Gen:  Alert, no apparent distress  Abd:  Soft, nontender  Pelvic:  Normal appearing vulva. Vaginal cuff and rest of vagina without any irritation. Small amount of whitish physiologic discharge noted. A/P:   Diagnosis Orders   1. Vaginal irritation        2.  Dyspareunia in female  [facility] [place]        Will try pelvic floor PT to see if that will help with the dyspareunia in

## 2025-04-08 ENCOUNTER — OFFICE VISIT (OUTPATIENT)
Dept: OBGYN CLINIC | Age: 45
End: 2025-04-08
Payer: MEDICAID

## 2025-04-08 VITALS
HEART RATE: 91 BPM | WEIGHT: 227 LBS | SYSTOLIC BLOOD PRESSURE: 123 MMHG | DIASTOLIC BLOOD PRESSURE: 89 MMHG | BODY MASS INDEX: 38.96 KG/M2

## 2025-04-08 DIAGNOSIS — D50.0 IRON DEFICIENCY ANEMIA DUE TO CHRONIC BLOOD LOSS: ICD-10-CM

## 2025-04-08 DIAGNOSIS — Z01.419 ENCOUNTER FOR WELL WOMAN EXAM: Primary | ICD-10-CM

## 2025-04-08 PROCEDURE — 99396 PREV VISIT EST AGE 40-64: CPT | Performed by: STUDENT IN AN ORGANIZED HEALTH CARE EDUCATION/TRAINING PROGRAM

## 2025-04-08 RX ORDER — KETOTIFEN FUMARATE 0.35 MG/ML
1 SOLUTION/ DROPS OPHTHALMIC DAILY
Qty: 10 ML | Refills: 5 | Status: SHIPPED | OUTPATIENT
Start: 2025-04-08

## 2025-04-08 RX ORDER — CHOLECALCIFEROL (VITAMIN D3) 1250 MCG
TABLET ORAL
COMMUNITY
End: 2025-04-08 | Stop reason: SDUPTHER

## 2025-04-08 RX ORDER — HYDROXYZINE PAMOATE 25 MG/1
25 CAPSULE ORAL 2 TIMES DAILY
COMMUNITY
Start: 2025-01-09

## 2025-04-08 RX ORDER — FERROUS SULFATE 325(65) MG
2 TABLET ORAL DAILY
Qty: 180 TABLET | Refills: 2 | Status: SHIPPED | OUTPATIENT
Start: 2025-04-08

## 2025-04-08 RX ORDER — LORATADINE 10 MG/1
10 TABLET ORAL
COMMUNITY
Start: 2024-07-05

## 2025-04-08 RX ORDER — CHOLECALCIFEROL (VITAMIN D3) 1250 MCG
1 TABLET ORAL WEEKLY
Qty: 60 TABLET | Refills: 11 | Status: SHIPPED | OUTPATIENT
Start: 2025-04-08

## 2025-04-08 RX ORDER — KETOTIFEN FUMARATE 0.35 MG/ML
1 SOLUTION/ DROPS OPHTHALMIC
COMMUNITY
End: 2025-04-08 | Stop reason: SDUPTHER

## 2025-04-08 SDOH — ECONOMIC STABILITY: FOOD INSECURITY: WITHIN THE PAST 12 MONTHS, YOU WORRIED THAT YOUR FOOD WOULD RUN OUT BEFORE YOU GOT MONEY TO BUY MORE.: NEVER TRUE

## 2025-04-08 SDOH — ECONOMIC STABILITY: FOOD INSECURITY: WITHIN THE PAST 12 MONTHS, THE FOOD YOU BOUGHT JUST DIDN'T LAST AND YOU DIDN'T HAVE MONEY TO GET MORE.: NEVER TRUE

## 2025-04-08 NOTE — PROGRESS NOTES
[facility] Annual Visit    Jacob Correia  2025                       Primary Care Physician: Caren Tomlinson DO    CC:   Chief Complaint   Patient presents with    Annual Exam     Last pap ..16 WNL HPV neg  Hyst in   Last martha 2.25.22 WNL Birads1          HPI: Jacob Correia is a 45 y.o. female     The patient was seen and examined. She is here for an annual visit. She is has no complaints today.     No LMP recorded (lmp unknown). Patient has had a hysterectomy..     She is not sexually active and has no sexual health concerns.     REVIEW OF SYSTEMS:   Constitutional: negative fever, negative chills   HEENT: negative visual disturbances, negative headaches  Respiratory: negative dyspnea, negative cough  Cardiovascular: negative chest pain,  negative palpitations  Gastrointestinal: negative abdominal pain, negative RUQ pain, negative N/V, negative diarrhea, negative constipation  Genitourinary: negative dysuria, negative vaginal discharge  Dermatological: negative rash  Hematologic: negative bruising  Immunologic/Lymphatic: negative recent illness, negative recent sick contact  Musculoskeletal: negative back pain, negative myalgias, negative arthralgias  Neurological:  negative dizziness, negative weakness  Behavior/Psych: negative depression, negative anxiety    GYNECOLOGICAL HISTORY:  Age of Menopause: N/A    Sexually Active: not currently   STD History: denies recent   Pap History:Last pap ..16 WNL HPV neg  Hyst in   Colposcopy History: denies   Permanent Sterilization: yes - hyst  Reversible Birth Control: no  Hormone Replacement Exposure: N/A     OBSTETRICAL HISTORY:  OB History    Para Term  AB Living   1 1 1 0 0 1   SAB IAB Ectopic Molar Multiple Live Births   0 0 0 0 0 1      # Outcome Date GA Lbr Yfn/2nd Weight Sex Type Anes PTL Lv   1 Term  40w0d  3.232 kg (7 lb 2 oz) F CS-Unspec   SYLVIA      Complications: Dysfunctional Labor       PAST MEDICAL HISTORY:   has

## 2025-04-30 ENCOUNTER — OFFICE VISIT (OUTPATIENT)
Age: 45
End: 2025-04-30

## 2025-04-30 VITALS
RESPIRATION RATE: 18 BRPM | DIASTOLIC BLOOD PRESSURE: 87 MMHG | OXYGEN SATURATION: 98 % | HEIGHT: 64 IN | TEMPERATURE: 97.9 F | BODY MASS INDEX: 39.27 KG/M2 | HEART RATE: 81 BPM | WEIGHT: 230 LBS | SYSTOLIC BLOOD PRESSURE: 138 MMHG

## 2025-04-30 DIAGNOSIS — R03.0 ELEVATED BLOOD PRESSURE READING: ICD-10-CM

## 2025-04-30 DIAGNOSIS — N89.8 VAGINAL DISCHARGE: ICD-10-CM

## 2025-04-30 DIAGNOSIS — N93.9 VAGINAL BLEEDING: Primary | ICD-10-CM

## 2025-04-30 RX ORDER — METRONIDAZOLE 500 MG/1
500 TABLET ORAL 2 TIMES DAILY
Qty: 14 TABLET | Refills: 0 | Status: SHIPPED | OUTPATIENT
Start: 2025-04-30 | End: 2025-05-07

## 2025-04-30 ASSESSMENT — ENCOUNTER SYMPTOMS
BACK PAIN: 0
VOMITING: 0
NAUSEA: 0
GASTROINTESTINAL NEGATIVE: 1
ABDOMINAL PAIN: 0
RESPIRATORY NEGATIVE: 1

## 2025-04-30 NOTE — PROGRESS NOTES
[facility] [place] [address]  Phone: [phone]  Fax: [phone]      Jacob Correia  1980  MRN: [id number]  Date of visit: 2025    Chief Complaint:     Jacob Correia (:  1980) is a 45 y.o. female,New patient, here for evaluation of the following chief complaint(s):  Vaginal Bleeding (X yesterday and had a hysterectomy and now is spotting after intercourse )      Allergies   Allergen Reactions    Mobic [Meloxicam]      Abdominal pain        Current Outpatient Medications   Medication Sig Dispense Refill    metroNIDAZOLE (FLAGYL) 500 MG tablet Take 1 tablet by mouth 2 times daily for 7 days 14 tablet 0    hydrOXYzine pamoate (VISTARIL) 25 MG capsule Take 1 capsule by mouth 2 times daily      loratadine (CLARITIN) 10 MG tablet Take 1 tablet by mouth      DIALYVITE VITAMIN D3 MAX 1.25 MG (16672 UT) TABS Take 1 tablet by mouth once a week 60 tablet 11    ferrous sulfate (LUL-KEE) 325 (65 Fe) MG tablet Take 2 tablets by mouth daily 180 tablet 2    ketotifen fumarate (ZADITOR) 0.035 % ophthalmic solution Apply 1 drop to eye daily 10 mL 5    metroNIDAZOLE (METROGEL VAGINAL) 0.75 % vaginal gel Place vaginally 2 times daily. (Patient not taking: Reported on 2022) 1 each 1    ARIPiprazole (ABILIFY) 5 MG tablet Take 1 tablet by mouth daily 30 tablet 11    estradiol (ESTRACE VAGINAL) 0.1 MG/GM vaginal cream Place 1g vaginally nightly (Patient not taking: Reported on 2025) 1 Tube 0    ibuprofen (ADVIL;MOTRIN) 600 MG tablet Take 1 tablet by mouth every 6 hours as needed for Pain 120 tablet 0    simethicone (MYLICON) 80 MG chewable tablet Take 1 tablet by mouth every 6 hours as needed for Flatulence (Patient not taking: Reported on 2022) 180 tablet 0    docusate sodium (COLACE) 100 MG capsule Take 1 capsule by mouth 2 times daily as needed for Constipation (Patient not taking: Reported on 2025) 60 capsule 1    buPROPion (WELLBUTRIN XL)

## 2025-04-30 NOTE — PROGRESS NOTES
Jacob Correia (:  1980) is a 45 y.o. female,{New vs Established:[id number]}, here for evaluation of the following chief complaint(s):  Vaginal Bleeding (X yesterday and had a hysterectomy and now is spotting after intercourse )      Assessment & Plan :  Visit Diagnoses and Associated Orders       Vaginal bleeding    -  Primary           Vaginal discharge        metroNIDAZOLE (FLAGYL) 500 MG tablet [5016]      Urinary Tract Infection + High Risk Sexual Behavior ([facility]) [MMA60005 Custom]             Elevated blood pressure reading                   • Follow up in *** days if symptoms persist or if symptoms worsen.       Subjective :  HPI     45 y.o. female presents with symptoms of ***         Vitals:    25 1450 25 1523   BP: (!) 156/92 138/87   BP Site: Left Upper Arm Left Upper Arm   Patient Position: Sitting Sitting   BP Cuff Size: Medium Adult Medium Adult   Pulse: 81    Resp: 18    Temp: 97.9 °F (36.6 °C)    TempSrc: Oral    SpO2: 98%    Weight: 104.3 kg (230 lb)    Height: 1.626 m (5' 4\")        No results found for this visit on 25.      Objective   Physical Exam          An electronic signature was used to authenticate this note.    ALEXEY Rodas - CNP

## 2025-08-10 ENCOUNTER — HOSPITAL ENCOUNTER (EMERGENCY)
Facility: CLINIC | Age: 45
Discharge: HOME | End: 2025-08-10
Attending: EMERGENCY MEDICINE
Payer: MEDICAID

## 2025-08-10 VITALS
RESPIRATION RATE: 16 BRPM | OXYGEN SATURATION: 97 % | DIASTOLIC BLOOD PRESSURE: 106 MMHG | SYSTOLIC BLOOD PRESSURE: 143 MMHG | HEART RATE: 84 BPM | TEMPERATURE: 98.4 F

## 2025-08-10 DIAGNOSIS — R09.81 NASAL CONGESTION: Primary | ICD-10-CM

## 2025-08-10 LAB
SARS-COV-2 RDRP RESP QL NAA+PROBE: NOT DETECTED
SPECIMEN DESCRIPTION: NORMAL

## 2025-08-10 PROCEDURE — 6370000000 HC RX 637 (ALT 250 FOR IP): Performed by: EMERGENCY MEDICINE

## 2025-08-10 PROCEDURE — 87635 SARS-COV-2 COVID-19 AMP PRB: CPT

## 2025-08-10 PROCEDURE — 99283 EMERGENCY DEPT VISIT LOW MDM: CPT

## 2025-08-10 PROCEDURE — 2500000003 HC RX 250 WO HCPCS: Performed by: EMERGENCY MEDICINE

## 2025-08-10 RX ORDER — PSEUDOEPHEDRINE HCL 30 MG/1
30 TABLET, FILM COATED ORAL ONCE
Status: COMPLETED | OUTPATIENT
Start: 2025-08-10 | End: 2025-08-10

## 2025-08-10 RX ADMIN — PSEUDOEPHEDRINE HCL 30 MG: 30 TABLET, FILM COATED ORAL at 21:54

## 2025-08-10 RX ADMIN — PHENYLEPHRINE HYDROCHLORIDE 2 SPRAY: 0.5 SPRAY NASAL at 21:55

## 2025-08-28 ENCOUNTER — HOSPITAL ENCOUNTER (EMERGENCY)
Facility: CLINIC | Age: 45
Discharge: HOME | End: 2025-08-29
Attending: STUDENT IN AN ORGANIZED HEALTH CARE EDUCATION/TRAINING PROGRAM
Payer: MEDICAID

## 2025-08-28 DIAGNOSIS — S30.860A INSECT BITE OF BUTTOCK WITH LOCAL REACTION, INITIAL ENCOUNTER: Primary | ICD-10-CM
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
## 2025-08-28 PROCEDURE — 99283 EMERGENCY DEPT VISIT LOW MDM: CPT

## 2025-08-28 ASSESSMENT — PAIN - FUNCTIONAL ASSESSMENT: PAIN_FUNCTIONAL_ASSESSMENT: 0-10

## 2025-08-28 ASSESSMENT — PAIN DESCRIPTION - DESCRIPTORS: DESCRIPTORS: BURNING;ACHING

## 2025-08-28 ASSESSMENT — PAIN DESCRIPTION - ORIENTATION: ORIENTATION: RIGHT

## 2025-08-28 ASSESSMENT — PAIN DESCRIPTION - LOCATION: LOCATION: BUTTOCKS

## 2025-08-28 ASSESSMENT — PAIN SCALES - GENERAL: PAINLEVEL_OUTOF10: 0

## 2025-08-29 VITALS
HEART RATE: 85 BPM | WEIGHT: 223 LBS | TEMPERATURE: 98 F | RESPIRATION RATE: 18 BRPM | HEIGHT: 64 IN | DIASTOLIC BLOOD PRESSURE: 97 MMHG | BODY MASS INDEX: 38.07 KG/M2 | SYSTOLIC BLOOD PRESSURE: 158 MMHG | OXYGEN SATURATION: 96 %

## 2025-08-29 PROCEDURE — 6360000002 HC RX W HCPCS: Performed by: STUDENT IN AN ORGANIZED HEALTH CARE EDUCATION/TRAINING PROGRAM

## 2025-08-29 RX ORDER — DEXAMETHASONE SODIUM PHOSPHATE 10 MG/ML
10 INJECTION, SOLUTION INTRAMUSCULAR; INTRAVENOUS ONCE
Status: COMPLETED | OUTPATIENT
Start: 2025-08-29 | End: 2025-08-29

## 2025-08-29 RX ADMIN — DEXAMETHASONE SODIUM PHOSPHATE 10 MG: 10 INJECTION, SOLUTION INTRAMUSCULAR; INTRAVENOUS at 00:14

## (undated) DEVICE — PACK LAP BASIC

## (undated) DEVICE — SUTURE SZ 0 27IN 5/8 CIR UR-6  TAPER PT VIOLET ABSRB VICRYL J603H

## (undated) DEVICE — INSUFFLATION NEEDLE TO ESTABLISH PNEUMOPERITONEUM.: Brand: INSUFFLATION NEEDLE

## (undated) DEVICE — COVER OR TBL W40XL90IN ABSRB STD AND GRIPPY BK SAHARA

## (undated) DEVICE — PAD,NON-ADHERENT,3X8,STERILE,LF,1/PK: Brand: MEDLINE

## (undated) DEVICE — CYSTO/BLADDER IRRIGATION SET, REGULATING CLAMP

## (undated) DEVICE — PROTECTOR ULN NRV PUR FOAM HK LOOP STRP ANATOMICALLY

## (undated) DEVICE — SYRINGE,EAR/ULCER, 2 OZ, STERILE: Brand: MEDLINE

## (undated) DEVICE — GLOVE ORANGE PI 7 1/2   MSG9075

## (undated) DEVICE — TROCAR: Brand: KII FIOS FIRST ENTRY

## (undated) DEVICE — COVER LT HNDL BLU PLAS

## (undated) DEVICE — BINDER ABD 3XL H9XL71 82IN E UNISX 3 PNL

## (undated) DEVICE — SOLUTION SCRB 4OZ 2% CHG FOR SURG SCRBBING HND WSH

## (undated) DEVICE — DEVICE SUT W/ SZ 0 L48IN VLT POLYSRB SUT DISP ES-9 ENDO

## (undated) DEVICE — GLOVE ORANGE PI 7   MSG9070

## (undated) DEVICE — ADHESIVE SKIN CLSR 0.7ML TOP DERMBND ADV

## (undated) DEVICE — APPLICATOR MEDICATED 26 CC SOLUTION HI LT ORNG CHLORAPREP

## (undated) DEVICE — ELECTRO LUBE IS A SINGLE PATIENT USE DEVICE THAT IS INTENDED TO BE USED ON ELECTROSURGICAL ELECTRODES TO REDUCE STICKING.: Brand: KEY SURGICAL ELECTRO LUBE

## (undated) DEVICE — DRAPE,REIN 53X77,STERILE: Brand: MEDLINE

## (undated) DEVICE — TRI-LUMEN FILTERED TUBE SET WITH ACTIVATED CHARCOAL FILTER: Brand: AIRSEAL

## (undated) DEVICE — SYRINGE MED 10ML LUERLOCK TIP W/O SFTY DISP

## (undated) DEVICE — DRAPE,UNDRBUT,WHT GRAD PCH,CAPPORT,20/CS: Brand: MEDLINE

## (undated) DEVICE — ELECTRODE LAPAROSCOPIC LHOOK

## (undated) DEVICE — DRESSING FOAM W4XL10IN AG SIL ADH ANTIMIC POSTOP OPTIFOAM

## (undated) DEVICE — SUTURE VCRL SZ 0 L27IN ABSRB UD L26MM CT-2 1/2 CIR J270H

## (undated) DEVICE — AGENT HEMSTAT 3GM OXIDIZED REGENERATED CELOS ABSRB FOR CONT (ORDER MULTIPLES OF 5EA)

## (undated) DEVICE — SPONGE LAP W18XL18IN WHT COT 4 PLY FLD STRUNG RADPQ DISP ST

## (undated) DEVICE — AIRSEAL 5 MM ACCESS PORT AND LOW PROFILE OBTURATOR WITH BLADELESS OPTICAL TIP, 120 MM LENGTH: Brand: AIRSEAL

## (undated) DEVICE — GOWN,SIRUS,NONRNF,SETINSLV,XL,20/CS: Brand: MEDLINE

## (undated) DEVICE — 1016 S-DRAPE IRRIG POUCH 10/BOX: Brand: STERI-DRAPE™

## (undated) DEVICE — INTENDED FOR TISSUE SEPARATION, AND OTHER PROCEDURES THAT REQUIRE A SHARP SURGICAL BLADE TO PUNCTURE OR CUT.: Brand: BARD-PARKER ® CARBON RIB-BACK BLADES

## (undated) DEVICE — SUTURE COAT VCRL SZ 0 L18IN ABSRB UD W/O NDL POLYGLACTIN J112T

## (undated) DEVICE — SUTURE VCRL + SZ 0 L27IN ABSRB VLT L26MM UR-6 5/8 CIR VCP603H

## (undated) DEVICE — STRIP SKIN CLSR W0.25XL4IN WHT SPUNBOUND FBR NYL HI ADH

## (undated) DEVICE — SUTURE MCRYL SZ 4-0 L18IN ABSRB UD L16MM PC-3 3/8 CIR PRIM Y845G

## (undated) DEVICE — Z DISCONTINUED BY MEDLINE USE 2711682 TRAY SKIN PREP DRY W/ PREM GLV

## (undated) DEVICE — METER,URINE,400ML,DRAIN BAG,L/F,LL: Brand: MEDLINE

## (undated) DEVICE — CONTAINER,SPECIMEN,4OZ,OR STRL: Brand: MEDLINE

## (undated) DEVICE — PLUMEPORT SEO LAPAROSCOPIC SMOKE FILTRATION DEVICE: Brand: PLUMEPORT

## (undated) DEVICE — DEVICE SUT SHFT L34CM DIA 10MM 2 JAW LD UNIT ENDOSTCH

## (undated) DEVICE — SUTURE VCRL SZ 0 L36IN ABSRB UD L36MM CT-1 1/2 CIR J946H

## (undated) DEVICE — SOLUTION ANTIFOG VIS SYS CLEARIFY LAPSCP

## (undated) DEVICE — LEGGINGS, PAIR, 31X48, STERILE: Brand: MEDLINE

## (undated) DEVICE — Device: Brand: UTERINE ELEVATOR PRO

## (undated) DEVICE — TOWEL,OR,DSP,ST,NATURAL,DLX,4/PK,20PK/CS: Brand: MEDLINE

## (undated) DEVICE — GLOVE SURG SZ 6 THK91MIL LTX FREE SYN POLYISOPRENE ANTI

## (undated) DEVICE — 40580 - THE PINK PAD - ADVANCED TRENDELENBURG POSITIONING KIT: Brand: 40580 - THE PINK PAD - ADVANCED TRENDELENBURG POSITIONING KIT

## (undated) DEVICE — GOWN,AURORA,NONREINFORCED,LARGE: Brand: MEDLINE

## (undated) DEVICE — GAUZE,SPONGE,4"X4",16PLY,XRAY,STRL,LF: Brand: MEDLINE

## (undated) DEVICE — TOTAL TRAY, 16FR 10ML SIL FOLEY, URN: Brand: MEDLINE

## (undated) DEVICE — SCISSOR SURG METZ CRV TIP

## (undated) DEVICE — FORCEPS ES L33CM DIA5MM CUT ERGO CUT BLDE TRIG HND ACT

## (undated) DEVICE — GLOVE SURG SZ 65 THK91MIL LTX FREE SYN POLYISOPRENE